# Patient Record
Sex: MALE | Race: WHITE | Employment: OTHER | URBAN - METROPOLITAN AREA
[De-identification: names, ages, dates, MRNs, and addresses within clinical notes are randomized per-mention and may not be internally consistent; named-entity substitution may affect disease eponyms.]

---

## 2017-04-05 PROBLEM — I48.0 PAF (PAROXYSMAL ATRIAL FIBRILLATION) (HCC): Status: ACTIVE | Noted: 2017-04-05

## 2017-04-05 PROBLEM — R94.39 ABNORMAL STRESS TEST: Status: ACTIVE | Noted: 2017-04-05

## 2019-06-02 ENCOUNTER — HOSPITAL ENCOUNTER (EMERGENCY)
Age: 64
Discharge: HOME | End: 2019-06-03
Attending: EMERGENCY MEDICINE
Payer: COMMERCIAL

## 2019-06-02 ENCOUNTER — APPOINTMENT (OUTPATIENT)
Dept: CT IMAGING | Age: 64
End: 2019-06-02
Payer: COMMERCIAL

## 2019-06-02 DIAGNOSIS — N20.0 KIDNEY STONE: ICD-10-CM

## 2019-06-02 DIAGNOSIS — M47.816 OSTEOARTHRITIS OF LUMBAR SPINE, UNSPECIFIED SPINAL OSTEOARTHRITIS COMPLICATION STATUS: ICD-10-CM

## 2019-06-02 DIAGNOSIS — S39.012A STRAIN OF LUMBAR REGION, INITIAL ENCOUNTER: Primary | ICD-10-CM

## 2019-06-02 LAB
ALBUMIN SERPL-MCNC: 4.5 G/DL (ref 3.5–5.2)
ALP BLD-CCNC: 88 U/L (ref 40–129)
ALT SERPL-CCNC: 25 U/L (ref 0–40)
ANION GAP SERPL CALCULATED.3IONS-SCNC: 17 MMOL/L (ref 7–16)
AST SERPL-CCNC: 23 U/L (ref 0–39)
BASOPHILS ABSOLUTE: 0.03 E9/L (ref 0–0.2)
BASOPHILS RELATIVE PERCENT: 0.4 % (ref 0–2)
BILIRUB SERPL-MCNC: 0.5 MG/DL (ref 0–1.2)
BILIRUBIN URINE: NEGATIVE
BLOOD, URINE: NEGATIVE
BUN BLDV-MCNC: 18 MG/DL (ref 8–23)
CALCIUM SERPL-MCNC: 10.1 MG/DL (ref 8.6–10.2)
CHLORIDE BLD-SCNC: 102 MMOL/L (ref 98–107)
CLARITY: CLEAR
CO2: 18 MMOL/L (ref 22–29)
COLOR: YELLOW
CREAT SERPL-MCNC: 0.9 MG/DL (ref 0.7–1.2)
EOSINOPHILS ABSOLUTE: 0 E9/L (ref 0.05–0.5)
EOSINOPHILS RELATIVE PERCENT: 0 % (ref 0–6)
GFR AFRICAN AMERICAN: >60
GFR NON-AFRICAN AMERICAN: >60 ML/MIN/1.73
GLUCOSE BLD-MCNC: 180 MG/DL (ref 74–99)
GLUCOSE URINE: NEGATIVE MG/DL
HCT VFR BLD CALC: 50.9 % (ref 37–54)
HEMOGLOBIN: 17.4 G/DL (ref 12.5–16.5)
IMMATURE GRANULOCYTES #: 0.02 E9/L
IMMATURE GRANULOCYTES %: 0.3 % (ref 0–5)
KETONES, URINE: NEGATIVE MG/DL
LEUKOCYTE ESTERASE, URINE: NEGATIVE
LIPASE: 19 U/L (ref 13–60)
LYMPHOCYTES ABSOLUTE: 0.66 E9/L (ref 1.5–4)
LYMPHOCYTES RELATIVE PERCENT: 8.8 % (ref 20–42)
MCH RBC QN AUTO: 29.4 PG (ref 26–35)
MCHC RBC AUTO-ENTMCNC: 34.2 % (ref 32–34.5)
MCV RBC AUTO: 86.1 FL (ref 80–99.9)
MONOCYTES ABSOLUTE: 0.12 E9/L (ref 0.1–0.95)
MONOCYTES RELATIVE PERCENT: 1.6 % (ref 2–12)
NEUTROPHILS ABSOLUTE: 6.64 E9/L (ref 1.8–7.3)
NEUTROPHILS RELATIVE PERCENT: 88.9 % (ref 43–80)
NITRITE, URINE: NEGATIVE
PDW BLD-RTO: 12.1 FL (ref 11.5–15)
PH UA: 5.5 (ref 5–9)
PLATELET # BLD: 280 E9/L (ref 130–450)
PMV BLD AUTO: 10.8 FL (ref 7–12)
POTASSIUM REFLEX MAGNESIUM: 4.1 MMOL/L (ref 3.5–5)
PROTEIN UA: NEGATIVE MG/DL
RBC # BLD: 5.91 E12/L (ref 3.8–5.8)
SODIUM BLD-SCNC: 137 MMOL/L (ref 132–146)
SPECIFIC GRAVITY UA: <=1.005 (ref 1–1.03)
TOTAL PROTEIN: 7.2 G/DL (ref 6.4–8.3)
UROBILINOGEN, URINE: 0.2 E.U./DL
WBC # BLD: 7.5 E9/L (ref 4.5–11.5)

## 2019-06-02 PROCEDURE — 96375 TX/PRO/DX INJ NEW DRUG ADDON: CPT

## 2019-06-02 PROCEDURE — 85025 COMPLETE CBC W/AUTO DIFF WBC: CPT

## 2019-06-02 PROCEDURE — 74177 CT ABD & PELVIS W/CONTRAST: CPT

## 2019-06-02 PROCEDURE — 80053 COMPREHEN METABOLIC PANEL: CPT

## 2019-06-02 PROCEDURE — 2580000003 HC RX 258: Performed by: RADIOLOGY

## 2019-06-02 PROCEDURE — 2580000003 HC RX 258: Performed by: EMERGENCY MEDICINE

## 2019-06-02 PROCEDURE — 83690 ASSAY OF LIPASE: CPT

## 2019-06-02 PROCEDURE — 99284 EMERGENCY DEPT VISIT MOD MDM: CPT

## 2019-06-02 PROCEDURE — 72131 CT LUMBAR SPINE W/O DYE: CPT

## 2019-06-02 PROCEDURE — 96374 THER/PROPH/DIAG INJ IV PUSH: CPT

## 2019-06-02 PROCEDURE — 6360000002 HC RX W HCPCS: Performed by: EMERGENCY MEDICINE

## 2019-06-02 PROCEDURE — 6360000004 HC RX CONTRAST MEDICATION: Performed by: RADIOLOGY

## 2019-06-02 PROCEDURE — 81003 URINALYSIS AUTO W/O SCOPE: CPT

## 2019-06-02 RX ORDER — SODIUM CHLORIDE 0.9 % (FLUSH) 0.9 %
10 SYRINGE (ML) INJECTION 2 TIMES DAILY
Status: DISCONTINUED | OUTPATIENT
Start: 2019-06-02 | End: 2019-06-03 | Stop reason: HOSPADM

## 2019-06-02 RX ORDER — ONDANSETRON 2 MG/ML
4 INJECTION INTRAMUSCULAR; INTRAVENOUS ONCE
Status: COMPLETED | OUTPATIENT
Start: 2019-06-02 | End: 2019-06-02

## 2019-06-02 RX ORDER — 0.9 % SODIUM CHLORIDE 0.9 %
1000 INTRAVENOUS SOLUTION INTRAVENOUS ONCE
Status: COMPLETED | OUTPATIENT
Start: 2019-06-02 | End: 2019-06-02

## 2019-06-02 RX ORDER — DEXAMETHASONE SODIUM PHOSPHATE 10 MG/ML
10 INJECTION INTRAMUSCULAR; INTRAVENOUS ONCE
Status: COMPLETED | OUTPATIENT
Start: 2019-06-02 | End: 2019-06-02

## 2019-06-02 RX ORDER — KETOROLAC TROMETHAMINE 30 MG/ML
30 INJECTION, SOLUTION INTRAMUSCULAR; INTRAVENOUS ONCE
Status: COMPLETED | OUTPATIENT
Start: 2019-06-02 | End: 2019-06-02

## 2019-06-02 RX ORDER — MAGNESIUM GLUCONATE 27 MG(500)
500 TABLET ORAL DAILY
COMMUNITY

## 2019-06-02 RX ORDER — ASPIRIN 81 MG/1
81 TABLET ORAL DAILY
COMMUNITY

## 2019-06-02 RX ADMIN — KETOROLAC TROMETHAMINE 30 MG: 30 INJECTION, SOLUTION INTRAMUSCULAR; INTRAVENOUS at 22:15

## 2019-06-02 RX ADMIN — HYDROMORPHONE HYDROCHLORIDE 0.5 MG: 1 INJECTION, SOLUTION INTRAMUSCULAR; INTRAVENOUS; SUBCUTANEOUS at 22:15

## 2019-06-02 RX ADMIN — IOPAMIDOL 110 ML: 755 INJECTION, SOLUTION INTRAVENOUS at 23:45

## 2019-06-02 RX ADMIN — SODIUM CHLORIDE 1000 ML: 9 INJECTION, SOLUTION INTRAVENOUS at 22:16

## 2019-06-02 RX ADMIN — DEXAMETHASONE SODIUM PHOSPHATE 10 MG: 10 INJECTION INTRAMUSCULAR; INTRAVENOUS at 22:15

## 2019-06-02 RX ADMIN — Medication 10 ML: at 23:40

## 2019-06-02 RX ADMIN — ONDANSETRON 4 MG: 2 INJECTION INTRAMUSCULAR; INTRAVENOUS at 22:15

## 2019-06-02 ASSESSMENT — ENCOUNTER SYMPTOMS
SORE THROAT: 0
ABDOMINAL PAIN: 1
COUGH: 0
BACK PAIN: 1
SHORTNESS OF BREATH: 0
EYES NEGATIVE: 1

## 2019-06-02 ASSESSMENT — PAIN SCALES - GENERAL
PAINLEVEL_OUTOF10: 8
PAINLEVEL_OUTOF10: 10

## 2019-06-02 ASSESSMENT — PAIN DESCRIPTION - PAIN TYPE: TYPE: ACUTE PAIN

## 2019-06-02 ASSESSMENT — PAIN DESCRIPTION - LOCATION: LOCATION: BACK

## 2019-06-03 VITALS
SYSTOLIC BLOOD PRESSURE: 149 MMHG | HEIGHT: 65 IN | HEART RATE: 80 BPM | RESPIRATION RATE: 18 BRPM | WEIGHT: 185 LBS | OXYGEN SATURATION: 96 % | BODY MASS INDEX: 30.82 KG/M2 | TEMPERATURE: 97 F | DIASTOLIC BLOOD PRESSURE: 82 MMHG

## 2019-06-03 PROCEDURE — 6370000000 HC RX 637 (ALT 250 FOR IP): Performed by: EMERGENCY MEDICINE

## 2019-06-03 RX ORDER — PREDNISONE 50 MG/1
50 TABLET ORAL DAILY
Qty: 5 TABLET | Refills: 0 | Status: SHIPPED | OUTPATIENT
Start: 2019-06-03 | End: 2019-06-08

## 2019-06-03 RX ORDER — OXYCODONE HYDROCHLORIDE AND ACETAMINOPHEN 5; 325 MG/1; MG/1
2 TABLET ORAL ONCE
Status: COMPLETED | OUTPATIENT
Start: 2019-06-03 | End: 2019-06-03

## 2019-06-03 RX ORDER — IBUPROFEN 600 MG/1
600 TABLET ORAL EVERY 8 HOURS PRN
Qty: 30 TABLET | Refills: 1 | Status: SHIPPED | OUTPATIENT
Start: 2019-06-03

## 2019-06-03 RX ORDER — OXYCODONE HYDROCHLORIDE AND ACETAMINOPHEN 5; 325 MG/1; MG/1
1 TABLET ORAL EVERY 6 HOURS PRN
Qty: 12 TABLET | Refills: 0 | Status: SHIPPED | OUTPATIENT
Start: 2019-06-03 | End: 2019-06-06

## 2019-06-03 RX ADMIN — OXYCODONE HYDROCHLORIDE AND ACETAMINOPHEN 2 TABLET: 5; 325 TABLET ORAL at 01:33

## 2019-06-03 ASSESSMENT — PAIN SCALES - GENERAL: PAINLEVEL_OUTOF10: 5

## 2019-06-03 NOTE — ED PROVIDER NOTES
Patient is a 58year old male with hx of chronic back pain presenting to the ED for worsening low back pain over the last 3 days. Pain radiates down his right leg. Pt notes that he was cutting the grass on a tractor before onset of pain. Pt also complains of lower abdominal pain. Pt denies any fall, injury, fever, chills, headache, weakness, numbness, chest pain, sob, nausea, emesis, diarrhea, or bowel/bladder dysfunction. The history is provided by the patient, a parent and a relative. No  was used. Back Pain   Pain location: lower. Radiates to: R leg. Pain severity:  Moderate  Onset quality:  Gradual  Duration:  3 days  Timing:  Constant  Progression:  Worsening  Chronicity:  Recurrent  Associated symptoms: abdominal pain    Associated symptoms: no chest pain, no dysuria, no fever, no headaches, no numbness and no weakness        Review of Systems   Constitutional: Negative for chills and fever. HENT: Negative for congestion and sore throat. Eyes: Negative. Respiratory: Negative for cough and shortness of breath. Cardiovascular: Negative for chest pain and leg swelling. Gastrointestinal: Positive for abdominal pain. Genitourinary: Negative for difficulty urinating, dysuria, flank pain, frequency, hematuria and urgency. Musculoskeletal: Positive for back pain. Right leg pain   Neurological: Negative for dizziness, syncope, weakness, light-headedness, numbness and headaches. All other systems reviewed and are negative. Physical Exam   Constitutional: He appears well-developed and well-nourished. He appears distressed (mild to moderate). HENT:   Head: Normocephalic and atraumatic. Eyes: Pupils are equal, round, and reactive to light. Conjunctivae and EOM are normal.   Neck: Normal range of motion. Cardiovascular: Normal rate, regular rhythm, normal heart sounds and intact distal pulses.    Pulmonary/Chest: Effort normal and breath sounds normal. history of Abnormal stress test, Cancer ([facility] [address] Chronic back pain, Hyperlipidemia, Hypertension, and PAF (paroxysmal atrial fibrillation) ([facility] [address] Past Surgical History:  has a past surgical history that includes Small intestine surgery; lymph node biopsy; Spine surgery; Colonoscopy; and Rotator cuff repair. Social History:  reports that he has been smoking cigars. He started smoking about 39 years ago. He has quit using smokeless tobacco. He reports that he drinks alcohol. He reports that he does not use drugs. Family History: family history includes Heart Disease in his sister; Stroke in his father. The patients home medications have been reviewed. Allergies: Patient has no known allergies.     -------------------------------------------------- RESULTS -------------------------------------------------  Labs:  Results for orders placed or performed during the hospital encounter of 06/02/19   CBC Auto Differential   Result Value Ref Range    WBC 7.5 4.5 - 11.5 E9/L    RBC 5.91 (H) 3.80 - 5.80 E12/L    Hemoglobin 17.4 (H) 12.5 - 16.5 g/dL    Hematocrit 50.9 37.0 - 54.0 %    MCV 86.1 80.0 - 99.9 fL    MCH 29.4 26.0 - 35.0 pg    MCHC 34.2 32.0 - 34.5 %    RDW 12.1 11.5 - 15.0 fL    Platelets 372 824 - 495 E9/L    MPV 10.8 7.0 - 12.0 fL    Neutrophils % 88.9 (H) 43.0 - 80.0 %    Immature Granulocytes % 0.3 0.0 - 5.0 %    Lymphocytes % 8.8 (L) 20.0 - 42.0 %    Monocytes % 1.6 (L) 2.0 - 12.0 %    Eosinophils % 0.0 0.0 - 6.0 %    Basophils % 0.4 0.0 - 2.0 %    Neutrophils # 6.64 1.80 - 7.30 E9/L    Immature Granulocytes # 0.02 E9/L    Lymphocytes # 0.66 (L) 1.50 - 4.00 E9/L    Monocytes # 0.12 0.10 - 0.95 E9/L    Eosinophils # 0.00 (L) 0.05 - 0.50 E9/L    Basophils # 0.03 0.00 - 0.20 E9/L   Comprehensive Metabolic Panel w/ Reflex to MG   Result Value Ref Range    Sodium 137 132 - 146 mmol/L    Potassium reflex Magnesium 4.1 3.5 - 5.0 mmol/L    Chloride 102 98 - 107 mmol/L    CO2 18 (L) 22 - 29 mmol/L    Anion Gap 17 (H) 7 - 16 mmol/L    Glucose 180 (H) 74 - 99 mg/dL    BUN 18 8 - 23 mg/dL    CREATININE 0.9 0.7 - 1.2 mg/dL    GFR Non-African American >60 >=60 mL/min/1.73    GFR African American >60     Calcium 10.1 8.6 - 10.2 mg/dL    Total Protein 7.2 6.4 - 8.3 g/dL    Alb 4.5 3.5 - 5.2 g/dL    Total Bilirubin 0.5 0.0 - 1.2 mg/dL    Alkaline Phosphatase 88 40 - 129 U/L    ALT 25 0 - 40 U/L    AST 23 0 - 39 U/L   Lipase   Result Value Ref Range    Lipase 19 13 - 60 U/L   Urinalysis, reflex to microscopic   Result Value Ref Range    Color, UA Yellow Straw/Yellow    Clarity, UA Clear Clear    Glucose, Ur Negative Negative mg/dL    Bilirubin Urine Negative Negative    Ketones, Urine Negative Negative mg/dL    Specific Gravity, UA <=1.005 1.005 - 1.030    Blood, Urine Negative Negative    pH, UA 5.5 5.0 - 9.0    Protein, UA Negative Negative mg/dL    Urobilinogen, Urine 0.2 <2.0 E.U./dL    Nitrite, Urine Negative Negative    Leukocyte Esterase, Urine Negative Negative       Radiology:  CT ABDOMEN PELVIS W IV CONTRAST Additional Contrast? None   Final Result   1. Nonobstructing calcifications in the renal pelvis bilaterally. Large left    calcified stone present although no obstruction present. Calcification in the    left renal pelvis measures approximately one centimeters. Right renal cyst   2. Thickening of the wall of the urinary bladder, could be partially due to    incomplete distention although underlying inflammatory changes are in the    differential consideration. 3. Diverticulosis of the colon present. This report has been electronically signed by Juan Matos MD.      CT Lumbar Spine WO Contrast   Final Result   Degenerative spondylosis as above if clinically warranted consider    followup MRI to evaluate the soft tissues in the differential.      Hypodensity in the right kidney may represent cysts, incompletely characterized.       This report has been electronically signed by Juan Matos MD. ------------------------- NURSING NOTES AND VITALS REVIEWED ---------------------------  Date / Time Roomed:  6/2/2019  9:27 PM  ED Bed Assignment:  04/04    The nursing notes within the ED encounter and vital signs as below have been reviewed. BP (!) 182/84   Pulse 82   Temp 97 °F (36.1 °C) (Axillary)   Resp 20   Ht 5' 5\" (1.651 m)   Wt 185 lb (83.9 kg)   SpO2 94%   BMI 30.79 kg/m²   Oxygen Saturation Interpretation: Normal      ------------------------------------------ PROGRESS NOTES ------------------------------------------  I have spoken with the patient and discussed todays results, in addition to providing specific details for the plan of care and counseling regarding the diagnosis and prognosis. Their questions are answered at this time and they are agreeable with the plan. I discussed at length with them reasons for immediate return here for re evaluation. They will followup with primary care by calling their office tomorrow. --------------------------------- ADDITIONAL PROVIDER NOTES ---------------------------------  At this time the patient is without objective evidence of an acute process requiring hospitalization or inpatient management. They have remained hemodynamically stable throughout their entire ED visit and are stable for discharge with outpatient follow-up. The plan has been discussed in detail and they are aware of the specific conditions for emergent return, as well as the importance of follow-up. New Prescriptions    IBUPROFEN (IBU) 600 MG TABLET    Take 1 tablet by mouth every 8 hours as needed for Pain Take with food. OXYCODONE-ACETAMINOPHEN (PERCOCET) 5-325 MG PER TABLET    Take 1 tablet by mouth every 6 hours as needed for Pain for up to 3 days. Intended supply: 3 days. Take lowest dose possible to manage pain    PREDNISONE (DELTASONE) 50 MG TABLET    Take 1 tablet by mouth daily for 5 days       Diagnosis:  1.  Strain of lumbar region, initial encounter New Problem   2. Osteoarthritis of lumbar spine, unspecified spinal osteoarthritis complication status New Problem   3. Kidney stone New Problem       Disposition:  Patient's disposition: Discharge to home  Patient's condition is stable. 6/2/19, 9:49 PM.    This note is prepared by Randall Turner, acting as Scribe for The DO Milli. The DO Milli:  The scribe's documentation has been prepared under my direction and personally reviewed by me in its entirety. I confirm that the note above accurately reflects all work, treatment, procedures, and medical decision making performed by me.         The Milli [place]  06/03/19 1415

## 2020-02-21 ENCOUNTER — HOSPITAL ENCOUNTER (OUTPATIENT)
Age: 65
Setting detail: OBSERVATION
Discharge: HOME | End: 2020-02-22
Attending: EMERGENCY MEDICINE | Admitting: INTERNAL MEDICINE
Payer: MEDICARE

## 2020-02-21 PROCEDURE — 99285 EMERGENCY DEPT VISIT HI MDM: CPT

## 2020-02-21 PROCEDURE — 93005 ELECTROCARDIOGRAM TRACING: CPT | Performed by: EMERGENCY MEDICINE

## 2020-02-22 ENCOUNTER — APPOINTMENT (OUTPATIENT)
Dept: GENERAL RADIOLOGY | Age: 65
End: 2020-02-22
Payer: MEDICARE

## 2020-02-22 ENCOUNTER — APPOINTMENT (OUTPATIENT)
Dept: NUCLEAR MEDICINE | Age: 65
End: 2020-02-22
Payer: MEDICARE

## 2020-02-22 VITALS
BODY MASS INDEX: 30.32 KG/M2 | TEMPERATURE: 97.9 F | SYSTOLIC BLOOD PRESSURE: 142 MMHG | OXYGEN SATURATION: 94 % | HEART RATE: 77 BPM | WEIGHT: 182 LBS | DIASTOLIC BLOOD PRESSURE: 89 MMHG | RESPIRATION RATE: 18 BRPM | HEIGHT: 65 IN

## 2020-02-22 PROBLEM — I48.0 PAROXYSMAL ATRIAL FIBRILLATION (HCC): Chronic | Status: ACTIVE | Noted: 2020-02-22

## 2020-02-22 PROBLEM — E78.5 HYPERLIPIDEMIA LDL GOAL <100: Chronic | Status: ACTIVE | Noted: 2020-02-22

## 2020-02-22 PROBLEM — E78.5 DYSLIPIDEMIA: Status: RESOLVED | Noted: 2020-02-22 | Resolved: 2020-02-22

## 2020-02-22 PROBLEM — I48.0 PAF (PAROXYSMAL ATRIAL FIBRILLATION) (HCC): Status: RESOLVED | Noted: 2017-04-05 | Resolved: 2020-02-22

## 2020-02-22 PROBLEM — E78.5 DYSLIPIDEMIA: Status: ACTIVE | Noted: 2020-02-22

## 2020-02-22 PROBLEM — I10 ESSENTIAL HYPERTENSION: Status: ACTIVE | Noted: 2020-02-22

## 2020-02-22 PROBLEM — R07.9 CHEST PAIN: Status: ACTIVE | Noted: 2020-02-22

## 2020-02-22 PROBLEM — R94.39 ABNORMAL STRESS TEST: Status: RESOLVED | Noted: 2017-04-05 | Resolved: 2020-02-22

## 2020-02-22 LAB
ALBUMIN SERPL-MCNC: 4.3 G/DL (ref 3.5–5.2)
ALP BLD-CCNC: 91 U/L (ref 40–129)
ALT SERPL-CCNC: 34 U/L (ref 0–40)
ANION GAP SERPL CALCULATED.3IONS-SCNC: 11 MMOL/L (ref 7–16)
ANION GAP SERPL CALCULATED.3IONS-SCNC: 13 MMOL/L (ref 7–16)
APTT: 30.9 SEC (ref 24.5–35.1)
AST SERPL-CCNC: 26 U/L (ref 0–39)
BASOPHILS ABSOLUTE: 0.08 E9/L (ref 0–0.2)
BASOPHILS ABSOLUTE: 0.09 E9/L (ref 0–0.2)
BASOPHILS RELATIVE PERCENT: 1.2 % (ref 0–2)
BASOPHILS RELATIVE PERCENT: 1.3 % (ref 0–2)
BILIRUB SERPL-MCNC: 0.6 MG/DL (ref 0–1.2)
BUN BLDV-MCNC: 13 MG/DL (ref 8–23)
BUN BLDV-MCNC: 15 MG/DL (ref 8–23)
CALCIUM SERPL-MCNC: 9.2 MG/DL (ref 8.6–10.2)
CALCIUM SERPL-MCNC: 9.4 MG/DL (ref 8.6–10.2)
CHLORIDE BLD-SCNC: 104 MMOL/L (ref 98–107)
CHLORIDE BLD-SCNC: 99 MMOL/L (ref 98–107)
CHOLESTEROL, TOTAL: 163 MG/DL (ref 0–199)
CO2: 22 MMOL/L (ref 22–29)
CO2: 22 MMOL/L (ref 22–29)
CREAT SERPL-MCNC: 0.7 MG/DL (ref 0.7–1.2)
CREAT SERPL-MCNC: 0.9 MG/DL (ref 0.7–1.2)
EKG ATRIAL RATE: 85 BPM
EKG P AXIS: 29 DEGREES
EKG P-R INTERVAL: 172 MS
EKG Q-T INTERVAL: 400 MS
EKG QRS DURATION: 86 MS
EKG QTC CALCULATION (BAZETT): 476 MS
EKG R AXIS: -11 DEGREES
EKG T AXIS: 37 DEGREES
EKG VENTRICULAR RATE: 85 BPM
EOSINOPHILS ABSOLUTE: 0.27 E9/L (ref 0.05–0.5)
EOSINOPHILS ABSOLUTE: 0.29 E9/L (ref 0.05–0.5)
EOSINOPHILS RELATIVE PERCENT: 3.5 % (ref 0–6)
EOSINOPHILS RELATIVE PERCENT: 4.7 % (ref 0–6)
GFR AFRICAN AMERICAN: >60
GFR AFRICAN AMERICAN: >60
GFR NON-AFRICAN AMERICAN: >60 ML/MIN/1.73
GFR NON-AFRICAN AMERICAN: >60 ML/MIN/1.73
GLUCOSE BLD-MCNC: 103 MG/DL (ref 74–99)
GLUCOSE BLD-MCNC: 110 MG/DL (ref 74–99)
HCT VFR BLD CALC: 44.8 % (ref 37–54)
HCT VFR BLD CALC: 46 % (ref 37–54)
HDLC SERPL-MCNC: 41 MG/DL
HEMOGLOBIN: 15 G/DL (ref 12.5–16.5)
HEMOGLOBIN: 15.6 G/DL (ref 12.5–16.5)
IMMATURE GRANULOCYTES #: 0.01 E9/L
IMMATURE GRANULOCYTES #: 0.02 E9/L
IMMATURE GRANULOCYTES %: 0.2 % (ref 0–5)
IMMATURE GRANULOCYTES %: 0.3 % (ref 0–5)
INR BLD: 0.9
LDL CHOLESTEROL CALCULATED: ABNORMAL MG/DL (ref 0–99)
LV EF: 70 %
LVEF MODALITY: NORMAL
LYMPHOCYTES ABSOLUTE: 1.81 E9/L (ref 1.5–4)
LYMPHOCYTES ABSOLUTE: 1.92 E9/L (ref 1.5–4)
LYMPHOCYTES RELATIVE PERCENT: 24.9 % (ref 20–42)
LYMPHOCYTES RELATIVE PERCENT: 29.4 % (ref 20–42)
MCH RBC QN AUTO: 29.6 PG (ref 26–35)
MCH RBC QN AUTO: 29.6 PG (ref 26–35)
MCHC RBC AUTO-ENTMCNC: 33.5 % (ref 32–34.5)
MCHC RBC AUTO-ENTMCNC: 33.9 % (ref 32–34.5)
MCV RBC AUTO: 87.3 FL (ref 80–99.9)
MCV RBC AUTO: 88.4 FL (ref 80–99.9)
MONOCYTES ABSOLUTE: 0.72 E9/L (ref 0.1–0.95)
MONOCYTES ABSOLUTE: 0.99 E9/L (ref 0.1–0.95)
MONOCYTES RELATIVE PERCENT: 11.7 % (ref 2–12)
MONOCYTES RELATIVE PERCENT: 12.8 % (ref 2–12)
NEUTROPHILS ABSOLUTE: 3.24 E9/L (ref 1.8–7.3)
NEUTROPHILS ABSOLUTE: 4.43 E9/L (ref 1.8–7.3)
NEUTROPHILS RELATIVE PERCENT: 52.7 % (ref 43–80)
NEUTROPHILS RELATIVE PERCENT: 57.3 % (ref 43–80)
PDW BLD-RTO: 12.4 FL (ref 11.5–15)
PDW BLD-RTO: 12.5 FL (ref 11.5–15)
PLATELET # BLD: 222 E9/L (ref 130–450)
PLATELET # BLD: 249 E9/L (ref 130–450)
PMV BLD AUTO: 10.2 FL (ref 7–12)
PMV BLD AUTO: 10.4 FL (ref 7–12)
POTASSIUM REFLEX MAGNESIUM: 3.8 MMOL/L (ref 3.5–5)
POTASSIUM REFLEX MAGNESIUM: 4.3 MMOL/L (ref 3.5–5)
PROTHROMBIN TIME: 11.2 SEC (ref 9.3–12.4)
RBC # BLD: 5.07 E12/L (ref 3.8–5.8)
RBC # BLD: 5.27 E12/L (ref 3.8–5.8)
SODIUM BLD-SCNC: 134 MMOL/L (ref 132–146)
SODIUM BLD-SCNC: 137 MMOL/L (ref 132–146)
TOTAL PROTEIN: 6.8 G/DL (ref 6.4–8.3)
TRIGL SERPL-MCNC: 500 MG/DL (ref 0–149)
TROPONIN: <0.01 NG/ML (ref 0–0.03)
VLDLC SERPL CALC-MCNC: ABNORMAL MG/DL
WBC # BLD: 6.2 E9/L (ref 4.5–11.5)
WBC # BLD: 7.7 E9/L (ref 4.5–11.5)

## 2020-02-22 PROCEDURE — 85610 PROTHROMBIN TIME: CPT

## 2020-02-22 PROCEDURE — 93010 ELECTROCARDIOGRAM REPORT: CPT | Performed by: INTERNAL MEDICINE

## 2020-02-22 PROCEDURE — 6360000002 HC RX W HCPCS: Performed by: INTERNAL MEDICINE

## 2020-02-22 PROCEDURE — 6360000002 HC RX W HCPCS: Performed by: NURSE PRACTITIONER

## 2020-02-22 PROCEDURE — 96372 THER/PROPH/DIAG INJ SC/IM: CPT

## 2020-02-22 PROCEDURE — 93018 CV STRESS TEST I&R ONLY: CPT | Performed by: INTERNAL MEDICINE

## 2020-02-22 PROCEDURE — 85730 THROMBOPLASTIN TIME PARTIAL: CPT

## 2020-02-22 PROCEDURE — G0378 HOSPITAL OBSERVATION PER HR: HCPCS

## 2020-02-22 PROCEDURE — 80061 LIPID PANEL: CPT

## 2020-02-22 PROCEDURE — 80048 BASIC METABOLIC PNL TOTAL CA: CPT

## 2020-02-22 PROCEDURE — 93017 CV STRESS TEST TRACING ONLY: CPT

## 2020-02-22 PROCEDURE — 93016 CV STRESS TEST SUPVJ ONLY: CPT | Performed by: INTERNAL MEDICINE

## 2020-02-22 PROCEDURE — 6370000000 HC RX 637 (ALT 250 FOR IP): Performed by: NURSE PRACTITIONER

## 2020-02-22 PROCEDURE — A9500 TC99M SESTAMIBI: HCPCS | Performed by: RADIOLOGY

## 2020-02-22 PROCEDURE — 6370000000 HC RX 637 (ALT 250 FOR IP): Performed by: STUDENT IN AN ORGANIZED HEALTH CARE EDUCATION/TRAINING PROGRAM

## 2020-02-22 PROCEDURE — 71045 X-RAY EXAM CHEST 1 VIEW: CPT

## 2020-02-22 PROCEDURE — APPSS45 APP SPLIT SHARED TIME 31-45 MINUTES: Performed by: PHYSICIAN ASSISTANT

## 2020-02-22 PROCEDURE — 78452 HT MUSCLE IMAGE SPECT MULT: CPT

## 2020-02-22 PROCEDURE — 93005 ELECTROCARDIOGRAM TRACING: CPT | Performed by: STUDENT IN AN ORGANIZED HEALTH CARE EDUCATION/TRAINING PROGRAM

## 2020-02-22 PROCEDURE — 3430000000 HC RX DIAGNOSTIC RADIOPHARMACEUTICAL: Performed by: RADIOLOGY

## 2020-02-22 PROCEDURE — 84484 ASSAY OF TROPONIN QUANT: CPT

## 2020-02-22 PROCEDURE — 80053 COMPREHEN METABOLIC PANEL: CPT

## 2020-02-22 PROCEDURE — 36415 COLL VENOUS BLD VENIPUNCTURE: CPT

## 2020-02-22 PROCEDURE — 2580000003 HC RX 258: Performed by: NURSE PRACTITIONER

## 2020-02-22 PROCEDURE — 99214 OFFICE O/P EST MOD 30 MIN: CPT | Performed by: INTERNAL MEDICINE

## 2020-02-22 PROCEDURE — 85025 COMPLETE CBC W/AUTO DIFF WBC: CPT

## 2020-02-22 RX ORDER — OMEGA-3-ACID ETHYL ESTERS 1 G/1
2 CAPSULE, LIQUID FILLED ORAL 2 TIMES DAILY
Status: DISCONTINUED | OUTPATIENT
Start: 2020-02-22 | End: 2020-02-22 | Stop reason: HOSPADM

## 2020-02-22 RX ORDER — ASPIRIN 81 MG/1
324 TABLET, CHEWABLE ORAL ONCE
Status: COMPLETED | OUTPATIENT
Start: 2020-02-22 | End: 2020-02-22

## 2020-02-22 RX ORDER — NITROGLYCERIN 0.4 MG/1
0.4 TABLET SUBLINGUAL EVERY 5 MIN PRN
Status: DISCONTINUED | OUTPATIENT
Start: 2020-02-22 | End: 2020-02-22 | Stop reason: HOSPADM

## 2020-02-22 RX ORDER — ICOSAPENT ETHYL 1000 MG/1
2 CAPSULE ORAL 2 TIMES DAILY
Status: DISCONTINUED | OUTPATIENT
Start: 2020-02-22 | End: 2020-02-22 | Stop reason: CLARIF

## 2020-02-22 RX ORDER — ACETAMINOPHEN 650 MG/1
650 SUPPOSITORY RECTAL EVERY 6 HOURS PRN
Status: DISCONTINUED | OUTPATIENT
Start: 2020-02-22 | End: 2020-02-22 | Stop reason: HOSPADM

## 2020-02-22 RX ORDER — M-VIT,TX,IRON,MINS/CALC/FOLIC 27MG-0.4MG
1 TABLET ORAL DAILY
Status: DISCONTINUED | OUTPATIENT
Start: 2020-02-22 | End: 2020-02-22 | Stop reason: HOSPADM

## 2020-02-22 RX ORDER — ACETAMINOPHEN 325 MG/1
650 TABLET ORAL EVERY 6 HOURS PRN
Status: DISCONTINUED | OUTPATIENT
Start: 2020-02-22 | End: 2020-02-22 | Stop reason: HOSPADM

## 2020-02-22 RX ORDER — FENOFIBRATE 160 MG/1
160 TABLET ORAL DAILY
Status: DISCONTINUED | OUTPATIENT
Start: 2020-02-22 | End: 2020-02-22 | Stop reason: HOSPADM

## 2020-02-22 RX ORDER — ASPIRIN 81 MG/1
81 TABLET ORAL DAILY
Status: DISCONTINUED | OUTPATIENT
Start: 2020-02-22 | End: 2020-02-22 | Stop reason: HOSPADM

## 2020-02-22 RX ORDER — ATORVASTATIN CALCIUM 40 MG/1
20 TABLET, FILM COATED ORAL NIGHTLY
Status: DISCONTINUED | OUTPATIENT
Start: 2020-02-22 | End: 2020-02-22 | Stop reason: HOSPADM

## 2020-02-22 RX ORDER — HYDROCHLOROTHIAZIDE 12.5 MG/1
12.5 TABLET ORAL DAILY
Status: DISCONTINUED | OUTPATIENT
Start: 2020-02-22 | End: 2020-02-22 | Stop reason: HOSPADM

## 2020-02-22 RX ORDER — GEMFIBROZIL 600 MG/1
600 TABLET, FILM COATED ORAL 2 TIMES DAILY
Status: DISCONTINUED | OUTPATIENT
Start: 2020-02-22 | End: 2020-02-22 | Stop reason: HOSPADM

## 2020-02-22 RX ORDER — LISINOPRIL AND HYDROCHLOROTHIAZIDE 12.5; 1 MG/1; MG/1
1 TABLET ORAL EVERY OTHER DAY
Status: DISCONTINUED | OUTPATIENT
Start: 2020-02-22 | End: 2020-02-22 | Stop reason: CLARIF

## 2020-02-22 RX ORDER — ONDANSETRON 2 MG/ML
4 INJECTION INTRAMUSCULAR; INTRAVENOUS EVERY 6 HOURS PRN
Status: DISCONTINUED | OUTPATIENT
Start: 2020-02-22 | End: 2020-02-22 | Stop reason: HOSPADM

## 2020-02-22 RX ORDER — SODIUM CHLORIDE 0.9 % (FLUSH) 0.9 %
10 SYRINGE (ML) INJECTION EVERY 12 HOURS SCHEDULED
Status: DISCONTINUED | OUTPATIENT
Start: 2020-02-22 | End: 2020-02-22 | Stop reason: HOSPADM

## 2020-02-22 RX ORDER — MAGNESIUM GLUCONATE 27 MG(500)
500 TABLET ORAL 2 TIMES DAILY
Status: DISCONTINUED | OUTPATIENT
Start: 2020-02-22 | End: 2020-02-22 | Stop reason: HOSPADM

## 2020-02-22 RX ORDER — POLYETHYLENE GLYCOL 3350 17 G/17G
17 POWDER, FOR SOLUTION ORAL DAILY PRN
Status: DISCONTINUED | OUTPATIENT
Start: 2020-02-22 | End: 2020-02-22 | Stop reason: HOSPADM

## 2020-02-22 RX ORDER — PROMETHAZINE HYDROCHLORIDE 25 MG/1
12.5 TABLET ORAL EVERY 6 HOURS PRN
Status: DISCONTINUED | OUTPATIENT
Start: 2020-02-22 | End: 2020-02-22 | Stop reason: HOSPADM

## 2020-02-22 RX ORDER — SODIUM CHLORIDE 0.9 % (FLUSH) 0.9 %
10 SYRINGE (ML) INJECTION PRN
Status: DISCONTINUED | OUTPATIENT
Start: 2020-02-22 | End: 2020-02-22 | Stop reason: HOSPADM

## 2020-02-22 RX ORDER — CHOLECALCIFEROL (VITAMIN D3) 50 MCG
4000 TABLET ORAL DAILY
Status: DISCONTINUED | OUTPATIENT
Start: 2020-02-22 | End: 2020-02-22 | Stop reason: HOSPADM

## 2020-02-22 RX ORDER — LISINOPRIL 10 MG/1
10 TABLET ORAL DAILY
Status: DISCONTINUED | OUTPATIENT
Start: 2020-02-22 | End: 2020-02-22 | Stop reason: HOSPADM

## 2020-02-22 RX ADMIN — ASPIRIN 81 MG: 81 TABLET, COATED ORAL at 09:07

## 2020-02-22 RX ADMIN — OMEGA-3-ACID ETHYL ESTERS 2 G: 1 CAPSULE, LIQUID FILLED ORAL at 09:07

## 2020-02-22 RX ADMIN — OMEGA-3-ACID ETHYL ESTERS 2 G: 1 CAPSULE, LIQUID FILLED ORAL at 03:40

## 2020-02-22 RX ADMIN — NITROGLYCERIN 0.4 MG: 0.4 TABLET, ORALLY DISINTEGRATING SUBLINGUAL at 00:27

## 2020-02-22 RX ADMIN — FENOFIBRATE 160 MG: 160 TABLET ORAL at 09:07

## 2020-02-22 RX ADMIN — Medication 10 ML: at 09:07

## 2020-02-22 RX ADMIN — ATORVASTATIN CALCIUM 20 MG: 40 TABLET, FILM COATED ORAL at 03:39

## 2020-02-22 RX ADMIN — REGADENOSON 0.4 MG: 0.08 INJECTION, SOLUTION INTRAVENOUS at 11:22

## 2020-02-22 RX ADMIN — ENOXAPARIN SODIUM 40 MG: 40 INJECTION SUBCUTANEOUS at 09:06

## 2020-02-22 RX ADMIN — ASPIRIN 81 MG 324 MG: 81 TABLET ORAL at 00:26

## 2020-02-22 RX ADMIN — Medication 500 MG: at 09:07

## 2020-02-22 RX ADMIN — Medication 30 MILLICURIE: at 10:37

## 2020-02-22 RX ADMIN — Medication 1 TABLET: at 09:07

## 2020-02-22 RX ADMIN — Medication 10 MILLICURIE: at 10:37

## 2020-02-22 RX ADMIN — GEMFIBROZIL 600 MG: 600 TABLET ORAL at 09:07

## 2020-02-22 RX ADMIN — GEMFIBROZIL 600 MG: 600 TABLET ORAL at 03:40

## 2020-02-22 RX ADMIN — Medication 500 MG: at 03:40

## 2020-02-22 RX ADMIN — CHOLECALCIFEROL TAB 50 MCG (2000 UNIT) 4000 UNITS: 50 TAB at 09:07

## 2020-02-22 RX ADMIN — NITROGLYCERIN 0.4 MG: 0.4 TABLET, ORALLY DISINTEGRATING SUBLINGUAL at 00:33

## 2020-02-22 ASSESSMENT — ENCOUNTER SYMPTOMS
NAUSEA: 0
CONSTIPATION: 0
VOMITING: 0
COLOR CHANGE: 0
SHORTNESS OF BREATH: 1
BACK PAIN: 0
SORE THROAT: 0
DIARRHEA: 0
ABDOMINAL PAIN: 0
COUGH: 0

## 2020-02-22 ASSESSMENT — PAIN SCALES - GENERAL: PAINLEVEL_OUTOF10: 0

## 2020-02-22 NOTE — PLAN OF CARE
Problem: Safety:  Goal: Free from accidental physical injury  Description  Free from accidental physical injury  Outcome: Met This Shift  Goal: Free from intentional harm  Description  Free from intentional harm  Outcome: Met This Shift     Problem: Daily Care:  Goal: Daily care needs are met  Description  Daily care needs are met  Outcome: Met This Shift     Problem: Discharge Planning:  Goal: Patients continuum of care needs are met  Description  Patients continuum of care needs are met  Outcome: Met This Shift

## 2020-02-22 NOTE — H&P
[address] [facility]  Attending History and Physical      CHIEF COMPLAINT:  Chest pain      HISTORY OF PRESENT ILLNESS:      The patient is a 71 y.o. male patient of dr Kyle Kyle who presents with complains of chest pain. The day of presentation the patient developed pain in the middle of his chest.  He had numbness to his jaw and arm. He did not take anything for the symptoms. He was driving at the time. He denied shortness of breath, abdominal pain, nausea, vomiting, fevers, chills and diaphoresis. He did not take anything for the symptoms. He presented to the ED. He is symptom free at this time. Past Medical History:    Past Medical History:   Diagnosis Date    Abnormal stress test 4/5/2017    Cancer [facility])     Testicular    Chronic back pain     Hyperlipidemia     Hypertension     [facility] 4/5/2017       Past Surgical History:    Past Surgical History:   Procedure Laterality Date    COLONOSCOPY      LYMPH NODE BIOPSY      ROTATOR CUFF REPAIR      SMALL INTESTINE SURGERY      SPINE SURGERY         Medications Prior to Admission:    Medications Prior to Admission: ibuprofen (IBU) 600 MG tablet, Take 1 tablet by mouth every 8 hours as needed for Pain Take with food.   aspirin EC 81 MG EC tablet, Take 81 mg by mouth daily  magnesium gluconate (MAGONATE) 500 MG tablet, Take 500 mg by mouth 2 times daily  psyllium (KONSYL) 28.3 % PACK, Take 1 packet by mouth daily  atorvastatin (LIPITOR) 20 MG tablet,   gemfibrozil (LOPID) 600 MG tablet, TAKE 1 TABLET BY MOUTH TWICE A DAY  VASCEPA 1 G CAPS capsule, TAKE TWO CAPSULES TWO TIMES A DAY  vitamin D (CHOLECALCIFEROL) 1000 UNIT TABS tablet, Take 4,000 Units by mouth daily   Multiple Vitamins-Minerals (THERAPEUTIC MULTIVITAMIN-MINERALS) tablet, Take 1 tablet by mouth daily  lisinopril-hydrochlorothiazide (PRINZIDE;ZESTORETIC) 10-12.5 MG per tablet, Take 1 tablet by mouth every other day fenofibrate (TRICOR) 145 MG tablet, Take 145 mg by mouth daily. Allergies:    Patient has no known allergies. Social History:    reports that he has been smoking cigars. He started smoking about 40 years ago. He has quit using smokeless tobacco. He reports current alcohol use. He reports that he does not use drugs. Family History:   family history includes Heart Disease in his sister; Stroke in his father. REVIEW OF SYSTEMS:  As above in the HPI, otherwise negative    PHYSICAL EXAM:    Vitals:  BP (!) 142/89   Pulse 77   Temp 97.9 °F (36.6 °C) (Oral)   Resp 18   Ht 5' 5\" (1.651 m)   Wt 182 lb (82.6 kg)   SpO2 94%   BMI 30.29 kg/m²     General:  Awake, alert, oriented X 3. Well developed, well nourished, well groomed. No apparent distress. HEENT:  Normocephalic, atraumatic. Pupils equal, round, reactive to light. No scleral icterus. No conjunctival injection. Normal lips, teeth, and gums. No nasal discharge. Neck:  Supple  Heart:  RRR, no murmurs, gallops, rubs  Lungs:  CTA bilaterally, bilat symmetrical expansion, no wheeze, rales, or rhonchi  Abdomen:   Bowel sounds present, soft, nontender, no masses, no organomegaly, no peritoneal signs  Extremities:  No clubbing, cyanosis, or edema  Skin:  Warm and dry, no open lesions or rash  Neuro:  Cranial nerves 2-12 intact, no focal deficits  Breast: deferred  Rectal: deferred  Genitalia:  deferred    LABS:    CBC with Differential:    Lab Results   Component Value Date    WBC 6.2 02/22/2020    RBC 5.07 02/22/2020    HGB 15.0 02/22/2020    HCT 44.8 02/22/2020     02/22/2020    MCV 88.4 02/22/2020    MCH 29.6 02/22/2020    MCHC 33.5 02/22/2020    RDW 12.5 02/22/2020    LYMPHOPCT 29.4 02/22/2020    MONOPCT 11.7 02/22/2020    BASOPCT 1.3 02/22/2020    MONOSABS 0.72 02/22/2020    LYMPHSABS 1.81 02/22/2020    EOSABS 0.29 02/22/2020    BASOSABS 0.08 02/22/2020     CMP:    Lab Results   Component Value Date     02/22/2020    K 3.8

## 2020-02-22 NOTE — PROCEDURES
[facility] Nuclear Stress Test:    Cardiologist: Dr. Kyara Escoto EKG: NSR, inferior MI age undetermined, abnormal EKG. Indications for study: Atypical chest pain    1. No chest pain  2. No new arrhythmias  3. No EKG changes suggestive of stress induced ischemia  4. Nuclear images pending    Chayito Skinner MD., [facility] [place].    [facility]) Cardiology

## 2020-02-22 NOTE — ED NOTES
Pt states he is CP free after 2 SL nitro. C/o headache and \"weird\" feeling in nose.      Avila James RN  02/22/20 1029

## 2020-02-22 NOTE — CONSULTS
Inpatient Cardiology Consultation      Reason for Consult:  Chest pain    Consulting Physician: Dr. Martha Orozco    Requesting Physician:  Dr. Keila Murphy    Date of Consultation: 2/22/2020    HISTORY OF PRESENT ILLNESS:    This is a very pleasant 71year old gentleman who is previously known to Dr. Campo Child with a known medical history of HTN, hyperlipidemia, chronic back pain, diverticulosis, remote testicular CA (1985), lone episode of Afib (during colonoscopy in 3/17), and nuclear stress test in 2017 (Dr. Singleton [place]. Moderate to large fixed inferior defect with small to moderate reversible proximal lateral defect. EF 69%. Normal wall motion), and alcohol use (2 beers and 2 shots per day reported). He is normally able to do ADLs and ambulate up stairs without any exertional chest pain or significant CHILEL. Yesterday at 11 pm, while driving in a car, he noted left sided chest dull ache rated 2/10 on 1-10 discomfort scale associated with left arm tingling/numbness and left jaw numbness. He denied any SOB, palpitations, or sweats. His symptoms were persistent for 30 minutes. Upon arrival to ED, /109, EKG noted NSR with prior inf infarct pattern and PVCs, CXR negative for acute disease, K 4.3, BUN 15, creatinine 0.9, troponin <0.01 x 2 sets, WBC 7.7, H/H 15.6/46.0. Patient was administered  mg po x 1, NTG SL x 2. He has been chest pain free since arrival. He still notes mild left jaw numbness. Please note: past medical records were reviewed per electronic medical record (EMR) - see detailed reports under Past Medical/ Surgical History.    Past Medical History:    Past Medical History:   Diagnosis Date    Abnormal stress test 4/5/2017    Cancer [facility])     Testicular    Chronic back pain     Hyperlipidemia     Hypertension     PAF (paroxysmal atrial fibrillation) ([facility] [address] 4/5/2017     · HTN  · Hyperlipidemia  · chronic back pain  · Diverticulosis  · remote testicular CA (1985)  · lone episode of Afib (during colonoscopy in 3/17, placed on ASA 81 mg po daily)  · Lexiscan nuclear stress test in 2017 (Dr. Emeterio Carvajal. Moderate to large fixed inferior defect with small to moderate reversible proximal lateral defect. EF 69%. Normal wall motion)  · Echo 4/17: EF 65%, stage I DD, mild MR, mild TR, RVSP 23  · alcohol use (2 beers and 2 shots per day reported)    Past Surgical History:    Past Surgical History:   Procedure Laterality Date    COLONOSCOPY      LYMPH NODE BIOPSY      ROTATOR CUFF REPAIR      SMALL INTESTINE SURGERY      SPINE SURGERY         Medications Prior to admit:  Prior to Admission medications    Medication Sig Start Date End Date Taking? Authorizing Provider   ibuprofen (IBU) 600 MG tablet Take 1 tablet by mouth every 8 hours as needed for Pain Take with food. 6/3/19  Yes Fabiola Fabian DO   aspirin EC 81 MG EC tablet Take 81 mg by mouth daily   Yes Historical Provider, MD   magnesium gluconate (MAGONATE) 500 MG tablet Take 500 mg by mouth 2 times daily   Yes Historical Provider, MD   psyllium (KONSYL) 28.3 % PACK Take 1 packet by mouth daily   Yes Historical Provider, MD   atorvastatin (LIPITOR) 20 MG tablet  3/27/17  Yes Historical Provider, MD   gemfibrozil (LOPID) 600 MG tablet TAKE 1 TABLET BY MOUTH TWICE A DAY 3/1/17  Yes Historical Provider, MD   VASCEPA 1 G CAPS capsule TAKE TWO CAPSULES TWO TIMES A DAY 2/20/17  Yes Historical Provider, MD   vitamin D (CHOLECALCIFEROL) 1000 UNIT TABS tablet Take 4,000 Units by mouth daily    Yes Historical Provider, MD   Multiple Vitamins-Minerals (THERAPEUTIC MULTIVITAMIN-MINERALS) tablet Take 1 tablet by mouth daily   Yes Historical Provider, MD   lisinopril-hydrochlorothiazide (PRINZIDE;ZESTORETIC) 10-12.5 MG per tablet Take 1 tablet by mouth every other day     Historical Provider, MD   fenofibrate (TRICOR) 145 MG tablet Take 145 mg by mouth daily.     Historical Provider, MD       Current Medications:    Current Facility-Administered Food insecurity:     Worry: Not on file     Inability: Not on file    Transportation needs:     Medical: Not on file     Non-medical: Not on file   Tobacco Use    Smoking status: Current Some Day Smoker     Types: Cigars     Start date: 1/1/1980    Smokeless tobacco: Former User   Substance and Sexual Activity    Alcohol use: Yes     Comment: Occa    Drug use: No    Sexual activity: Not on file   Lifestyle    Physical activity:     Days per week: Not on file     Minutes per session: Not on file    Stress: Not on file   Relationships    Social connections:     Talks on phone: Not on file     Gets together: Not on file     Attends Confucianism service: Not on file     Active member of club or organization: Not on file     Attends meetings of clubs or organizations: Not on file     Relationship status: Not on file    Intimate partner violence:     Fear of current or ex partner: Not on file     Emotionally abused: Not on file     Physically abused: Not on file     Forced sexual activity: Not on file   Other Topics Concern    Not on file   Social History Narrative    Not on file       Family History:   Family History   Problem Relation Age of Onset    Stroke Father     Heart Disease Sister    Sister- MI [de-identified]61)  Mother- MI (48)  Father- CABG (62), CVA (76)    REVIEW OF SYSTEMS:     · Constitutional: + fatigue, denies fevers, chills or night sweats  · Eyes: Denies visual changes or drainage  · ENT: Denies headaches or hearing loss. No mouth sores or sore throat. No epistaxis   · Cardiovascular: + chest pain, pressure denies palpitations. No lower extremity swelling. · Respiratory: Denies CHILEL, cough, orthopnea or PND. No hemoptysis   · Gastrointestinal: Denies hematemesis or anorexia. No hematochezia or melena    · Genitourinary: Denies urgency, dysuria or hematuria.   · Musculoskeletal: Denies gait disturbance, weakness or joint complaints  · Integumentary: Denies rash, hives or pruritis   · Neurological: Denies dizziness, headaches or seizures. No numbness or tingling  · Psychiatric: Denies anxiety or depression. · Endocrine: Denies temperature intolerance. No recent weight change. .  · Hematologic/Lymphatic: Denies abnormal bruising or bleeding. No swollen lymph nodes    PHYSICAL EXAM:   BP (!) 142/89   Pulse 77   Temp 97.9 °F (36.6 °C) (Oral)   Resp 18   Ht 5' 5\" (1.651 m)   Wt 182 lb (82.6 kg)   SpO2 94%   BMI 30.29 kg/m²   CONST:  Well developed, well nourished who appears of stated age. Awake, alert and cooperative. No apparent distress. HEENT:   Head- Normocephalic, atraumatic   Eyes- Conjunctivae pink, anicteric  Throat- Oral mucosa pink and moist  Neck-  No stridor, trachea midline, no jugular venous distention. No carotid bruit. CHEST: Chest symmetrical and non-tender to palpation. No accessory muscle use or intercostal retractions  RESPIRATORY: Lung sounds - clear throughout fields   CARDIOVASCULAR:     Heart Inspection- shows no noted pulsations  Heart Palpation- no heaves or thrills; PMI is non-displaced   Heart Ausculation- Regular rate and rhythm, no murmur. No s3, s4 or rub   PV: No lower extremity edema. No varicosities. Pedal pulses palpable, no clubbing or cyanosis   ABDOMEN: Soft, non-tender to light palpation. Bowel sounds present. No palpable masses no organomegaly; no abdominal bruit  MS: Good muscle strength and tone. No atrophy or abnormal movements. : Deferred  SKIN: Warm and dry no statis dermatitis or ulcers   NEURO / PSYCH: Oriented to person, place and time. Speech clear and appropriate. Follows all commands. Pleasant affect     DATA:    ECG / Tele strips: NSR rate 85, PVCs, prior inf infarct pattern, QTc 476  RS: 4 beats WCT (4:03 am)    Diagnostic:  CXR:  Findings: The heart is unremarkable. The lung fields are unremarkable.   The aorta is tortuous and ectatic.      Impression:       Tortuous ectatic aorta           No intake or output data in the 24 hours ending 02/22/20 1003    Labs:   CBC:   Recent Labs     02/22/20  0007 02/22/20  0548   WBC 7.7 6.2   HGB 15.6 15.0   HCT 46.0 44.8    222     BMP:   Recent Labs     02/22/20  0007 02/22/20  0548    137   K 4.3 3.8   CO2 22 22   BUN 15 13   CREATININE 0.9 0.7   LABGLOM >60 >60   CALCIUM 9.4 9.2     Mag: No results for input(s): MG in the last 72 hours. Phos: No results for input(s): PHOS in the last 72 hours. TSH: No results for input(s): TSH in the last 72 hours. HgA1c: No results found for: LABA1C  No results found for: EAG  proBNP: No results for input(s): PROBNP in the last 72 hours. PT/INR:   Recent Labs     02/22/20 0007   PROTIME 11.2   INR 0.9     APTT:  Recent Labs     02/22/20 0007   APTT 30.9     CARDIAC ENZYMES:  Recent Labs     02/22/20  0007 02/22/20  0548   TROPONINI <0.01 <0.01     FASTING LIPID PANEL:  Lab Results   Component Value Date    CHOL 163 02/22/2020    HDL 41 02/22/2020    LDLCALC - 02/22/2020    TRIG 500 02/22/2020     LIVER PROFILE:  Recent Labs     02/22/20  0548   AST 26   ALT 34   LABALBU 4.3     A & P are to follow as per Tor Kent  Cardiology    Electronically signed by CHILO Avila on 2/22/2020 at 10:03 AM

## 2020-02-22 NOTE — ED PROVIDER NOTES
The patient is a 77-year-old male who presents to the emergency department with complaint of left-sided chest pain described as numbness and tingling that is radiating down the left arm and up to his left neck to his left jaw. Symptoms started about an hour ago while the patient was driving. He has a history of hypertension, hyperlipidemia, paroxysmal A. fib. However, he is not on any chronic anticoagulation stating that he only takes baby aspirin daily. He also is no longer on blood pressure medications stating that he had been pretty well controlled with diet. Patient describes his symptoms as constant, numbness and tingling, shortness of breath, 5/10 in severity, with radiation as previously described. Symptoms are not worsened or improved by anything. He denies any prior history of the symptoms. He admits to drinking 4 alcoholic beverages per day but denies smoking cigarettes or using any drugs. The history is provided by the patient. Review of Systems   Constitutional: Negative for activity change, appetite change, chills, diaphoresis, fatigue and fever. HENT: Negative for congestion and sore throat. Eyes: Negative for visual disturbance. Respiratory: Positive for shortness of breath. Negative for cough. Cardiovascular: Positive for chest pain. Negative for palpitations and leg swelling. Gastrointestinal: Negative for abdominal pain, constipation, diarrhea, nausea and vomiting. Endocrine: Negative for polyuria. Genitourinary: Negative for decreased urine volume, difficulty urinating, dysuria, flank pain and hematuria. Musculoskeletal: Negative for arthralgias, back pain, joint swelling, myalgias, neck pain and neck stiffness. Skin: Negative for color change, pallor, rash and wound. Allergic/Immunologic: Negative for immunocompromised state. Neurological: Positive for numbness (Left perioral, left jaw, left neck, left shoulder, left arm).  Negative for dizziness, seizures, syncope, weakness, light-headedness and headaches. Hematological: Negative for adenopathy. Does not bruise/bleed easily. Psychiatric/Behavioral: Negative. Physical Exam  Vitals signs and nursing note reviewed. Constitutional:       General: He is not in acute distress. Appearance: He is well-developed. He is not ill-appearing, toxic-appearing or diaphoretic. HENT:      Head: Normocephalic and atraumatic. Right Ear: External ear normal.      Left Ear: External ear normal.      Nose: Nose normal. No congestion or rhinorrhea. Mouth/Throat:      Pharynx: No oropharyngeal exudate. Eyes:      General: No scleral icterus. Right eye: No discharge. Left eye: No discharge. Extraocular Movements: Extraocular movements intact. Conjunctiva/sclera: Conjunctivae normal.      Pupils: Pupils are equal, round, and reactive to light. Neck:      Musculoskeletal: Normal range of motion and neck supple. No neck rigidity or muscular tenderness. Thyroid: No thyromegaly. Vascular: No JVD. Trachea: No tracheal deviation. Cardiovascular:      Rate and Rhythm: Normal rate and regular rhythm. Pulses:           Radial pulses are 2+ on the right side and 2+ on the left side. Heart sounds: Normal heart sounds, S1 normal and S2 normal. Heart sounds not distant. No murmur. No friction rub. No gallop. No S3 or S4 sounds. Pulmonary:      Effort: Pulmonary effort is normal. No tachypnea, accessory muscle usage or respiratory distress. Breath sounds: Normal breath sounds. No stridor, decreased air movement or transmitted upper airway sounds. No decreased breath sounds, wheezing, rhonchi or rales. Chest:      Chest wall: No mass, lacerations, deformity, swelling, tenderness, crepitus or edema. Abdominal:      General: Bowel sounds are normal. There is no distension. Palpations: Abdomen is soft.  There is no hepatomegaly, splenomegaly, mass or suspicious  +0    EKG  Significant ST depression +2  Non specific repolarization +1  Normal    +0    AGE  >=65    +2  45-65    +1  <45    +0    RISK FACTORS*  > or = 3 risk factors or  +2    history of atherosclerotic    disease. 1-2 risk factors  +1  No risk factors   +0    TROPONIN  >= 3 x normal limit  +2  1-3 x normal limit  +1  Within lormal range  +0    Total    4    *Risk factors  Risk factors: HTN, hypercholesterolemia, DM, obesity (BMI >30 kg/m²), smoking (current, or smoking cessation =3 mo), positive family history (parent or sibling with CVD before age 72); atherosclerotic disease: prior MI, PCI/CABG, CVA/TIA, or peripheral arterial disease    INTERPRETATION  0-3: 0.9-1.7% risk of adverse cardiac event. In the HEART Score, these patients were discharged. (0.99% retrospective)  (1.7% prospective)  4-6: 12-16.6% risk of adverse cardiac event. In the HEART Score, these patients were admitted to the hospital.    (11.6% retrospective) (16.6% prospective)  > or =7: 50-65% risk of adverse cardiac event. In the HEART Score, these patients were candidates for early invasive  measures.    (65.2% retrospective) (50.1% prospective)    MACE (Major Adverse Cardiac Events) is defined as: all-cause mortality, myocardial infarction, or coronary revascularization. ED Course as of Feb 22 0112   Sat Feb 22, 2020   0045 Dr. Andrés Prasad discussed case with Madi Foreman NP. He agrees accept the patient    [LS]      ED Course User Index  [LS] Nao Kearns DO        ED Course as of Feb 22 0113   Sat Feb 22, 2020   Jeromy Prasad discussed case with Madi Foreman NP.   He agrees accept the patient    [LS]      ED Course User Index  [LS] Noa Kearns DO       --------------------------------------------- PAST HISTORY ---------------------------------------------  Past Medical History:  has a past medical history of Abnormal stress test, [facility] Chronic back pain, Hyperlipidemia, Hypertension, and [facility] Past Surgical History:  has a past surgical history that includes Small intestine surgery; lymph node biopsy; Spine surgery; Colonoscopy; and Rotator cuff repair. Social History:  reports that he has been smoking cigars. He started smoking about 40 years ago. He has quit using smokeless tobacco. He reports current alcohol use. He reports that he does not use drugs. Family History: family history includes Heart Disease in his sister; Stroke in his father. The patients home medications have been reviewed. Allergies: Patient has no known allergies.     -------------------------------------------------- RESULTS -------------------------------------------------    LABS:  Results for orders placed or performed during the hospital encounter of 02/21/20   CBC Auto Differential   Result Value Ref Range    WBC 7.7 4.5 - 11.5 E9/L    RBC 5.27 3.80 - 5.80 E12/L    Hemoglobin 15.6 12.5 - 16.5 g/dL    Hematocrit 46.0 37.0 - 54.0 %    MCV 87.3 80.0 - 99.9 fL    MCH 29.6 26.0 - 35.0 pg    MCHC 33.9 32.0 - 34.5 %    RDW 12.4 11.5 - 15.0 fL    Platelets 736 376 - 147 E9/L    MPV 10.4 7.0 - 12.0 fL    Neutrophils % 57.3 43.0 - 80.0 %    Immature Granulocytes % 0.3 0.0 - 5.0 %    Lymphocytes % 24.9 20.0 - 42.0 %    Monocytes % 12.8 (H) 2.0 - 12.0 %    Eosinophils % 3.5 0.0 - 6.0 %    Basophils % 1.2 0.0 - 2.0 %    Neutrophils Absolute 4.43 1.80 - 7.30 E9/L    Immature Granulocytes # 0.02 E9/L    Lymphocytes Absolute 1.92 1.50 - 4.00 E9/L    Monocytes Absolute 0.99 (H) 0.10 - 0.95 E9/L    Eosinophils Absolute 0.27 0.05 - 0.50 E9/L    Basophils Absolute 0.09 0.00 - 0.20 F9/E   Basic Metabolic Panel w/ Reflex to MG   Result Value Ref Range    Sodium 134 132 - 146 mmol/L    Potassium reflex Magnesium 4.3 3.5 - 5.0 mmol/L    Chloride 99 98 - 107 mmol/L    CO2 22 22 - 29 mmol/L    Anion Gap 13 7 - 16 mmol/L    Glucose 103 (H) 74 - 99 mg/dL    BUN 15 8 - 23 mg/dL    CREATININE 0.9 0.7 - 1.2 mg/dL    GFR included: a personal history and physicial examination, re-evaluation prior to disposition, multiple bedside re-evaluations, cardiac monitoring, continuous pulse oximetry and complex medical decision making and emergency management    This patient has remained hemodynamically stable during their ED course. Diagnosis:  1. Other chest pain        Disposition:  Patient's disposition: Admit to telemetry  Patient's condition is stable.             Bibi Dorsey DO  Resident  02/22/20 5811

## 2020-02-24 ENCOUNTER — TELEPHONE (OUTPATIENT)
Dept: CARDIOLOGY CLINIC | Age: 65
End: 2020-02-24

## 2020-02-24 LAB
EKG ATRIAL RATE: 75 BPM
EKG P AXIS: 21 DEGREES
EKG P-R INTERVAL: 180 MS
EKG Q-T INTERVAL: 402 MS
EKG QRS DURATION: 80 MS
EKG QTC CALCULATION (BAZETT): 408 MS
EKG R AXIS: -14 DEGREES
EKG T AXIS: -2 DEGREES
EKG VENTRICULAR RATE: 62 BPM

## 2020-02-24 PROCEDURE — 93010 ELECTROCARDIOGRAM REPORT: CPT | Performed by: INTERNAL MEDICINE

## 2020-02-25 NOTE — TELEPHONE ENCOUNTER
Patient states he will ask his family doctor if he needs to come in. He does not wish to schedule a hfu appt at this time.

## 2020-04-09 ENCOUNTER — OFFICE VISIT (OUTPATIENT)
Dept: NEUROLOGY | Age: 65
End: 2020-04-09
Payer: MEDICARE

## 2020-04-09 VITALS
TEMPERATURE: 97 F | BODY MASS INDEX: 30.32 KG/M2 | HEIGHT: 65 IN | OXYGEN SATURATION: 95 % | HEART RATE: 72 BPM | WEIGHT: 182 LBS | DIASTOLIC BLOOD PRESSURE: 97 MMHG | SYSTOLIC BLOOD PRESSURE: 146 MMHG

## 2020-04-09 PROCEDURE — G8427 DOCREV CUR MEDS BY ELIG CLIN: HCPCS | Performed by: PSYCHIATRY & NEUROLOGY

## 2020-04-09 PROCEDURE — 99204 OFFICE O/P NEW MOD 45 MIN: CPT | Performed by: PSYCHIATRY & NEUROLOGY

## 2020-04-09 PROCEDURE — G8417 CALC BMI ABV UP PARAM F/U: HCPCS | Performed by: PSYCHIATRY & NEUROLOGY

## 2020-04-09 PROCEDURE — 2022F DILAT RTA XM EVC RTNOPTHY: CPT | Performed by: PSYCHIATRY & NEUROLOGY

## 2020-04-09 RX ORDER — ATORVASTATIN CALCIUM 40 MG/1
40 TABLET, FILM COATED ORAL DAILY
Qty: 30 TABLET | Refills: 3 | Status: SHIPPED
Start: 2020-04-09 | End: 2021-01-25

## 2020-04-09 RX ORDER — CLOPIDOGREL BISULFATE 75 MG/1
75 TABLET ORAL DAILY
Qty: 21 TABLET | Refills: 0 | Status: SHIPPED
Start: 2020-04-09 | End: 2020-07-09

## 2020-04-09 SDOH — HEALTH STABILITY: MENTAL HEALTH: HOW OFTEN DO YOU HAVE A DRINK CONTAINING ALCOHOL?: 2-3 TIMES A WEEK

## 2020-04-09 ASSESSMENT — ENCOUNTER SYMPTOMS
VOMITING: 0
SHORTNESS OF BREATH: 0
NAUSEA: 0
PHOTOPHOBIA: 0
TROUBLE SWALLOWING: 0

## 2020-04-10 LAB
AVERAGE GLUCOSE: NORMAL
HBA1C MFR BLD: 6.2 %

## 2020-04-10 NOTE — RESULT ENCOUNTER NOTE
Please inform the patient. Hemoglobin A1c is high at 6.2. Control with diet and exercise and follow-up with primary care physician for further recommendation.

## 2020-04-15 ENCOUNTER — TELEPHONE (OUTPATIENT)
Dept: NEUROLOGY | Age: 65
End: 2020-04-15

## 2020-05-05 ENCOUNTER — VIRTUAL VISIT (OUTPATIENT)
Dept: NEUROLOGY | Age: 65
End: 2020-05-05
Payer: MEDICARE

## 2020-05-05 PROCEDURE — G8428 CUR MEDS NOT DOCUMENT: HCPCS | Performed by: PSYCHIATRY & NEUROLOGY

## 2020-05-05 PROCEDURE — 99214 OFFICE O/P EST MOD 30 MIN: CPT | Performed by: PSYCHIATRY & NEUROLOGY

## 2020-05-05 PROCEDURE — 4040F PNEUMOC VAC/ADMIN/RCVD: CPT | Performed by: PSYCHIATRY & NEUROLOGY

## 2020-05-05 PROCEDURE — 1123F ACP DISCUSS/DSCN MKR DOCD: CPT | Performed by: PSYCHIATRY & NEUROLOGY

## 2020-05-05 PROCEDURE — 3017F COLORECTAL CA SCREEN DOC REV: CPT | Performed by: PSYCHIATRY & NEUROLOGY

## 2020-05-05 RX ORDER — BLOOD PRESSURE TEST KIT
KIT MISCELLANEOUS
Qty: 1 KIT | Refills: 0 | Status: SHIPPED | OUTPATIENT
Start: 2020-05-05

## 2020-05-05 ASSESSMENT — ENCOUNTER SYMPTOMS
TROUBLE SWALLOWING: 0
NAUSEA: 0
PHOTOPHOBIA: 0
VOMITING: 0
SHORTNESS OF BREATH: 0

## 2020-05-05 NOTE — PROGRESS NOTES
nausea, vomiting, headache, blurred vision, speech or swallowing problems, tingling or numbness. All other review of systems negative apart from what is mentioned in the HPI    I have personally reviewed the CT scan of the head.:  Bilateral basal ganglia infarct.     PAST MEDICAL HISTORY:   Past Medical History:   Diagnosis Date    Abnormal stress test 4/5/2017    Cancer [facility])     Testicular    Chronic back pain     Hyperlipidemia     Hypertension     PAF [facility]paroxysmal atrial fibrillation) ([facility] 4/5/2017         PAST SURGICAL HISTORY:   Past Surgical History:   Procedure Laterality Date    COLONOSCOPY      LYMPH NODE BIOPSY      ROTATOR CUFF REPAIR      SMALL INTESTINE SURGERY      SPINE SURGERY           FAMILY MEDICAL HISTORY:   Family History   Problem Relation Age of Onset    Stroke Father     Heart Disease Sister          SOCIAL HISTORY:   Social History     Socioeconomic History    Marital status:      Spouse name: Not on file    Number of children: Not on file    Years of education: Not on file    Highest education level: Not on file   Occupational History    Not on file   Social Needs    Financial resource strain: Not on file    Food insecurity     Worry: Not on file     Inability: Not on file    Transportation needs     Medical: Not on file     Non-medical: Not on file   Tobacco Use    Smoking status: Former Smoker     Types: Cigars     Start date: 1/1/1980    Smokeless tobacco: Former User   Substance and Sexual Activity    Alcohol use: Yes     Frequency: 2-3 times a week    Drug use: No    Sexual activity: Not on file   Lifestyle    Physical activity     Days per week: Not on file     Minutes per session: Not on file    Stress: Not on file   Relationships    Social connections     Talks on phone: Not on file     Gets together: Not on file     Attends Congregation service: Not on file     Active member of club or organization: Not on file     Attends meetings of clubs or Phosphatase   Date Value Ref Range Status   02/22/2020 91 40 - 129 U/L Final   06/02/2019 88 40 - 129 U/L Final     ALT   Date Value Ref Range Status   02/22/2020 34 0 - 40 U/L Final   06/02/2019 25 0 - 40 U/L Final     AST   Date Value Ref Range Status   02/22/2020 26 0 - 39 U/L Final   06/02/2019 23 0 - 39 U/L Final     Comment:     Specimen is slightly Hemolyzed. Result may be artificially increased. Lab Results   Component Value Date    INR 0.9 02/22/2020     Lab Results   Component Value Date    TRIG 500 (H) 02/22/2020    HDL 41 02/22/2020     No components found for: HGBA1C  No results found for: PROTEINCSF, GLUCCSF, WBCCSF    Controlled Substance Monitoring:    Acute and Chronic Pain Monitoring:   No flowsheet data found. MEDICAL DECISION MAKING    ASSESSMENT    Stroke    Hypertension    Hyperlipidemia    History of single episode of atrial fibrillation in 2017 not on anticoagulation. Coronary artery disease    ETOH use    Former smoker    PLAN    Continue aspirin 81 mg daily    MRI brain, MRA head and neck    Continue on Lipitor 40 mg daily, gemfibrozil and vascepa    Recheck lipid profile    Check blood pressure daily at home. Hemoglobin A1c: 6.2    The patient was counseled on alcohol drinking. He has quit smoking many years ago. The patient was also counseled on importance of diet and exercise. Follow-up in 4 months. Today, I personally spent a great amount of time directly face-to-face time with the patient, of which greater than 50% was spent in patient education, counseling,about etiology management and diagnosis of stroke, risk factors, stroke prevention strategies. Side effects of medications including Lipitor, aspirin, Plavix, gemfibrozil and Vascepa were discussed in detail with the patient, verbalizes understanding and agrees to it. And coordination of care as described above.     Patient's current medication list, allergies, problem list and results of all previously

## 2020-06-01 ENCOUNTER — HOSPITAL ENCOUNTER (OUTPATIENT)
Dept: MRI IMAGING | Age: 65
Discharge: HOME | End: 2020-06-03
Payer: MEDICARE

## 2020-06-01 PROCEDURE — 70551 MRI BRAIN STEM W/O DYE: CPT

## 2020-06-01 PROCEDURE — 70544 MR ANGIOGRAPHY HEAD W/O DYE: CPT

## 2020-06-01 PROCEDURE — 70547 MR ANGIOGRAPHY NECK W/O DYE: CPT

## 2020-06-02 ENCOUNTER — TELEPHONE (OUTPATIENT)
Dept: NEUROLOGY | Age: 65
End: 2020-06-02

## 2020-06-02 NOTE — TELEPHONE ENCOUNTER
----- Message from Michael Garces MD sent at 6/1/2020  9:38 AM EDT -----  Please inform the patient that her MRI brain and MRA head and neck is normal.  It shows some age-related changes, nothing to do

## 2020-07-09 ENCOUNTER — HOSPITAL ENCOUNTER (EMERGENCY)
Age: 65
Discharge: HOME | End: 2020-07-09
Attending: EMERGENCY MEDICINE
Payer: MEDICARE

## 2020-07-09 ENCOUNTER — OFFICE VISIT (OUTPATIENT)
Dept: PODIATRY | Age: 65
End: 2020-07-09
Payer: MEDICARE

## 2020-07-09 ENCOUNTER — HOSPITAL ENCOUNTER (OUTPATIENT)
Age: 65
Discharge: HOME | End: 2020-07-11
Payer: MEDICARE

## 2020-07-09 VITALS
DIASTOLIC BLOOD PRESSURE: 64 MMHG | BODY MASS INDEX: 31.07 KG/M2 | SYSTOLIC BLOOD PRESSURE: 118 MMHG | TEMPERATURE: 98.2 F | WEIGHT: 181 LBS

## 2020-07-09 VITALS
WEIGHT: 180 LBS | TEMPERATURE: 98.8 F | SYSTOLIC BLOOD PRESSURE: 150 MMHG | BODY MASS INDEX: 30.73 KG/M2 | HEIGHT: 64 IN | HEART RATE: 63 BPM | RESPIRATION RATE: 16 BRPM | DIASTOLIC BLOOD PRESSURE: 100 MMHG | OXYGEN SATURATION: 97 %

## 2020-07-09 LAB
BASOPHILS ABSOLUTE: 0.11 E9/L (ref 0–0.2)
BASOPHILS RELATIVE PERCENT: 1.8 % (ref 0–2)
CHOLESTEROL, TOTAL: 151 MG/DL (ref 0–199)
EOSINOPHILS ABSOLUTE: 0.26 E9/L (ref 0.05–0.5)
EOSINOPHILS RELATIVE PERCENT: 4.2 % (ref 0–6)
HCT VFR BLD CALC: 46.3 % (ref 37–54)
HDLC SERPL-MCNC: 56 MG/DL
HEMOGLOBIN: 14.7 G/DL (ref 12.5–16.5)
IMMATURE GRANULOCYTES #: 0.01 E9/L
IMMATURE GRANULOCYTES %: 0.2 % (ref 0–5)
LDL CHOLESTEROL CALCULATED: 47 MG/DL (ref 0–99)
LYMPHOCYTES ABSOLUTE: 1.73 E9/L (ref 1.5–4)
LYMPHOCYTES RELATIVE PERCENT: 28.1 % (ref 20–42)
MCH RBC QN AUTO: 29.6 PG (ref 26–35)
MCHC RBC AUTO-ENTMCNC: 31.7 % (ref 32–34.5)
MCV RBC AUTO: 93.3 FL (ref 80–99.9)
MONOCYTES ABSOLUTE: 0.79 E9/L (ref 0.1–0.95)
MONOCYTES RELATIVE PERCENT: 12.8 % (ref 2–12)
NEUTROPHILS ABSOLUTE: 3.25 E9/L (ref 1.8–7.3)
NEUTROPHILS RELATIVE PERCENT: 52.9 % (ref 43–80)
PDW BLD-RTO: 12.4 FL (ref 11.5–15)
PLATELET # BLD: 280 E9/L (ref 130–450)
PMV BLD AUTO: 10.7 FL (ref 7–12)
RBC # BLD: 4.96 E12/L (ref 3.8–5.8)
TRIGL SERPL-MCNC: 239 MG/DL (ref 0–149)
URIC ACID, SERUM: 6.4 MG/DL (ref 3.4–7)
VLDLC SERPL CALC-MCNC: 48 MG/DL
WBC # BLD: 6.2 E9/L (ref 4.5–11.5)

## 2020-07-09 PROCEDURE — 84550 ASSAY OF BLOOD/URIC ACID: CPT

## 2020-07-09 PROCEDURE — 80061 LIPID PANEL: CPT

## 2020-07-09 PROCEDURE — 6370000000 HC RX 637 (ALT 250 FOR IP): Performed by: FAMILY MEDICINE

## 2020-07-09 PROCEDURE — 4040F PNEUMOC VAC/ADMIN/RCVD: CPT | Performed by: PODIATRIST

## 2020-07-09 PROCEDURE — 99282 EMERGENCY DEPT VISIT SF MDM: CPT

## 2020-07-09 PROCEDURE — 36415 COLL VENOUS BLD VENIPUNCTURE: CPT

## 2020-07-09 PROCEDURE — 1123F ACP DISCUSS/DSCN MKR DOCD: CPT | Performed by: PODIATRIST

## 2020-07-09 PROCEDURE — G8417 CALC BMI ABV UP PARAM F/U: HCPCS | Performed by: PODIATRIST

## 2020-07-09 PROCEDURE — 20600 DRAIN/INJ JOINT/BURSA W/O US: CPT | Performed by: PODIATRIST

## 2020-07-09 PROCEDURE — 85025 COMPLETE CBC W/AUTO DIFF WBC: CPT

## 2020-07-09 PROCEDURE — 1036F TOBACCO NON-USER: CPT | Performed by: PODIATRIST

## 2020-07-09 PROCEDURE — 3017F COLORECTAL CA SCREEN DOC REV: CPT | Performed by: PODIATRIST

## 2020-07-09 PROCEDURE — G8427 DOCREV CUR MEDS BY ELIG CLIN: HCPCS | Performed by: PODIATRIST

## 2020-07-09 PROCEDURE — 99203 OFFICE O/P NEW LOW 30 MIN: CPT | Performed by: PODIATRIST

## 2020-07-09 RX ORDER — ATORVASTATIN CALCIUM 20 MG/1
TABLET, FILM COATED ORAL
COMMUNITY
Start: 2020-06-09 | End: 2020-07-09

## 2020-07-09 RX ORDER — HYDROCODONE BITARTRATE AND ACETAMINOPHEN 5; 325 MG/1; MG/1
1 TABLET ORAL ONCE
Status: COMPLETED | OUTPATIENT
Start: 2020-07-09 | End: 2020-07-09

## 2020-07-09 RX ORDER — COLCHICINE 0.6 MG/1
0.6 TABLET ORAL DAILY
Qty: 7 TABLET | Refills: 0 | Status: SHIPPED
Start: 2020-07-09 | End: 2020-07-30

## 2020-07-09 RX ORDER — ALLOPURINOL 100 MG/1
TABLET ORAL
COMMUNITY
Start: 2020-07-01

## 2020-07-09 RX ORDER — BUPIVACAINE HYDROCHLORIDE 5 MG/ML
1 INJECTION, SOLUTION PERINEURAL ONCE
Status: COMPLETED | OUTPATIENT
Start: 2020-07-09 | End: 2020-07-09

## 2020-07-09 RX ORDER — BETAMETHASONE SODIUM PHOSPHATE AND BETAMETHASONE ACETATE 3; 3 MG/ML; MG/ML
6 INJECTION, SUSPENSION INTRA-ARTICULAR; INTRALESIONAL; INTRAMUSCULAR; SOFT TISSUE ONCE
Status: COMPLETED | OUTPATIENT
Start: 2020-07-09 | End: 2020-07-09

## 2020-07-09 RX ORDER — HYDROCODONE BITARTRATE AND ACETAMINOPHEN 5; 325 MG/1; MG/1
1 TABLET ORAL EVERY 6 HOURS PRN
Qty: 18 TABLET | Refills: 0 | Status: SHIPPED | OUTPATIENT
Start: 2020-07-09 | End: 2020-07-12

## 2020-07-09 RX ORDER — CEPHALEXIN 250 MG/1
500 CAPSULE ORAL 4 TIMES DAILY
Qty: 20 CAPSULE | Refills: 0 | Status: SHIPPED | OUTPATIENT
Start: 2020-07-09 | End: 2020-07-30

## 2020-07-09 RX ORDER — CEPHALEXIN 250 MG/1
250 CAPSULE ORAL 4 TIMES DAILY
Qty: 20 CAPSULE | Refills: 0 | Status: SHIPPED | OUTPATIENT
Start: 2020-07-09 | End: 2020-07-09 | Stop reason: SDUPTHER

## 2020-07-09 RX ADMIN — BETAMETHASONE SODIUM PHOSPHATE AND BETAMETHASONE ACETATE 6 MG: 3; 3 INJECTION, SUSPENSION INTRA-ARTICULAR; INTRALESIONAL; INTRAMUSCULAR; SOFT TISSUE at 14:10

## 2020-07-09 RX ADMIN — BUPIVACAINE HYDROCHLORIDE 5 MG: 5 INJECTION, SOLUTION PERINEURAL at 14:10

## 2020-07-09 RX ADMIN — HYDROCODONE BITARTRATE AND ACETAMINOPHEN 1 TABLET: 5; 325 TABLET ORAL at 07:52

## 2020-07-09 ASSESSMENT — ENCOUNTER SYMPTOMS
NAUSEA: 0
RHINORRHEA: 0
COUGH: 0
SHORTNESS OF BREATH: 0
DIARRHEA: 0
CONSTIPATION: 0
ABDOMINAL PAIN: 0

## 2020-07-09 ASSESSMENT — PAIN SCALES - GENERAL
PAINLEVEL_OUTOF10: 5
PAINLEVEL_OUTOF10: 7
PAINLEVEL_OUTOF10: 5

## 2020-07-09 ASSESSMENT — PAIN - FUNCTIONAL ASSESSMENT: PAIN_FUNCTIONAL_ASSESSMENT: PREVENTS OR INTERFERES SOME ACTIVE ACTIVITIES AND ADLS

## 2020-07-09 ASSESSMENT — PAIN DESCRIPTION - FREQUENCY: FREQUENCY: CONTINUOUS

## 2020-07-09 ASSESSMENT — PAIN DESCRIPTION - LOCATION: LOCATION: FOOT

## 2020-07-09 ASSESSMENT — PAIN DESCRIPTION - ORIENTATION: ORIENTATION: RIGHT

## 2020-07-09 ASSESSMENT — PAIN DESCRIPTION - PAIN TYPE: TYPE: ACUTE PAIN

## 2020-07-09 ASSESSMENT — PAIN DESCRIPTION - PROGRESSION: CLINICAL_PROGRESSION: GRADUALLY WORSENING

## 2020-07-09 NOTE — PROGRESS NOTES
[facility]  [address] [facility] [place] [address]   Dept: [phone]  Dept Fax: [phone]    NEW PATIENT PROGRESS NOTE  Date of patient's visit: 7/9/2020  Patient's Name:  Pennie Evans YOB: 1955            Patient Care Team:  Brenda Broussard DO as PCP - Sharon Orozco MD as Consulting Physician (Cardiology)        Chief Complaint   Patient presents with   Silva Chiang Doctor     referred by former pt Trevor Meyer. for pain in foot went to ER today they stated it was gouty attack gave him ATB and Pain meds no xrays or labs        Toe Pain    The incident occurred 5 to 7 days ago. There was no injury mechanism. The pain is present in the right toes. The quality of the pain is described as stabbing, shooting and burning. The pain is at a severity of 10/10. The pain is severe. The pain has been worsening since onset. He reports no foreign bodies present. The symptoms are aggravated by weight bearing. HPI:   Pennie Evans is a 71 y.o. male who presents to the office today complaining of right great toe pain. Patient states that his right great toe started bothering him about a week ago trauma noted patient has had gout in the past..  Symptoms began 1 week(s) ago. Patient relates pain is Present. Pain is rated 9 out of 10 and is described as constant. Treatments prior to today's visit include: soaking  . Currently denies F/C/N/V. Pt's primary care physician is Brenda Broussard DO last seen     No Known Allergies    Past Medical History:   Diagnosis Date    Abnormal stress test 4/5/2017    [facility])     Testicular    Chronic back pain     Hyperlipidemia     Hypertension     PAF (paroxysmal atrial fibrillation) ([facility] 4/5/2017       Prior to Admission medications    Medication Sig Start Date End Date Taking?  Authorizing Provider   HYDROcodone-acetaminophen (NORCO) 5-325 MG per tablet Take 1 tablet by mouth every 6 hours as needed for Pain for up to 3 days. Intended supply: 3 days. Take lowest dose possible to manage pain 7/9/20 7/12/20 Yes Heron Cervantes MD   cephALEXin [facility]) 250 MG capsule Take 2 capsules by mouth 4 times daily 7/9/20  Yes Heron Cervantes MD   allopurinol (ZYLOPRIM) 100 MG tablet TAKE 1 TABLET BY MOUTH EVERY DAY 7/1/20  Yes Historical Provider, MD   colchicine (COLCRYS) 0.6 MG tablet Take 1 tablet by mouth daily for 7 days 2 tabs  Qd today    Then 1 tab po x 6 days 7/9/20 7/16/20 Yes Abdulaziz Chaney DPM   Blood Pressure KIT Check BP  Once daily 5/5/20  Yes Eryn Stern MD   atorvastatin (LIPITOR) 40 MG tablet Take 1 tablet by mouth daily 4/9/20  Yes Eryn Stern MD   ibuprofen (IBU) 600 MG tablet Take 1 tablet by mouth every 8 hours as needed for Pain Take with food. 6/3/19  Yes Delmy Nicolas DO   aspirin EC 81 MG EC tablet Take 81 mg by mouth daily   Yes Historical Provider, MD   magnesium gluconate (MAGONATE) 500 MG tablet Take 500 mg by mouth 2 times daily   Yes Historical Provider, MD   psyllium (KONSYL) 28.3 % PACK Take 1 packet by mouth daily   Yes Historical Provider, MD   gemfibrozil (LOPID) 600 MG tablet TAKE 1 TABLET BY MOUTH TWICE A DAY 3/1/17  Yes Historical Provider, MD   VASCEPA 1 G CAPS capsule TAKE TWO CAPSULES TWO TIMES A DAY 2/20/17  Yes Historical Provider, MD   vitamin D (CHOLECALCIFEROL) 1000 UNIT TABS tablet Take 4,000 Units by mouth daily    Yes Historical Provider, MD   Multiple Vitamins-Minerals (THERAPEUTIC MULTIVITAMIN-MINERALS) tablet Take 1 tablet by mouth daily   Yes Historical Provider, MD   fenofibrate (TRICOR) 145 MG tablet Take 145 mg by mouth daily.    Yes Historical Provider, MD   lisinopril-hydrochlorothiazide (PRINZIDE;ZESTORETIC) 10-12.5 MG per tablet Take 1 tablet by mouth every other day     Historical Provider, MD       Past Surgical History:   Procedure Laterality Date    COLONOSCOPY      LYMPH NODE BIOPSY      ROTATOR CUFF REPAIR      SMALL INTESTINE SURGERY      SPINE SURGERY         Family History   Problem Relation Age of Onset    Stroke Father     Heart Disease Sister        Social History     Tobacco Use    Smoking status: Former Smoker     Types: Cigars     Start date: 1/1/1980    Smokeless tobacco: Former User   Substance Use Topics    Alcohol use: Yes     Frequency: 2-3 times a week       Review of Systems    Review of Systems:   History obtained from chart review and the patient  General ROS: negative for - chills, fatigue, fever, night sweats or weight gain  Constitutional: Negative for chills, diaphoresis, fatigue, fever and unexpected weight change. Musculoskeletal: Positive for . Neurological ROS: negative for - behavioral changes, confusion, headaches or seizures. Negative for weakness and numbness. Dermatological ROS: negative for - mole changes, rash  Cardiovascular: Negative for leg swelling. Gastrointestinal: Negative for constipation, diarrhea, nausea and vomiting.                Lower Extremity Physical Examination:   Vitals:   Vitals:    07/09/20 1310   BP: 118/64   Temp: 98.2 °F (36.8 °C)        Foot Exam    General  General Appearance: appears stated age and healthy   Orientation: alert and oriented to person, place, and time   Affect: appropriate       Right Foot/Ankle     Inspection and Palpation  Ecchymosis: none  Tenderness: bony tenderness and great toe metatarsophalangeal joint   Swelling: dorsum and great toe metatarsophalangeal joint   Skin Exam: erythema; (First metatarsal phalangeal joint swollen erythematous warm to touch)    Neurovascular  Dorsalis pedis: 3+  Posterior tibial: 3+  Saphenous nerve sensation: normal  Tibial nerve sensation: normal  Superficial peroneal nerve sensation: normal  Deep peroneal nerve sensation: normal  Sural nerve sensation: normal  Achilles reflex: 2+  Babinski reflex: 2+    Muscle Strength  Ankle dorsiflexion: 5  Ankle plantar flexion: 5  Ankle inversion: 5  Ankle eversion: 5  Great toe extension: 5  Great toe flexion: 5    Range of Motion    Normal right ankle ROM          Right Ankle Exam     Range of Motion   The patient has normal right ankle ROM. Muscle Strength   Dorsiflexion:  5/5  Plantar flexion:  5/5            General: AAO x 3 in NAD. Dermatologic Exam:  Skin lesion/ulceration Absent . Skin No rashes or nodules noted. .   Musculoskeletal:     1st MPJ ROM decreased, Right. Muscle strength 5/5, Bilateral.  Pain present upon palpation of 1st mpj, Right. Vascular:     Radiographs:  3 views Xr Foot Right (min 3 Views)    Result Date: 7/9/2020  XRAY INTERPREATION Indication: PAIN Examination: 3 VIEWS Narrative: 3 views of the right foot show no fractures noted there is soft tissue swelling around the first metatarsal phalangeal joint Impression inflammation first metatarsal phalangeal joint indicative of gout    foot/ankle:     Asessment: Patient is a 71 y.o. male with:    Diagnosis Orders   1. Acute gout involving toe of right foot, unspecified cause  CBC WITH AUTO DIFFERENTIAL    URIC ACID   2. Pain in right foot  XR FOOT RIGHT (MIN 3 VIEWS)       Plan: Patient examined and evaluated. Today the patient was given an x-ray we reviewed no fractures noted injection first metatarsal phalangeal joint. Patient is to wear comfortable shoe he is not to soak her foot warm water on right foot. Patient was given Colcrys 2 tabs daily today then 1 a day for 6 days patient is to stop taking Colcrys if any issues with GI upset. New blood work was ordered. Current condition and treatment options discussed in detail. Discussed conservative and surgical options with the patient. Treatment options today consisted of verbal and written instructions given to patient. Contact office with any questions/problems/concerns. RTC in 2week(s).     7/9/2020  Right first metatarsal phalangeal joint injection:  Potential risks, complications, alternative treatment options and procedure prognosis were explained to the patient. Verbal and signed informed consent were obtained from the patient. An antiseptic preparation of the skin was performed with ChloraPrep 1 cc 0.5% Marcaine plain 1cc betamethasone acetate phosphate no adverse reaction was observed. The puncture site was covered with an adhesive bandage and the patient was fitted with a removable metatarsal pad. Post injection instructions were given.         Electronically signed by Ivonne Nichols DPM on 7/9/2020 at 2:10 PM  7/9/2020

## 2020-07-09 NOTE — ED PROVIDER NOTES
notes some tingling and burning in the area. Denies any fevers or chills. Review of Systems:   Review of Systems   Constitutional: Negative for chills, fatigue and fever. HENT: Negative for congestion and rhinorrhea. Respiratory: Negative for cough and shortness of breath. Cardiovascular: Negative for chest pain, palpitations and leg swelling. Gastrointestinal: Negative for abdominal pain, constipation, diarrhea and nausea. Genitourinary: Negative for dysuria and urgency. Musculoskeletal:        Swelling, erythema, pain on right bunion and dorsal aspect of the foot. Skin: Negative for rash and wound. Neurological: Negative for dizziness. All other systems reviewed and are negative.      --------------------------------------------- PAST HISTORY ---------------------------------------------  Past Medical History:  has a past medical history of Abnormal stress test, [facility] Chronic back pain, Hyperlipidemia, Hypertension, and PAF (paroxysmal atrial fibrillation) ([facility] Past Surgical History:  has a past surgical history that includes Small intestine surgery; lymph node biopsy; Spine surgery; Colonoscopy; and Rotator cuff repair. Social History:  reports that he has quit smoking. His smoking use included cigars. He started smoking about 40 years ago. He has quit using smokeless tobacco. He reports current alcohol use. He reports that he does not use drugs. Family History: family history includes Heart Disease in his sister; Stroke in his father. . Unless otherwise noted, family history is non contributory    The patients home medications have been reviewed. Allergies: Patient has no known allergies. ---------------------------------------------------PHYSICAL EXAM--------------------------------------  Physical Exam  Constitutional:       Appearance: Normal appearance. HENT:      Head: Normocephalic and atraumatic. Nose: Nose normal. No congestion or rhinorrhea. Mouth/Throat:      Mouth: Mucous membranes are moist.      Pharynx: Oropharynx is clear. Eyes:      Extraocular Movements: Extraocular movements intact. Pupils: Pupils are equal, round, and reactive to light. Neck:      Musculoskeletal: Normal range of motion and neck supple. Cardiovascular:      Rate and Rhythm: Normal rate and regular rhythm. Pulses: Normal pulses. Heart sounds: Normal heart sounds. No murmur. Pulmonary:      Effort: No respiratory distress. Breath sounds: Normal breath sounds. No wheezing. Musculoskeletal: Normal range of motion. Feet:    Feet:      Comments: Full range of motion of the first right toe. Skin:     General: Skin is warm and dry. Capillary Refill: Capillary refill takes less than 2 seconds. Neurological:      General: No focal deficit present. Mental Status: He is alert and oriented to person, place, and time. Mental status is at baseline.       ------------------------------------------------- RESULTS -------------------------------------------------  I have personally reviewed all laboratory and imaging results for this patient. Results are listed below. LABS: (Lab results interpreted by me)  No results found for this visit on 07/09/20.,       RADIOLOGY:  Interpreted by Radiologist unless otherwise specified  No orders to display     ------------------------- NURSING NOTES AND VITALS REVIEWED ---------------------------   The nursing notes within the ED encounter and vital signs as below have been reviewed by myself  BP (!) 150/100   Pulse 63   Temp 98.8 °F (37.1 °C) (Oral)   Resp 16   Ht 5' 4\" (1.626 m)   Wt 180 lb (81.6 kg)   SpO2 97%   BMI 30.90 kg/m²     The patients available past medical records and past encounters were reviewed.         ------------------------------ ED COURSE/MEDICAL DECISION MAKING----------------------  Medications   HYDROcodone-acetaminophen (NORCO) 5-325 MG per tablet 1 tablet (1 tablet Oral Given 20 [id number])       Medical Decision Makin-year-old male with past medical history of gout on allopurinol who presented to the ED for 1 week of swelling, erythema and pain to the left first metatarsophalangeal joint. On physical exam there was erythema, tenderness to palpation, swelling of the area extending to the dorsal aspect of the foot, but there was full range of motion and there is no streaking or fluctuance noted. Likely a gout flareup with questionable superimposed cellulitis. No evidence of septic joint. Patient will be discharged home with 5 days of Keflex and pain management. Re-Evaluations:     Plan discussed with patient, patient agreeable. This patient's ED course included: a personal history and physicial examination    This patient has remained hemodynamically stable and remained unchanged during their ED course. Consultations:  None    Counseling: The emergency provider has spoken with the patient and discussed todays results, in addition to providing specific details for the plan of care and counseling regarding the diagnosis and prognosis. Questions are answered at this time and they are agreeable with the plan.       --------------------------------- IMPRESSION AND DISPOSITION ---------------------------------    IMPRESSION  1. Acute idiopathic gout involving toe of right foot        DISPOSITION  Disposition: Discharge to home  Patient condition is good        NOTE: This report was transcribed using voice recognition software.  Every effort was made to ensure accuracy; however, inadvertent computerized transcription errors may be present       Mo Mojica MD  Resident  [phone]       oM Mojica MD  Resident  [phone]       Trang Nicholson MD  [phone]

## 2020-07-10 ENCOUNTER — TELEPHONE (OUTPATIENT)
Dept: NEUROLOGY | Age: 65
End: 2020-07-10

## 2020-07-10 NOTE — TELEPHONE ENCOUNTER
----- Message from Rao Delarosa MD sent at 7/10/2020  8:45 AM EDT -----  Please inform the patient that his cholesterol has significantly improved compared to 4 months ago. He should continue with the current medications.

## 2020-07-10 NOTE — RESULT ENCOUNTER NOTE
Please inform the patient that his cholesterol has significantly improved compared to 4 months ago. He should continue with the current medications.

## 2020-07-10 NOTE — TELEPHONE ENCOUNTER
Spoke with patient and informed him that his cholesterol has significantly improved compared to 4 months ago. He should continue with the current medications.  Patient understood

## 2020-07-30 ENCOUNTER — OFFICE VISIT (OUTPATIENT)
Dept: PODIATRY | Age: 65
End: 2020-07-30
Payer: MEDICARE

## 2020-07-30 VITALS
WEIGHT: 178 LBS | DIASTOLIC BLOOD PRESSURE: 78 MMHG | SYSTOLIC BLOOD PRESSURE: 123 MMHG | BODY MASS INDEX: 30.55 KG/M2 | TEMPERATURE: 97.6 F

## 2020-07-30 PROCEDURE — 1036F TOBACCO NON-USER: CPT | Performed by: PODIATRIST

## 2020-07-30 PROCEDURE — G8427 DOCREV CUR MEDS BY ELIG CLIN: HCPCS | Performed by: PODIATRIST

## 2020-07-30 PROCEDURE — 99213 OFFICE O/P EST LOW 20 MIN: CPT | Performed by: PODIATRIST

## 2020-07-30 PROCEDURE — G8417 CALC BMI ABV UP PARAM F/U: HCPCS | Performed by: PODIATRIST

## 2020-07-30 PROCEDURE — 1123F ACP DISCUSS/DSCN MKR DOCD: CPT | Performed by: PODIATRIST

## 2020-07-30 PROCEDURE — 20600 DRAIN/INJ JOINT/BURSA W/O US: CPT | Performed by: PODIATRIST

## 2020-07-30 PROCEDURE — 3017F COLORECTAL CA SCREEN DOC REV: CPT | Performed by: PODIATRIST

## 2020-07-30 PROCEDURE — 4040F PNEUMOC VAC/ADMIN/RCVD: CPT | Performed by: PODIATRIST

## 2020-07-30 RX ORDER — METHYLPREDNISOLONE ACETATE 40 MG/ML
40 INJECTION, SUSPENSION INTRA-ARTICULAR; INTRALESIONAL; INTRAMUSCULAR; SOFT TISSUE ONCE
Status: COMPLETED | OUTPATIENT
Start: 2020-07-30 | End: 2020-07-30

## 2020-07-30 RX ORDER — BUPIVACAINE HYDROCHLORIDE 5 MG/ML
1 INJECTION, SOLUTION PERINEURAL ONCE
Status: COMPLETED | OUTPATIENT
Start: 2020-07-30 | End: 2020-07-30

## 2020-07-30 RX ORDER — COLCHICINE 0.6 MG/1
0.6 TABLET ORAL DAILY
Qty: 14 TABLET | Refills: 0 | Status: SHIPPED
Start: 2020-07-30 | End: 2020-09-08 | Stop reason: SDUPTHER

## 2020-07-30 RX ADMIN — BUPIVACAINE HYDROCHLORIDE 5 MG: 5 INJECTION, SOLUTION PERINEURAL at 15:13

## 2020-07-30 RX ADMIN — METHYLPREDNISOLONE ACETATE 40 MG: 40 INJECTION, SUSPENSION INTRA-ARTICULAR; INTRALESIONAL; INTRAMUSCULAR; SOFT TISSUE at 15:13

## 2020-07-30 NOTE — PROGRESS NOTES
20  Patria Tenorio : 1955 Sex: male  Age: 71 y.o. Patient was referred by Sara Kovacs DO    Chief Complaint   Patient presents with    Foot Pain     gout right foot finished the colcrys and Keflex foot still sore but better        SUBJECTIVE patient is seen for follow-up of gout to the right foot patient had blood work patient is actually feeling better today he is about 50% better still having stiffness and swelling in the right great toe the redness has reduced greatly. Patient had no side effects with Colcrys.   HPI  Review of Systems  Const: Denies constitutional symptoms  Musculo: Denies symptoms other than stated above  Skin: Denies symptoms other than stated above   WBC 6.2 4.5 - 11.5 E9/L Final 2020  1:40 PM [facility] RBC 4.96 3.80 - 5.80 E12/L Final 2020  1:40 PM [facility] Hemoglobin 14.7 12.5 - 16.5 g/dL Final 2020  1:40 PM [facility] Hematocrit 46.3 37.0 - 54.0 % Final 2020  1:40 PM [facility] MCV 93.3 80.0 - 99.9 fL Final 2020  1:40 PM  - [facility] 29.6 26.0 - 35.0 pg Final 2020  1:40 PM [facility] MCHC 31.7Low  32.0 - 34.5 % Final 2020  1:40 PM [facility] RDW 12.4 11.5 - 15.0 fL Final 2020  1:40 PM [facility] Platelets 063 343 - 354 E9/L Final 2020  1:40 PM [facility] MPV 10.7 7.0 - 12.0 fL Final 2020  1:40 PM [facility] Neutrophils % 52.9 43.0 - 80.0 % Final 2020  1:40 PM  - [facility] Immature Granulocytes % 0.2   Uric Acid, Serum 6.4 3.4 - 7.0 mg/dL       Current Outpatient Medications:     colchicine (COLCRYS) 0.6 MG tablet, Take 1 tablet by mouth daily for 14 days 1 tab po qd, Disp: 14 tablet, Rfl: 0    allopurinol (ZYLOPRIM) 100 MG tablet, TAKE 1 TABLET BY MOUTH EVERY DAY, Disp: , Rfl:     Blood Pressure KIT, Check BP  Once daily, Disp: 1 kit, Rfl: 0    atorvastatin (LIPITOR) 40 MG tablet, Take 1 tablet by mouth daily, Disp: 30 tablet, Rfl: 3    ibuprofen (IBU) 600 MG tablet, Take 1 tablet by mouth every 8 hours as needed for Pain Take with food. , Disp: 30 tablet, Rfl: 1    aspirin EC 81 MG EC tablet, Take 81 mg by mouth daily, Disp: , Rfl:     magnesium gluconate (MAGONATE) 500 MG tablet, Take 500 mg by mouth 2 times daily, Disp: , Rfl:     psyllium (KONSYL) 28.3 % PACK, Take 1 packet by mouth daily, Disp: , Rfl:     gemfibrozil (LOPID) 600 MG tablet, TAKE 1 TABLET BY MOUTH TWICE A DAY, Disp: , Rfl: 5    VASCEPA 1 G CAPS capsule, TAKE TWO CAPSULES TWO TIMES A DAY, Disp: , Rfl: 5    vitamin D (CHOLECALCIFEROL) 1000 UNIT TABS tablet, Take 4,000 Units by mouth daily , Disp: , Rfl:     Multiple Vitamins-Minerals (THERAPEUTIC MULTIVITAMIN-MINERALS) tablet, Take 1 tablet by mouth daily, Disp: , Rfl:     lisinopril-hydrochlorothiazide (PRINZIDE;ZESTORETIC) 10-12.5 MG per tablet, Take 1 tablet by mouth every other day , Disp: , Rfl:     fenofibrate (TRICOR) 145 MG tablet, Take 145 mg by mouth daily. , Disp: , Rfl:   No Known Allergies    Past Medical History:   Diagnosis Date    Abnormal stress test 4/5/2017    Cancer [facility])     Testicular    Chronic back pain     Hyperlipidemia     Hypertension     PAF ([facility]oxysmal [facility] fibrillation) ([facility] 4/5/2017     Past Surgical History:   Procedure Laterality Date    COLONOSCOPY      LYMPH NODE BIOPSY      ROTATOR CUFF REPAIR      SMALL INTESTINE SURGERY      SPINE SURGERY       Family History   Problem Relation Age of Onset    Stroke Father     Heart Disease Sister      Social History     Socioeconomic History    Marital status:      Spouse name: Not on file    Number of children: Not on file    Years of education: Not on file    Highest education level: Not on file   Occupational History Plan: Today we reviewed further treatment options the second injection was given today patient was placed back on Colcrys for 2 weeks. Allopurinol to his Colcrys. Right first metatarsal phalangeal joint injection:  Potential risks, complications, alternative treatment options and procedure prognosis were explained to the patient. Verbal and signed informed consent were obtained from the patient. An antiseptic preparation of the skin was performed with ChloraPrep 1 cc of 0.5% Marcaine plain 1 cc of Depo-Medrol into the first digit right foot no adverse reaction was observed. The puncture site was covered with an adhesive bandage and the patient was fitted with a removable metatarsal pad. Post injection instructions were given. Miguelangel Alvarez was seen today for foot pain. Diagnoses and all orders for this visit:    Acute gout involving toe of right foot, unspecified cause    Pain in right foot    Other orders  -     colchicine (COLCRYS) 0.6 MG tablet; Take 1 tablet by mouth daily for 14 days 1 tab po qd  -     methylPREDNISolone acetate (DEPO-MEDROL) injection 40 mg  -     bupivacaine (MARCAINE) 0.5 % injection 5 mg        Return in about 3 weeks (around 8/20/2020). Seen By:  Halima Albrecht DPM      Document was created using voice recognition software. Note was reviewed, however may contain grammatical errors.

## 2020-09-08 ENCOUNTER — OFFICE VISIT (OUTPATIENT)
Dept: PODIATRY | Age: 65
End: 2020-09-08
Payer: MEDICARE

## 2020-09-08 VITALS
HEIGHT: 65 IN | SYSTOLIC BLOOD PRESSURE: 130 MMHG | DIASTOLIC BLOOD PRESSURE: 80 MMHG | WEIGHT: 176 LBS | TEMPERATURE: 97.6 F | BODY MASS INDEX: 29.32 KG/M2

## 2020-09-08 PROCEDURE — 3017F COLORECTAL CA SCREEN DOC REV: CPT | Performed by: PODIATRIST

## 2020-09-08 PROCEDURE — 4040F PNEUMOC VAC/ADMIN/RCVD: CPT | Performed by: PODIATRIST

## 2020-09-08 PROCEDURE — 1123F ACP DISCUSS/DSCN MKR DOCD: CPT | Performed by: PODIATRIST

## 2020-09-08 PROCEDURE — 99213 OFFICE O/P EST LOW 20 MIN: CPT | Performed by: PODIATRIST

## 2020-09-08 PROCEDURE — 20605 DRAIN/INJ JOINT/BURSA W/O US: CPT | Performed by: PODIATRIST

## 2020-09-08 PROCEDURE — G8417 CALC BMI ABV UP PARAM F/U: HCPCS | Performed by: PODIATRIST

## 2020-09-08 PROCEDURE — 1036F TOBACCO NON-USER: CPT | Performed by: PODIATRIST

## 2020-09-08 PROCEDURE — G8427 DOCREV CUR MEDS BY ELIG CLIN: HCPCS | Performed by: PODIATRIST

## 2020-09-08 RX ORDER — BUPIVACAINE HYDROCHLORIDE 5 MG/ML
1 INJECTION, SOLUTION PERINEURAL ONCE
Status: COMPLETED | OUTPATIENT
Start: 2020-09-08 | End: 2020-09-08

## 2020-09-08 RX ORDER — METHYLPREDNISOLONE ACETATE 40 MG/ML
40 INJECTION, SUSPENSION INTRA-ARTICULAR; INTRALESIONAL; INTRAMUSCULAR; SOFT TISSUE ONCE
Status: COMPLETED | OUTPATIENT
Start: 2020-09-08 | End: 2020-09-08

## 2020-09-08 RX ORDER — ETODOLAC 400 MG/1
400 TABLET, FILM COATED ORAL 2 TIMES DAILY
Qty: 120 TABLET | Refills: 0 | Status: SHIPPED | OUTPATIENT
Start: 2020-09-08 | End: 2020-11-07

## 2020-09-08 RX ORDER — COLCHICINE 0.6 MG/1
0.6 TABLET ORAL DAILY
Qty: 14 TABLET | Refills: 2 | Status: SHIPPED | OUTPATIENT
Start: 2020-09-08 | End: 2020-11-03

## 2020-09-08 RX ADMIN — METHYLPREDNISOLONE ACETATE 40 MG: 40 INJECTION, SUSPENSION INTRA-ARTICULAR; INTRALESIONAL; INTRAMUSCULAR; SOFT TISSUE at 09:08

## 2020-09-08 RX ADMIN — BUPIVACAINE HYDROCHLORIDE 5 MG: 5 INJECTION, SOLUTION PERINEURAL at 09:08

## 2020-09-08 NOTE — PROGRESS NOTES
20  Bruce Hernandez : 1955 Sex: male  Age: 76 y.o. Patient was referred by Dain Topete DO    Chief Complaint   Patient presents with    Foot Pain     f/u gouty attack right foot pt cxl'd and rescheduled several f/u appointments he is out of the [facility] [address] would like to get another refill not all the way gone        SUBJECTIVE patient is seen today for follow-up of a right great toe pain and a new issue right ankle pain. Patient was on vacation for 1 week doing a lot of walking. Patient was also doing at dinnertime a lot of seafood patient denies fever chills or night sweats. Patient does state that the last injection did not really help  HPI  Review of Systems  Const: Denies constitutional symptoms  Musculo: Denies symptoms other than stated above  Skin: Denies symptoms other than stated above       Current Outpatient Medications:     colchicine (COLCRYS) 0.6 MG tablet, Take 1 tablet by mouth daily for 14 days 1 tab po qd, Disp: 14 tablet, Rfl: 2    etodolac (LODINE) 400 MG tablet, Take 1 tablet by mouth 2 times daily, Disp: 120 tablet, Rfl: 0    allopurinol (ZYLOPRIM) 100 MG tablet, TAKE 1 TABLET BY MOUTH EVERY DAY, Disp: , Rfl:     Blood Pressure KIT, Check BP  Once daily, Disp: 1 kit, Rfl: 0    atorvastatin (LIPITOR) 40 MG tablet, Take 1 tablet by mouth daily, Disp: 30 tablet, Rfl: 3    ibuprofen (IBU) 600 MG tablet, Take 1 tablet by mouth every 8 hours as needed for Pain Take with food. , Disp: 30 tablet, Rfl: 1    aspirin EC 81 MG EC tablet, Take 81 mg by mouth daily, Disp: , Rfl:     magnesium gluconate (MAGONATE) 500 MG tablet, Take 500 mg by mouth 2 times daily, Disp: , Rfl:     psyllium (KONSYL) 28.3 % PACK, Take 1 packet by mouth daily, Disp: , Rfl:     gemfibrozil (LOPID) 600 MG tablet, TAKE 1 TABLET BY MOUTH TWICE A DAY, Disp: , Rfl: 5    VASCEPA 1 G CAPS capsule, TAKE TWO CAPSULES TWO TIMES A DAY, Disp: , Rfl: 5    vitamin D (CHOLECALCIFEROL) 1000 UNIT TABS tablet, Take 4,000 Units by mouth daily , Disp: , Rfl:     Multiple Vitamins-Minerals (THERAPEUTIC MULTIVITAMIN-MINERALS) tablet, Take 1 tablet by mouth daily, Disp: , Rfl:     lisinopril-hydrochlorothiazide (PRINZIDE;ZESTORETIC) 10-12.5 MG per tablet, Take 1 tablet by mouth every other day , Disp: , Rfl:     fenofibrate (TRICOR) 145 MG tablet, Take 145 mg by mouth daily. , Disp: , Rfl:   No Known Allergies    Past Medical History:   Diagnosis Date    Abnormal stress test 4/5/2017    Cancer [facility])     Testicular    Chronic back pain     Hyperlipidemia     Hypertension     PAF (paroxysmal atrial fibrillation) ([facility] 4/5/2017     Past Surgical History:   Procedure Laterality Date    COLONOSCOPY      LYMPH NODE BIOPSY      ROTATOR CUFF REPAIR      SMALL INTESTINE SURGERY      SPINE SURGERY       Family History   Problem Relation Age of Onset    Stroke Father     Heart Disease Sister      Social History     Socioeconomic History    Marital status:      Spouse name: Not on file    Number of children: Not on file    Years of education: Not on file    Highest education level: Not on file   Occupational History    Not on file   Social Needs    Financial resource strain: Not on file    Food insecurity     Worry: Not on file     Inability: Not on file    Transportation needs     Medical: Not on file     Non-medical: Not on file   Tobacco Use    Smoking status: Former Smoker     Types: Cigars     Start date: 1/1/1980    Smokeless tobacco: Former User   Substance and Sexual Activity    Alcohol use: Yes     Frequency: 2-3 times a week    Drug use: No    Sexual activity: Not on file   Lifestyle    Physical activity     Days per week: Not on file     Minutes per session: Not on file    Stress: Not on file   Relationships    Social connections     Talks on phone: Not on file     Gets together: Not on file     Attends Yazidi service: Not on file     Active member of club or organization: Not on file     Attends meetings of clubs or organizations: Not on file     Relationship status: Not on file    Intimate partner violence     Fear of current or ex partner: Not on file     Emotionally abused: Not on file     Physically abused: Not on file     Forced sexual activity: Not on file   Other Topics Concern    Not on file   Social History Narrative    Not on file       Vitals:    09/08/20 0840   BP: 130/80   Temp: 97.6 °F (36.4 °C)   TempSrc: Temporal   Weight: 176 lb (79.8 kg)   Height: 5' 5\" (1.651 m)       Focused Lower Extremity Physical Exam:  Vitals:    09/08/20 0840   BP: 130/80   Temp: 97.6 °F (36.4 °C)        Foot Exam    Right Foot/Ankle     Range of Motion    Normal right ankle ROM             Right Ankle Exam     Tenderness   The patient is experiencing tenderness in the ATF and lateral malleolus. Swelling: none    Range of Motion   The patient has normal right ankle ROM. Muscle Strength   The patient has normal right ankle strength. Tests   Anterior drawer: negative  Varus tilt: negative            Vascular: pulses  dp  pt  palapble  Capillary Refill Time:   Hair growth  Skin:    Edema:    Neurologic:      Musculoskeletal/ Orthopedic examination: Orthopedic examination revealed a large dorsal and medial exostosis first metatarsal phalangeal joint right foot. There was crepitus with dorsiflexion and plantar flexion. Minimal pain with palpation dorsiflexion plantarflexion first metatarsal phalangeal joint right foot. The right ankle there was pain with palpation along the anterior talar fib ligament. NAIL   Web space   Derm:          Assessment and Plan: Emanation of the both issues #1 the right hallux rigidus and gout I did prescribe Lodine 1 pill twice a day Colcrys as needed. The right ankle was evaluated today today I gave an injection into the right ankle icing the Lodine 1 pill twice a day. Patient was also sent over to Ortho for bilateral knee pain.   Remi Saint was seen today for foot pain. Diagnoses and all orders for this visit:    Pain and swelling of right ankle    Acute gout involving toe of right foot, unspecified cause    Pain in right foot    Chronic pain of both knees  -     External Referral To Orthopedic Surgery    Other orders  -     colchicine (COLCRYS) 0.6 MG tablet; Take 1 tablet by mouth daily for 14 days 1 tab po qd  -     etodolac (LODINE) 400 MG tablet; Take 1 tablet by mouth 2 times daily  -     methylPREDNISolone acetate (DEPO-MEDROL) injection 40 mg  -     bupivacaine (MARCAINE) 0.5 % injection 5 mg        Return in about 1 month (around 10/8/2020). Seen By:  Abdulaziz Chaney DPM      Document was created using voice recognition software. Note was reviewed, however may contain grammatical errors.

## 2020-11-03 ENCOUNTER — OFFICE VISIT (OUTPATIENT)
Dept: PODIATRY | Age: 65
End: 2020-11-03
Payer: MEDICARE

## 2020-11-03 VITALS
WEIGHT: 183 LBS | DIASTOLIC BLOOD PRESSURE: 70 MMHG | BODY MASS INDEX: 30.45 KG/M2 | TEMPERATURE: 97.5 F | SYSTOLIC BLOOD PRESSURE: 112 MMHG

## 2020-11-03 PROCEDURE — 4040F PNEUMOC VAC/ADMIN/RCVD: CPT | Performed by: PODIATRIST

## 2020-11-03 PROCEDURE — 1036F TOBACCO NON-USER: CPT | Performed by: PODIATRIST

## 2020-11-03 PROCEDURE — 1123F ACP DISCUSS/DSCN MKR DOCD: CPT | Performed by: PODIATRIST

## 2020-11-03 PROCEDURE — G8417 CALC BMI ABV UP PARAM F/U: HCPCS | Performed by: PODIATRIST

## 2020-11-03 PROCEDURE — 3017F COLORECTAL CA SCREEN DOC REV: CPT | Performed by: PODIATRIST

## 2020-11-03 PROCEDURE — G8427 DOCREV CUR MEDS BY ELIG CLIN: HCPCS | Performed by: PODIATRIST

## 2020-11-03 PROCEDURE — G8484 FLU IMMUNIZE NO ADMIN: HCPCS | Performed by: PODIATRIST

## 2020-11-03 PROCEDURE — 99213 OFFICE O/P EST LOW 20 MIN: CPT | Performed by: PODIATRIST

## 2020-11-03 NOTE — PROGRESS NOTES
11/3/20  Kori Preciado : 1955 Sex: male  Age: 76 y.o. Patient was referred by Lana Cabrera DO    Chief Complaint   Patient presents with    Foot Pain     right gouty attack foot pain went back on Allopurinol stopped the colcrys for now        SUBJECTIVE patient is seen for follow-up of gout attack to the right foot today patient is asymptomatic he is taking allopurinol. HPI  Review of Systems  Const: Denies constitutional symptoms  Musculo: Denies symptoms other than stated above  Skin: Denies symptoms other than stated above       Current Outpatient Medications:     etodolac (LODINE) 400 MG tablet, Take 1 tablet by mouth 2 times daily, Disp: 120 tablet, Rfl: 0    allopurinol (ZYLOPRIM) 100 MG tablet, TAKE 1 TABLET BY MOUTH EVERY DAY, Disp: , Rfl:     Blood Pressure KIT, Check BP  Once daily, Disp: 1 kit, Rfl: 0    atorvastatin (LIPITOR) 40 MG tablet, Take 1 tablet by mouth daily, Disp: 30 tablet, Rfl: 3    ibuprofen (IBU) 600 MG tablet, Take 1 tablet by mouth every 8 hours as needed for Pain Take with food. , Disp: 30 tablet, Rfl: 1    aspirin EC 81 MG EC tablet, Take 81 mg by mouth daily, Disp: , Rfl:     magnesium gluconate (MAGONATE) 500 MG tablet, Take 500 mg by mouth 2 times daily, Disp: , Rfl:     psyllium (KONSYL) 28.3 % PACK, Take 1 packet by mouth daily, Disp: , Rfl:     gemfibrozil (LOPID) 600 MG tablet, TAKE 1 TABLET BY MOUTH TWICE A DAY, Disp: , Rfl: 5    VASCEPA 1 G CAPS capsule, TAKE TWO CAPSULES TWO TIMES A DAY, Disp: , Rfl: 5    vitamin D (CHOLECALCIFEROL) 1000 UNIT TABS tablet, Take 4,000 Units by mouth daily , Disp: , Rfl:     Multiple Vitamins-Minerals (THERAPEUTIC MULTIVITAMIN-MINERALS) tablet, Take 1 tablet by mouth daily, Disp: , Rfl:     lisinopril-hydrochlorothiazide (PRINZIDE;ZESTORETIC) 10-12.5 MG per tablet, Take 1 tablet by mouth every other day , Disp: , Rfl:     fenofibrate (TRICOR) 145 MG tablet, Take 145 mg by mouth daily. , Disp: , Rfl:    colchicine (COLCRYS) 0.6 MG tablet, Take 1 tablet by mouth daily for 14 days 1 tab po qd (Patient not taking: Reported on 11/3/2020), Disp: 14 tablet, Rfl: 2  No Known Allergies    Past Medical History:   Diagnosis Date    Abnormal stress test 4/5/2017    Cancer (HCC)     Testicular    Chronic back pain     Hyperlipidemia     Hypertension     PAF (paroxysmal atrial fibrillation) ([facility] 4/5/2017     Past Surgical History:   Procedure Laterality Date    COLONOSCOPY      LYMPH NODE BIOPSY      ROTATOR CUFF REPAIR      SMALL INTESTINE SURGERY      SPINE SURGERY       Family History   Problem Relation Age of Onset    Stroke Father     Heart Disease Sister      Social History     Socioeconomic History    Marital status:      Spouse name: Not on file    Number of children: Not on file    Years of education: Not on file    Highest education level: Not on file   Occupational History    Not on file   Social Needs    Financial resource strain: Not on file    Food insecurity     Worry: Not on file     Inability: Not on file    Transportation needs     Medical: Not on file     Non-medical: Not on file   Tobacco Use    Smoking status: Former Smoker     Types: Cigars     Start date: 1/1/1980    Smokeless tobacco: Former User   Substance and Sexual Activity    Alcohol use: Yes     Frequency: 2-3 times a week    Drug use: No    Sexual activity: Not on file   Lifestyle    Physical activity     Days per week: Not on file     Minutes per session: Not on file    Stress: Not on file   Relationships    Social connections     Talks on phone: Not on file     Gets together: Not on file     Attends [employer] service: Not on file     Active member of club or organization: Not on file     Attends meetings of clubs or organizations: Not on file     Relationship status: Not on file    Intimate partner violence     Fear of current or ex partner: Not on file     Emotionally abused: Not on file     Physically abused: Not on file     Forced sexual activity: Not on file   Other Topics Concern    Not on file   Social History Narrative    Not on file       Vitals:    11/03/20 0829   BP: 112/70   Temp: 97.5 °F (36.4 °C)   TempSrc: Temporal   Weight: 183 lb (83 kg)       Focused Lower Extremity Physical Exam:  Vitals:    11/03/20 0829   BP: 112/70   Temp: 97.5 °F (36.4 °C)        Foot Exam    General  General Appearance: appears stated age and healthy   Orientation: alert and oriented to person, place, and time       Right Foot/Ankle     Inspection and Palpation  Tenderness: none   Swelling: none   Hallux valgus: yes  Hallux limitus: yes  Skin Exam: skin intact; Neurovascular  Dorsalis pedis: 3+  Posterior tibial: 3+  Saphenous nerve sensation: normal  Tibial nerve sensation: normal  Superficial peroneal nerve sensation: normal  Deep peroneal nerve sensation: normal  Sural nerve sensation: normal             Ortho Exam    Vascular: pulses  dp  pt    Capillary Refill Time:   Hair growth  Skin:    Edema:    Neurologic:      Musculoskeletal/ Orthopedic examination: Examination of the right first metatarsophalangeal joint reduced pain there is no erythematous noted. Large dorsal medial exostosis is noted. NAIL   Web space   Derm:          Assessment and Plan: Today will get a continue monitoring with allopurinol patient will reappoint as needed for bunion. I did put a referral in for Ortho for left knee pain. Monika Falcon was seen today for foot pain. Diagnoses and all orders for this visit:    Acute gout involving toe of right foot, unspecified cause    Pain in right foot    Pain and swelling of right ankle    Chronic pain of both knees        No follow-ups on file. Seen By:  Anna Malone DPM      Document was created using voice recognition software. Note was reviewed, however may contain grammatical errors.

## 2021-01-08 DIAGNOSIS — G89.29 CHRONIC PAIN OF BOTH KNEES: Primary | ICD-10-CM

## 2021-01-08 DIAGNOSIS — M25.561 CHRONIC PAIN OF BOTH KNEES: Primary | ICD-10-CM

## 2021-01-08 DIAGNOSIS — M25.562 CHRONIC PAIN OF BOTH KNEES: Primary | ICD-10-CM

## 2021-01-12 ENCOUNTER — OFFICE VISIT (OUTPATIENT)
Dept: ORTHOPEDIC SURGERY | Age: 66
End: 2021-01-12
Payer: MEDICARE

## 2021-01-12 VITALS
WEIGHT: 183 LBS | RESPIRATION RATE: 16 BRPM | SYSTOLIC BLOOD PRESSURE: 126 MMHG | HEIGHT: 65 IN | BODY MASS INDEX: 30.49 KG/M2 | TEMPERATURE: 97.9 F | DIASTOLIC BLOOD PRESSURE: 91 MMHG | HEART RATE: 73 BPM

## 2021-01-12 DIAGNOSIS — M17.12 PRIMARY OSTEOARTHRITIS OF LEFT KNEE: ICD-10-CM

## 2021-01-12 PROCEDURE — 4040F PNEUMOC VAC/ADMIN/RCVD: CPT | Performed by: PHYSICIAN ASSISTANT

## 2021-01-12 PROCEDURE — 3017F COLORECTAL CA SCREEN DOC REV: CPT | Performed by: PHYSICIAN ASSISTANT

## 2021-01-12 PROCEDURE — G8427 DOCREV CUR MEDS BY ELIG CLIN: HCPCS | Performed by: PHYSICIAN ASSISTANT

## 2021-01-12 PROCEDURE — 1036F TOBACCO NON-USER: CPT | Performed by: PHYSICIAN ASSISTANT

## 2021-01-12 PROCEDURE — G8417 CALC BMI ABV UP PARAM F/U: HCPCS | Performed by: PHYSICIAN ASSISTANT

## 2021-01-12 PROCEDURE — G8484 FLU IMMUNIZE NO ADMIN: HCPCS | Performed by: PHYSICIAN ASSISTANT

## 2021-01-12 PROCEDURE — 1123F ACP DISCUSS/DSCN MKR DOCD: CPT | Performed by: PHYSICIAN ASSISTANT

## 2021-01-12 PROCEDURE — 99203 OFFICE O/P NEW LOW 30 MIN: CPT | Performed by: PHYSICIAN ASSISTANT

## 2021-01-12 NOTE — PROGRESS NOTES
New Patient Orthopaedic Progress Note    Chepe Medina is a 76 y.o. male, his YOB: 1955 with the following history as recorded in [facility]:      Patient Active Problem List    Diagnosis Date Noted    Primary osteoarthritis of left knee 01/12/2021    Chest pain 02/22/2020    Essential hypertension 02/22/2020    Hyperlipidemia LDL goal <100 02/22/2020    Paroxysmal atrial fibrillation ([address] 02/22/2020     Current Outpatient Medications   Medication Sig Dispense Refill    allopurinol (ZYLOPRIM) 100 MG tablet TAKE 1 TABLET BY MOUTH EVERY DAY      Blood Pressure KIT Check BP  Once daily 1 kit 0    atorvastatin (LIPITOR) 40 MG tablet Take 1 tablet by mouth daily 30 tablet 3    ibuprofen (IBU) 600 MG tablet Take 1 tablet by mouth every 8 hours as needed for Pain Take with food. 30 tablet 1    aspirin EC 81 MG EC tablet Take 81 mg by mouth daily      magnesium gluconate (MAGONATE) 500 MG tablet Take 500 mg by mouth 2 times daily      psyllium (KONSYL) 28.3 % PACK Take 1 packet by mouth daily      gemfibrozil (LOPID) 600 MG tablet TAKE 1 TABLET BY MOUTH TWICE A DAY  5    VASCEPA 1 G CAPS capsule TAKE TWO CAPSULES TWO TIMES A DAY  5    vitamin D (CHOLECALCIFEROL) 1000 UNIT TABS tablet Take 4,000 Units by mouth daily       Multiple Vitamins-Minerals (THERAPEUTIC MULTIVITAMIN-MINERALS) tablet Take 1 tablet by mouth daily      lisinopril-hydrochlorothiazide (PRINZIDE;ZESTORETIC) 10-12.5 MG per tablet Take 1 tablet by mouth every other day       fenofibrate (TRICOR) 145 MG tablet Take 145 mg by mouth daily.  colchicine (COLCRYS) 0.6 MG tablet Take 1 tablet by mouth daily for 14 days 1 tab po qd (Patient not taking: Reported on 11/3/2020) 14 tablet 2    etodolac (LODINE) 400 MG tablet Take 1 tablet by mouth 2 times daily 120 tablet 0     No current facility-administered medications for this visit. Allergies: Patient has no known allergies.   Past Medical History:   Diagnosis Date fatigue. HENT: Negative for dental issues, hearing loss and tinnitus. Negative for congestion, sinus pressure, sneezing, sore throat. Negative for headache. Eyes: Negative for visual disturbance, blurred and double vision. Negative for pain, discharge, redness and itching  Respiratory: Negative for cough, shortness of breath and wheezing. Cardiovascular: Negative for chest pain, palpitations and leg swelling. No dyspnea on exertion   Gastrointestinal:   Negative for nausea, vomiting, abdominal pain, diarrhea, constipation  or black or bloody. Hematologic\Lymphatic:  negative for bleeding, petechiae,   Genitourinary: Negative for hematuria and difficulty urinating. Musculoskeletal: Negative for neck pain and stiffness. Negative for back pain, see HPI  Skin: Negative for pallor, rash and wound. Neurological: Negative for dizziness, tremors, seizures, weakness, light-headedness, no TIA or stroke symptoms. No numbness and headaches. Psychiatric/Behavioral: Negative. OBJECTIVE:      Physical Examination:   General appearance: alert, well appearing, and in no distress,  normal appearing weight. No visible signs of trauma   Mental status: alert, oriented to person, place, and time, normal mood, behavior, speech, dress, motor activity, and thought processes  Abdomen: soft, nondistended  Resp:   resp easy and unlabored, no audible wheezes note, normal symmetrical expansion of both hemithoraces  Cardiac: distal pulses palpable, skin and extremities well perfused  Neurological: alert, oriented X3, normal speech, no focal findings or movement disorder noted, motor and sensory grossly normal bilaterally, normal muscle tone, no tremors, strength 5/5, normal gait and station  HEENT: normochephalic atraumatic, external ears and eyes normal, sclera normal, neck supple, no nasal discharge.    Extremities:   peripheral pulses normal, no edema, redness or tenderness in the calves   Skin: normal coloration, no rashes PRN    Call the office if pain worsens and would like to pursue injections. Follow up PRN    Electronically signed by Paul Rodriguez PA-C on 1/12/2021 at 10:23 AM  Note: This report was completed using Remote voiced recognition software. Every effort has been made to ensure accuracy; however, inadvertent computerized transcription errors may be present.

## 2021-01-25 DIAGNOSIS — I63.81 LACUNAR STROKE (HCC): ICD-10-CM

## 2021-01-25 RX ORDER — ATORVASTATIN CALCIUM 40 MG/1
TABLET, FILM COATED ORAL
Qty: 90 TABLET | Refills: 1 | Status: SHIPPED | OUTPATIENT
Start: 2021-01-25

## 2021-02-27 ENCOUNTER — IMMUNIZATION (OUTPATIENT)
Dept: PRIMARY CARE CLINIC | Age: 66
End: 2021-02-27
Payer: MEDICARE

## 2021-02-27 PROCEDURE — 91300 COVID-19, PFIZER VACCINE 30MCG/0.3ML DOSE: CPT | Performed by: EMERGENCY MEDICINE

## 2021-02-27 PROCEDURE — 0001A COVID-19, PFIZER VACCINE 30MCG/0.3ML DOSE: CPT | Performed by: EMERGENCY MEDICINE

## 2021-03-23 ENCOUNTER — IMMUNIZATION (OUTPATIENT)
Dept: PRIMARY CARE CLINIC | Age: 66
End: 2021-03-23
Payer: MEDICARE

## 2021-03-23 PROCEDURE — 0002A COVID-19, PFIZER VACCINE 30MCG/0.3ML DOSE: CPT | Performed by: PHYSICIAN ASSISTANT

## 2021-03-23 PROCEDURE — 91300 COVID-19, PFIZER VACCINE 30MCG/0.3ML DOSE: CPT | Performed by: PHYSICIAN ASSISTANT

## 2021-08-30 ENCOUNTER — HOSPITAL ENCOUNTER (INPATIENT)
Age: 66
LOS: 5 days | Discharge: HOME | DRG: 177 | End: 2021-09-04
Attending: EMERGENCY MEDICINE | Admitting: INTERNAL MEDICINE
Payer: MEDICARE

## 2021-08-30 ENCOUNTER — APPOINTMENT (OUTPATIENT)
Dept: GENERAL RADIOLOGY | Age: 66
DRG: 177 | End: 2021-08-30
Payer: MEDICARE

## 2021-08-30 ENCOUNTER — APPOINTMENT (OUTPATIENT)
Dept: CT IMAGING | Age: 66
DRG: 177 | End: 2021-08-30
Payer: MEDICARE

## 2021-08-30 DIAGNOSIS — J96.01 ACUTE RESPIRATORY FAILURE WITH HYPOXIA (HCC): ICD-10-CM

## 2021-08-30 DIAGNOSIS — U07.1 COVID-19: Primary | ICD-10-CM

## 2021-08-30 LAB
ALBUMIN SERPL-MCNC: 4.1 G/DL (ref 3.5–5.2)
ALP BLD-CCNC: 162 U/L (ref 40–129)
ALT SERPL-CCNC: 70 U/L (ref 0–40)
ANION GAP SERPL CALCULATED.3IONS-SCNC: 12 MMOL/L (ref 7–16)
APTT: 28.9 SEC (ref 24.5–35.1)
AST SERPL-CCNC: 73 U/L (ref 0–39)
BASOPHILS ABSOLUTE: 0.01 E9/L (ref 0–0.2)
BASOPHILS RELATIVE PERCENT: 0.1 % (ref 0–2)
BILIRUB SERPL-MCNC: 0.8 MG/DL (ref 0–1.2)
BUN BLDV-MCNC: 17 MG/DL (ref 6–23)
CALCIUM SERPL-MCNC: 9.3 MG/DL (ref 8.6–10.2)
CHLORIDE BLD-SCNC: 98 MMOL/L (ref 98–107)
CO2: 26 MMOL/L (ref 22–29)
CREAT SERPL-MCNC: 0.9 MG/DL (ref 0.7–1.2)
D DIMER: 1003 NG/ML DDU
EKG ATRIAL RATE: 114 BPM
EKG P AXIS: 17 DEGREES
EKG P-R INTERVAL: 150 MS
EKG Q-T INTERVAL: 302 MS
EKG QRS DURATION: 76 MS
EKG QTC CALCULATION (BAZETT): 416 MS
EKG R AXIS: -23 DEGREES
EKG T AXIS: 34 DEGREES
EKG VENTRICULAR RATE: 114 BPM
EOSINOPHILS ABSOLUTE: 0 E9/L (ref 0.05–0.5)
EOSINOPHILS RELATIVE PERCENT: 0 % (ref 0–6)
FIBRINOGEN: >700 MG/DL (ref 225–540)
GFR AFRICAN AMERICAN: >60
GFR NON-AFRICAN AMERICAN: >60 ML/MIN/1.73
GLUCOSE BLD-MCNC: 103 MG/DL (ref 74–99)
HCT VFR BLD CALC: 49.7 % (ref 37–54)
HEMOGLOBIN: 16.6 G/DL (ref 12.5–16.5)
IMMATURE GRANULOCYTES #: 0.11 E9/L
IMMATURE GRANULOCYTES %: 1 % (ref 0–5)
INR BLD: 1.2
LACTATE DEHYDROGENASE: 217 U/L (ref 135–225)
LACTIC ACID: 1.1 MMOL/L (ref 0.5–2.2)
LYMPHOCYTES ABSOLUTE: 1.34 E9/L (ref 1.5–4)
LYMPHOCYTES RELATIVE PERCENT: 12.6 % (ref 20–42)
MCH RBC QN AUTO: 29.6 PG (ref 26–35)
MCHC RBC AUTO-ENTMCNC: 33.4 % (ref 32–34.5)
MCV RBC AUTO: 88.8 FL (ref 80–99.9)
MONOCYTES ABSOLUTE: 0.86 E9/L (ref 0.1–0.95)
MONOCYTES RELATIVE PERCENT: 8.1 % (ref 2–12)
NEUTROPHILS ABSOLUTE: 8.35 E9/L (ref 1.8–7.3)
NEUTROPHILS RELATIVE PERCENT: 78.2 % (ref 43–80)
PDW BLD-RTO: 12.6 FL (ref 11.5–15)
PLATELET # BLD: 263 E9/L (ref 130–450)
PMV BLD AUTO: 10.2 FL (ref 7–12)
POTASSIUM REFLEX MAGNESIUM: 3.6 MMOL/L (ref 3.5–5)
PROTHROMBIN TIME: 13.1 SEC (ref 9.3–12.4)
RBC # BLD: 5.6 E12/L (ref 3.8–5.8)
SODIUM BLD-SCNC: 136 MMOL/L (ref 132–146)
TOTAL PROTEIN: 7.7 G/DL (ref 6.4–8.3)
TROPONIN, HIGH SENSITIVITY: 10 NG/L (ref 0–11)
WBC # BLD: 10.7 E9/L (ref 4.5–11.5)

## 2021-08-30 PROCEDURE — 86140 C-REACTIVE PROTEIN: CPT

## 2021-08-30 PROCEDURE — 2060000000 HC ICU INTERMEDIATE R&B

## 2021-08-30 PROCEDURE — 83605 ASSAY OF LACTIC ACID: CPT

## 2021-08-30 PROCEDURE — 96374 THER/PROPH/DIAG INJ IV PUSH: CPT

## 2021-08-30 PROCEDURE — 85610 PROTHROMBIN TIME: CPT

## 2021-08-30 PROCEDURE — 93005 ELECTROCARDIOGRAM TRACING: CPT | Performed by: PHYSICIAN ASSISTANT

## 2021-08-30 PROCEDURE — 85025 COMPLETE CBC W/AUTO DIFF WBC: CPT

## 2021-08-30 PROCEDURE — 80053 COMPREHEN METABOLIC PANEL: CPT

## 2021-08-30 PROCEDURE — 85378 FIBRIN DEGRADE SEMIQUANT: CPT

## 2021-08-30 PROCEDURE — 99283 EMERGENCY DEPT VISIT LOW MDM: CPT

## 2021-08-30 PROCEDURE — 85730 THROMBOPLASTIN TIME PARTIAL: CPT

## 2021-08-30 PROCEDURE — 6360000004 HC RX CONTRAST MEDICATION: Performed by: RADIOLOGY

## 2021-08-30 PROCEDURE — 71045 X-RAY EXAM CHEST 1 VIEW: CPT

## 2021-08-30 PROCEDURE — 6370000000 HC RX 637 (ALT 250 FOR IP): Performed by: STUDENT IN AN ORGANIZED HEALTH CARE EDUCATION/TRAINING PROGRAM

## 2021-08-30 PROCEDURE — 2580000003 HC RX 258: Performed by: STUDENT IN AN ORGANIZED HEALTH CARE EDUCATION/TRAINING PROGRAM

## 2021-08-30 PROCEDURE — 84484 ASSAY OF TROPONIN QUANT: CPT

## 2021-08-30 PROCEDURE — 71275 CT ANGIOGRAPHY CHEST: CPT

## 2021-08-30 PROCEDURE — 6360000002 HC RX W HCPCS: Performed by: STUDENT IN AN ORGANIZED HEALTH CARE EDUCATION/TRAINING PROGRAM

## 2021-08-30 PROCEDURE — 85384 FIBRINOGEN ACTIVITY: CPT

## 2021-08-30 PROCEDURE — 96361 HYDRATE IV INFUSION ADD-ON: CPT

## 2021-08-30 PROCEDURE — 83615 LACTATE (LD) (LDH) ENZYME: CPT

## 2021-08-30 PROCEDURE — 84145 PROCALCITONIN (PCT): CPT

## 2021-08-30 PROCEDURE — 6370000000 HC RX 637 (ALT 250 FOR IP): Performed by: PHYSICIAN ASSISTANT

## 2021-08-30 PROCEDURE — 82728 ASSAY OF FERRITIN: CPT

## 2021-08-30 PROCEDURE — 93010 ELECTROCARDIOGRAM REPORT: CPT | Performed by: INTERNAL MEDICINE

## 2021-08-30 PROCEDURE — 99223 1ST HOSP IP/OBS HIGH 75: CPT | Performed by: INTERNAL MEDICINE

## 2021-08-30 RX ORDER — 0.9 % SODIUM CHLORIDE 0.9 %
1000 INTRAVENOUS SOLUTION INTRAVENOUS ONCE
Status: COMPLETED | OUTPATIENT
Start: 2021-08-30 | End: 2021-08-30

## 2021-08-30 RX ORDER — ACETAMINOPHEN 650 MG/1
650 SUPPOSITORY RECTAL EVERY 6 HOURS PRN
Status: DISCONTINUED | OUTPATIENT
Start: 2021-08-30 | End: 2021-09-04 | Stop reason: HOSPADM

## 2021-08-30 RX ORDER — ACETAMINOPHEN 325 MG/1
650 TABLET ORAL EVERY 6 HOURS PRN
Status: DISCONTINUED | OUTPATIENT
Start: 2021-08-30 | End: 2021-09-04 | Stop reason: HOSPADM

## 2021-08-30 RX ORDER — DEXAMETHASONE SODIUM PHOSPHATE 10 MG/ML
10 INJECTION, SOLUTION INTRAMUSCULAR; INTRAVENOUS ONCE
Status: COMPLETED | OUTPATIENT
Start: 2021-08-30 | End: 2021-08-30

## 2021-08-30 RX ORDER — DEXAMETHASONE SODIUM PHOSPHATE 10 MG/ML
6 INJECTION, SOLUTION INTRAMUSCULAR; INTRAVENOUS ONCE
Status: DISCONTINUED | OUTPATIENT
Start: 2021-08-30 | End: 2021-08-30

## 2021-08-30 RX ORDER — BENZONATATE 100 MG/1
100 CAPSULE ORAL ONCE
Status: COMPLETED | OUTPATIENT
Start: 2021-08-30 | End: 2021-08-30

## 2021-08-30 RX ADMIN — SODIUM CHLORIDE 1000 ML: 9 INJECTION, SOLUTION INTRAVENOUS at 20:45

## 2021-08-30 RX ADMIN — DEXAMETHASONE SODIUM PHOSPHATE 10 MG: 10 INJECTION, SOLUTION INTRAMUSCULAR; INTRAVENOUS at 20:47

## 2021-08-30 RX ADMIN — IOPAMIDOL 75 ML: 755 INJECTION, SOLUTION INTRAVENOUS at 22:03

## 2021-08-30 RX ADMIN — BENZONATATE 100 MG: 100 CAPSULE ORAL at 20:47

## 2021-08-30 RX ADMIN — ACETAMINOPHEN 650 MG: 325 TABLET ORAL at 20:47

## 2021-08-30 ASSESSMENT — ENCOUNTER SYMPTOMS
EYE DISCHARGE: 0
NAUSEA: 1
SINUS PRESSURE: 0
CHEST TIGHTNESS: 1
SORE THROAT: 1
DIARRHEA: 1
EYE REDNESS: 0
COUGH: 1
ABDOMINAL PAIN: 0
SHORTNESS OF BREATH: 1
WHEEZING: 0
VOMITING: 0
BACK PAIN: 0
EYE PAIN: 0

## 2021-08-30 ASSESSMENT — PAIN SCALES - GENERAL: PAINLEVEL_OUTOF10: 0

## 2021-08-30 NOTE — ED PROVIDER NOTES
Patient is a 65-year-old male presenting to emergency department for COVID-19. Is diagnosed on 823, was ambulated in our waiting room and became hypoxic to 88%. He was having issues with shortness of breath, fever, cough, diarrhea, chest tightness for the last few days, symptoms are gradual onset, been persistent, ambulation makes her shortness of breath worse, rest makes it better, symptoms are moderate severity. Patient has been vaccinated, he received this vaccine in February and March of last year. It was Vaximm vaccine. Review of Systems   Constitutional: Positive for chills, fatigue and fever. HENT: Positive for congestion and sore throat. Negative for ear pain and sinus pressure. Eyes: Negative for pain, discharge and redness. Respiratory: Positive for cough, chest tightness and shortness of breath. Negative for wheezing. Cardiovascular: Negative for chest pain. Gastrointestinal: Positive for diarrhea and nausea. Negative for abdominal pain and vomiting. Genitourinary: Negative for dysuria and frequency. Musculoskeletal: Positive for arthralgias and myalgias. Negative for back pain. Skin: Negative for rash and wound. Neurological: Positive for headaches. Negative for weakness. Hematological: Negative for adenopathy. All other systems reviewed and are negative. Physical Exam  Vitals and nursing note reviewed. Constitutional:       Appearance: Normal appearance. He is well-developed. He is not ill-appearing or toxic-appearing. HENT:      Head: Normocephalic and atraumatic. Right Ear: External ear normal.      Left Ear: External ear normal.      Nose: Nose normal.      Mouth/Throat:      Mouth: Mucous membranes are moist.   Eyes:      Extraocular Movements: Extraocular movements intact. Conjunctiva/sclera: Conjunctivae normal.      Pupils: Pupils are equal, round, and reactive to light. Cardiovascular:      Rate and Rhythm: Regular rhythm. Tachycardia present. Heart sounds: Normal heart sounds. No murmur heard. Pulmonary:      Effort: Pulmonary effort is normal. No respiratory distress. Breath sounds: Normal breath sounds. No stridor. No wheezing, rhonchi or rales. Abdominal:      General: Bowel sounds are normal.      Palpations: Abdomen is soft. Tenderness: There is no abdominal tenderness. There is no guarding or rebound. Musculoskeletal:         General: No tenderness or deformity. Cervical back: Normal range of motion and neck supple. Skin:     General: Skin is warm and dry. Capillary Refill: Capillary refill takes 2 to 3 seconds. Neurological:      General: No focal deficit present. Mental Status: He is alert and oriented to person, place, and time. Cranial Nerves: No cranial nerve deficit. Sensory: No sensory deficit. Motor: No weakness. Coordination: Coordination normal.          Procedures     MDM     ED Course as of Aug 30 2348   Mon Aug 30, 2021   2027 EKG: This EKG is signed by emergency department physician. Rate: 114  Rhythm: Sinus  Interpretation: Sinus tachycardia, , rate 114, inferior infarct age undetermined  Comparison: changes compared to previous EKG and rate increased compared to previous EKG        [JG]   2028 Patient got hypoxic with ambulation on the waiting room, saturating 92 to 93% on room air currently at rest, complaining of cough, give Tessalon Perles, obtain CTA    [JG]   2240 Patient would normally go to [facility] but family requested Dr. Terry Jonas, he remains stable    [JG]   2240 Patient presenting emergency department for shortness of breath, diarrhea, fevers, cough. He is Covid positive. Is found be hypoxic with ambulation. Was given steroids, Tessalon Perles for his cough, as well as Tylenol. He was admitted to hospital for further work-up and management of COVID-19 pneumonia with acute hypoxic respiratory failure.     [JG]   2256 ED Attending Note and Attestation     Clarissaia Ahr is a  78 y.o. male patient presenting with shortness of breath fevers cough. Patient known Covid positive. Was vaccinated. Past medical history, allergies and medication list reviewed. Nursing notes, triage note, and vital signs reviewed. Exam: Mild distress, AOx3, heart regular rate rhythm, lung sounds clear bilaterally, mild respiratory distress, abdomen soft nontender    MDM: Patient presents Covid positive, with shortness of breath, noted to be hypoxic with ambulation. Otherwise hemodynamically stable. Patient admitted for ongoing valuation and management. Patient agreeable to admission    This patient's history, physical exam and any procedures were performed by the resident. I have personally seen and examined this patient. I have fully participated in the care of this patient.  I have reviewed all pertinent clinical information, including history, physical exam and plan with the resident. Rossy Lemos DO  10:53 PM EDT   8/30/21         [DB]      ED Course User Index  [DB] Xiomara Calix DO  [JG] Blanca Couch MD      ED Course as of Aug 30 2348   [facility] Aug 30, 2021   2027 EKG: This EKG is signed by emergency department physician. Rate: 114  Rhythm: Sinus  Interpretation: Sinus tachycardia, , rate 114, inferior infarct age undetermined  Comparison: changes compared to previous EKG and rate increased compared to previous EKG        [JG]   2028 Patient got hypoxic with ambulation on the waiting room, saturating 92 to 93% on room air currently at rest, complaining of cough, give Tessalon Perles, obtain CTA    [JG]   2240 Patient would normally go to [facility] but family requested Dr. Vincenzo Carrera, he remains stable    [JG]   2240 Patient presenting emergency department for shortness of breath, diarrhea, fevers, cough. He is Covid positive. Is found be hypoxic with ambulation. Was given steroids, Tessalon Perles for his cough, as well as Tylenol.   He was admitted to hospital for further work-up and management of COVID-19 pneumonia with acute hypoxic respiratory failure. [JG]   3834 ED Attending Note and Michael Sapp is a  78 y.o. male patient presenting with shortness of breath fevers cough. Patient known Covid positive. Was vaccinated. Past medical history, allergies and medication list reviewed. Nursing notes, triage note, and vital signs reviewed. Exam: Mild distress, AOx3, heart regular rate rhythm, lung sounds clear bilaterally, mild respiratory distress, abdomen soft nontender    MDM: Patient presents Covid positive, with shortness of breath, noted to be hypoxic with ambulation. Otherwise hemodynamically stable. Patient admitted for ongoing valuation and management. Patient agreeable to admission    This patient's history, physical exam and any procedures were performed by the resident. I have personally seen and examined this patient. I have fully participated in the care of this patient.  I have reviewed all pertinent clinical information, including history, physical exam and plan with the resident. Lois Pablo DO  10:53 PM EDT   8/30/21         [DB]      ED Course User Index  [DB] Daphney Merida DO  [JG] Gaby Tapia MD     Patient presenting emergency department for shortness of breath, diarrhea, fevers, cough. He is Covid positive. Is found be hypoxic with ambulation. Was given steroids, Tessalon Perles for his cough, as well as Tylenol. He was admitted to hospital for further work-up and management of COVID-19 pneumonia with acute hypoxic respiratory failure.   --------------------------------------------- PAST HISTORY ---------------------------------------------  Past Medical History:  has a past medical history of Abnormal stress test, Cancer ([facility] Chronic back pain, Hyperlipidemia, Hypertension, and PAF (paroxysmal atrial fibrillation) ([facility] [address]     Past Surgical History:  has a past surgical history that includes Small intestine surgery; lymph node biopsy; Spine surgery; Colonoscopy; and Rotator cuff repair. Social History:  reports that he has quit smoking. His smoking use included cigars. He started smoking about 41 years ago. He has quit using smokeless tobacco. He reports current alcohol use. He reports that he does not use drugs. Family History: family history includes Heart Disease in his sister; Stroke in his father. The patients home medications have been reviewed. Allergies: Patient has no known allergies.     -------------------------------------------------- RESULTS -------------------------------------------------    Lab  Results for orders placed or performed during the hospital encounter of 08/30/21   CBC Auto Differential   Result Value Ref Range    WBC 10.7 4.5 - 11.5 E9/L    RBC 5.60 3.80 - 5.80 E12/L    Hemoglobin 16.6 (H) 12.5 - 16.5 g/dL    Hematocrit 49.7 37.0 - 54.0 %    MCV 88.8 80.0 - 99.9 fL    MCH 29.6 26.0 - 35.0 pg    MCHC 33.4 32.0 - 34.5 %    RDW 12.6 11.5 - 15.0 fL    Platelets 849 565 - 217 E9/L    MPV 10.2 7.0 - 12.0 fL    Neutrophils % 78.2 43.0 - 80.0 %    Immature Granulocytes % 1.0 0.0 - 5.0 %    Lymphocytes % 12.6 (L) 20.0 - 42.0 %    Monocytes % 8.1 2.0 - 12.0 %    Eosinophils % 0.0 0.0 - 6.0 %    Basophils % 0.1 0.0 - 2.0 %    Neutrophils Absolute 8.35 (H) 1.80 - 7.30 E9/L    Immature Granulocytes # 0.11 E9/L    Lymphocytes Absolute 1.34 (L) 1.50 - 4.00 E9/L    Monocytes Absolute 0.86 0.10 - 0.95 E9/L    Eosinophils Absolute 0.00 (L) 0.05 - 0.50 E9/L    Basophils Absolute 0.01 0.00 - 0.20 E9/L   Comprehensive Metabolic Panel w/ Reflex to MG   Result Value Ref Range    Sodium 136 132 - 146 mmol/L    Potassium reflex Magnesium 3.6 3.5 - 5.0 mmol/L    Chloride 98 98 - 107 mmol/L    CO2 26 22 - 29 mmol/L    Anion Gap 12 7 - 16 mmol/L    Glucose 103 (H) 74 - 99 mg/dL    BUN 17 6 - 23 mg/dL    CREATININE 0.9 0.7 - 1.2 mg/dL    GFR Non-African American >60 >=60 mL/min/1.73    GFR African American >60     Calcium 9.3 8.6 - 10.2 mg/dL    Total Protein 7.7 6.4 - 8.3 g/dL    Albumin 4.1 3.5 - 5.2 g/dL    Total Bilirubin 0.8 0.0 - 1.2 mg/dL    Alkaline Phosphatase 162 (H) 40 - 129 U/L    ALT 70 (H) 0 - 40 U/L    AST 73 (H) 0 - 39 U/L   Protime-INR   Result Value Ref Range    Protime 13.1 (H) 9.3 - 12.4 sec    INR 1.2    APTT   Result Value Ref Range    aPTT 28.9 24.5 - 35.1 sec   Fibrinogen   Result Value Ref Range    Fibrinogen >700 (H) 225 - 540 mg/dL   Lactate Dehydrogenase   Result Value Ref Range     135 - 225 U/L   D-Dimer, Quantitative   Result Value Ref Range    D-Dimer, Quant 1003 ng/mL DDU   Troponin   Result Value Ref Range    Troponin, High Sensitivity 10 0 - 11 ng/L   Lactic Acid, Plasma   Result Value Ref Range    Lactic Acid 1.1 0.5 - 2.2 mmol/L   EKG 12 lead   Result Value Ref Range    Ventricular Rate 114 BPM    Atrial Rate 114 BPM    P-R Interval 150 ms    QRS Duration 76 ms    Q-T Interval 302 ms    QTc Calculation (Bazett) 416 ms    P Axis 17 degrees    R Axis -23 degrees    T Axis 34 degrees       Radiology  CTA PULMONARY W CONTRAST   Final Result   No evidence of pulmonary embolism. Bilateral ground-glass to slightly greater than ground-glass density   infiltrates, without pleural effusion, bronchial wall thickening or   lymphadenopathy. Appearance is consistent with bilateral multifocal pneumonia, with global   appearance and constellation of findings favoring COVID pneumonia. Prominent 14 mm nonobstructing left nephrolith. XR CHEST PORTABLE   Final Result   Pneumonia which is likely bilateral.  The findings are compatible with   COVID-19.                 ------------------------- NURSING NOTES AND VITALS REVIEWED ---------------------------  Date / Time Roomed:  8/30/2021  7:54 PM  ED Bed Assignment:  08/08    The nursing notes within the ED encounter and vital signs as below have been reviewed.    Patient Vitals for the past 24 hrs:   BP Temp Temp src Pulse Resp SpO2 Height Weight   08/30/21 2242 132/71 98 °F (36.7 °C) Oral 88 16 97 % -- --   08/30/21 1654 139/81 -- -- 100 -- 97 % -- --   08/30/21 1500 115/80 97.1 °F (36.2 °C) -- 113 24 95 % 5' 5\" (1.651 m) 180 lb (81.6 kg)       Oxygen Saturation Interpretation: Abnormal and Improved after treatment          --------------------------------- ADDITIONAL PROVIDER NOTES ---------------------------------  Consultations:  Spoke with Dr. Janis Zazueta,  They will admit this patient. This patient's ED course included: a personal history and physicial examination, re-evaluation prior to disposition, multiple bedside re-evaluations, IV medications, cardiac monitoring, continuous pulse oximetry and complex medical decision making and emergency management    This patient has remained hemodynamically stable and been closely monitored during their ED course. Please note that the withdrawal or failure to initiate urgent interventions for this patient would likely result in a life threatening deterioration or permanent disability. Accordingly this patient received 32 minutes of critical care time, excluding separately billable procedures. Clinical Impression  1. COVID-19    2. Acute respiratory failure with hypoxia (HCC)          Disposition  Patient's disposition: Admit to telemetry  Patient's condition is stable.          Deniz Silverio MD  Resident  08/30/21 2918

## 2021-08-30 NOTE — ED NOTES
FIRST PROVIDER CONTACT ASSESSMENT NOTE                                                                                                Department of Emergency Medicine                                                      First Provider Note  21  3:05 PM EDT  NAME: Gucci Rodriguez  : 1955  MRN: [id number]    Chief Complaint: Positive For Covid-19 (, reports feeling worse, SpO2 97% RA at pivot), Shortness of Breath, Diarrhea, Fever, and Cough      History of Present Illness:   Gucci Rodriguez is a 78 y.o. male who presents to the ED for testing positive for COVID last Thursday. Had Pfhiser vaccine in march. Increased SOB x 3 days. Pulse ox 88% on RA with light ambulation. Improved to 97% at rest.     Focused Physical Exam:  VS:    ED Triage Vitals [21 1500]   BP Temp Temp src Pulse Resp SpO2 Height Weight   115/80 97.1 °F (36.2 °C) -- 113 24 95 % 5' 5\" (1.651 m) 180 lb (81.6 kg)        General: Alert and in no apparent distress. Medical History:  has a past medical history of Abnormal stress test, Cancer ([address] Chronic back pain, Hyperlipidemia, Hypertension, and PAF (paroxysmal atrial fibrillation) ([facility] [address] Surgical History:  has a past surgical history that includes Small intestine surgery; lymph node biopsy; Spine surgery; Colonoscopy; and Rotator cuff repair. Social History:  reports that he has quit smoking. His smoking use included cigars. He started smoking about 41 years ago. He has quit using smokeless tobacco. He reports current alcohol use. He reports that he does not use drugs. Family History: family history includes Heart Disease in his sister; Stroke in his father. Allergies: Patient has no known allergies.      Initial Plan of Care:  Initiate Treatment-Testing, Proceed toTreatment Area When Bed Available for ED Attending/MLP to Continue Care    -------------------------------------------------END OF FIRST PROVIDER CONTACT ASSESSMENT NOTE--------------------------------------------------------  Electronically signed by Rosamond Sandhoff, PA   DD: 8/30/21       CHILO Humphreys  08/30/21 8401

## 2021-08-30 NOTE — ED NOTES
Ambulating room air pulse ox 88% pt recovers to 95% on room air while at rest     Rudolph Rodriguez RN  08/30/21 1029

## 2021-08-30 NOTE — LETTER
Beneficiary Notification Letter  BPCI Advanced     Your Doctor or [address],    We wanted to let you know that your health care provider, [address], has volunteered to take part in our [facility] for [facility] & Medicaid Services (CMS) Bundled Payments for [facility] (BPCI Advanced). This doesnt change your Medicare rights or benefits and you dont need to do anything. What are bundled payments? A bundled payment combines, or bundles together, payments that Medicare makes to your health care providers for the many different kinds of medical services you might get in a specific time period. In BPCI Advanced, this time period could include a hospital inpatient stay or outpatient procedure, plus 90 days. Why would Medicare bundle payments? Bundled payments are thought of as a value-based way to pay because health care providers are responsible for both the quality and cost of medical care they give. This is a relatively new way of paying health care providers compared to thefee-for-service way Medicare has traditionally paid, where providers are paid separately for each service they provide. Bundled payments encourage these providers to work together to provide better, more coordinated care during your hospital stay, or outpatient procedure, and through your recovery. What does BPCI Advance mean for me? Youre more likely to get even better care when hospitals, doctors, and other health care providers work together. In BPCI Advanced, hospitals, doctors, and other health care providers may be rewarded for providing better, more coordinated health care. Medicare will watch BPCI Advanced participants closely to make sure that you and other patients keep getting efficient, high quality care. What do I need to know about BPCI Advanced?   Whats most important for you to know is that your Medicare rights and benefits wont change because your health care provider is participating in [facility]. Medicare will keep covering all of your medically necessary services. Even though Medicare will pay your doctor in a different way under BPCI Advanced, how much you have to pay wont change. Health care providers and suppliers who are enrolled in Medicare will submit their Medicare claims like they always have. Youll have all the same Medicare rights and protections, including the right to choose which hospital, doctor, or other health care provider you see. If you dont want to get care from a health care provider whos participating in [facility], then youll have to choose a different health care provider whos not participating in the Model. How can I give feedback about my health care? Medicare might ask you to take a voluntary survey about the services and care you received from [address] [place] [address] during your hospital stay or outpatient procedure and for a specific period of time afterwards. You can decide whether you want to take the voluntary survey, but if you do, itll help Medicare make BPCI Advanced and the care of other Medicare patients better. If you have concerns or complaints about your care, you can:   · Talk to your doctor or health care provider. · Contact your Beneficiary and Family Centered Care Quality Improvement   Organization [employer] [facility]). You can get your [employer]s phone number  at Medicare.gov/contacts or by calling 1-800-MEDICARE. TTY users can  call 5-833.711.4581. Where can I learn more about BPCI Advanced? Learn more about BPCI Advanced at https://innovation.cms.gov/initiatives/bpci-advanced/:  · A list of all the hospitals and physician group practices in the country participating in [address] [place] [address]. · All of the inpatient and outpatient Clinical Episodes that are currently included under BPCI Advanced.  A Clinical Episode is a grouping of medical conditions or diagnoses that are included in the [address].

## 2021-08-31 LAB
ADENOVIRUS BY PCR: NOT DETECTED
BASOPHILS ABSOLUTE: 0.01 E9/L (ref 0–0.2)
BASOPHILS RELATIVE PERCENT: 0.1 % (ref 0–2)
BORDETELLA PARAPERTUSSIS BY PCR: NOT DETECTED
BORDETELLA PERTUSSIS BY PCR: NOT DETECTED
C-REACTIVE PROTEIN: 10.3 MG/DL (ref 0–0.4)
C-REACTIVE PROTEIN: 10.3 MG/DL (ref 0–0.4)
CHLAMYDOPHILIA PNEUMONIAE BY PCR: NOT DETECTED
CORONAVIRUS 229E BY PCR: NOT DETECTED
CORONAVIRUS HKU1 BY PCR: NOT DETECTED
CORONAVIRUS NL63 BY PCR: NOT DETECTED
CORONAVIRUS OC43 BY PCR: NOT DETECTED
EOSINOPHILS ABSOLUTE: 0 E9/L (ref 0.05–0.5)
EOSINOPHILS RELATIVE PERCENT: 0 % (ref 0–6)
FERRITIN: 628 NG/ML
HCT VFR BLD CALC: 45.1 % (ref 37–54)
HEMOGLOBIN: 15 G/DL (ref 12.5–16.5)
HUMAN METAPNEUMOVIRUS BY PCR: NOT DETECTED
HUMAN RHINOVIRUS/ENTEROVIRUS BY PCR: NOT DETECTED
IMMATURE GRANULOCYTES #: 0.05 E9/L
IMMATURE GRANULOCYTES %: 0.7 % (ref 0–5)
INFLUENZA A BY PCR: NOT DETECTED
INFLUENZA B BY PCR: NOT DETECTED
LYMPHOCYTES ABSOLUTE: 0.71 E9/L (ref 1.5–4)
LYMPHOCYTES RELATIVE PERCENT: 9.7 % (ref 20–42)
MCH RBC QN AUTO: 29.2 PG (ref 26–35)
MCHC RBC AUTO-ENTMCNC: 33.3 % (ref 32–34.5)
MCV RBC AUTO: 87.9 FL (ref 80–99.9)
MONOCYTES ABSOLUTE: 0.42 E9/L (ref 0.1–0.95)
MONOCYTES RELATIVE PERCENT: 5.7 % (ref 2–12)
MYCOPLASMA PNEUMONIAE BY PCR: NOT DETECTED
NEUTROPHILS ABSOLUTE: 6.13 E9/L (ref 1.8–7.3)
NEUTROPHILS RELATIVE PERCENT: 83.8 % (ref 43–80)
PARAINFLUENZA VIRUS 1 BY PCR: NOT DETECTED
PARAINFLUENZA VIRUS 2 BY PCR: NOT DETECTED
PARAINFLUENZA VIRUS 3 BY PCR: NOT DETECTED
PARAINFLUENZA VIRUS 4 BY PCR: NOT DETECTED
PDW BLD-RTO: 12.5 FL (ref 11.5–15)
PLATELET # BLD: 251 E9/L (ref 130–450)
PMV BLD AUTO: 10.6 FL (ref 7–12)
PROCALCITONIN: 0.06 NG/ML (ref 0–0.08)
PROCALCITONIN: 0.08 NG/ML (ref 0–0.08)
RBC # BLD: 5.13 E12/L (ref 3.8–5.8)
REASON FOR REJECTION: NORMAL
REJECTED TEST: NORMAL
RESPIRATORY SYNCYTIAL VIRUS BY PCR: NOT DETECTED
SARS-COV-2, PCR: DETECTED
WBC # BLD: 7.3 E9/L (ref 4.5–11.5)

## 2021-08-31 PROCEDURE — 87070 CULTURE OTHR SPECIMN AEROBIC: CPT

## 2021-08-31 PROCEDURE — 36415 COLL VENOUS BLD VENIPUNCTURE: CPT

## 2021-08-31 PROCEDURE — 0202U NFCT DS 22 TRGT SARS-COV-2: CPT

## 2021-08-31 PROCEDURE — 2060000000 HC ICU INTERMEDIATE R&B

## 2021-08-31 PROCEDURE — 6370000000 HC RX 637 (ALT 250 FOR IP): Performed by: PHYSICIAN ASSISTANT

## 2021-08-31 PROCEDURE — 6370000000 HC RX 637 (ALT 250 FOR IP): Performed by: NURSE PRACTITIONER

## 2021-08-31 PROCEDURE — 6360000002 HC RX W HCPCS: Performed by: INTERNAL MEDICINE

## 2021-08-31 PROCEDURE — 2580000003 HC RX 258: Performed by: INTERNAL MEDICINE

## 2021-08-31 PROCEDURE — 85025 COMPLETE CBC W/AUTO DIFF WBC: CPT

## 2021-08-31 PROCEDURE — 87206 SMEAR FLUORESCENT/ACID STAI: CPT

## 2021-08-31 PROCEDURE — 86140 C-REACTIVE PROTEIN: CPT

## 2021-08-31 PROCEDURE — 6370000000 HC RX 637 (ALT 250 FOR IP): Performed by: INTERNAL MEDICINE

## 2021-08-31 PROCEDURE — 87449 NOS EACH ORGANISM AG IA: CPT

## 2021-08-31 PROCEDURE — 84145 PROCALCITONIN (PCT): CPT

## 2021-08-31 PROCEDURE — 99233 SBSQ HOSP IP/OBS HIGH 50: CPT | Performed by: STUDENT IN AN ORGANIZED HEALTH CARE EDUCATION/TRAINING PROGRAM

## 2021-08-31 RX ORDER — ZINC SULFATE 50(220)MG
50 CAPSULE ORAL DAILY
Status: DISCONTINUED | OUTPATIENT
Start: 2021-08-31 | End: 2021-09-04 | Stop reason: HOSPADM

## 2021-08-31 RX ORDER — ATORVASTATIN CALCIUM 40 MG/1
40 TABLET, FILM COATED ORAL DAILY
Status: DISCONTINUED | OUTPATIENT
Start: 2021-08-31 | End: 2021-09-04 | Stop reason: HOSPADM

## 2021-08-31 RX ORDER — SODIUM CHLORIDE 9 MG/ML
25 INJECTION, SOLUTION INTRAVENOUS PRN
Status: DISCONTINUED | OUTPATIENT
Start: 2021-08-31 | End: 2021-09-04 | Stop reason: HOSPADM

## 2021-08-31 RX ORDER — SODIUM CHLORIDE 0.9 % (FLUSH) 0.9 %
5-40 SYRINGE (ML) INJECTION EVERY 12 HOURS SCHEDULED
Status: DISCONTINUED | OUTPATIENT
Start: 2021-08-31 | End: 2021-09-04 | Stop reason: HOSPADM

## 2021-08-31 RX ORDER — M-VIT,TX,IRON,MINS/CALC/FOLIC 27MG-0.4MG
1 TABLET ORAL DAILY
Status: DISCONTINUED | OUTPATIENT
Start: 2021-08-31 | End: 2021-09-04 | Stop reason: HOSPADM

## 2021-08-31 RX ORDER — ACETAMINOPHEN 325 MG/1
650 TABLET ORAL EVERY 6 HOURS PRN
Status: DISCONTINUED | OUTPATIENT
Start: 2021-08-31 | End: 2021-08-31 | Stop reason: SDUPTHER

## 2021-08-31 RX ORDER — GEMFIBROZIL 600 MG/1
600 TABLET, FILM COATED ORAL 2 TIMES DAILY
Status: DISCONTINUED | OUTPATIENT
Start: 2021-08-31 | End: 2021-09-04 | Stop reason: HOSPADM

## 2021-08-31 RX ORDER — ONDANSETRON 4 MG/1
4 TABLET, ORALLY DISINTEGRATING ORAL EVERY 8 HOURS PRN
Status: DISCONTINUED | OUTPATIENT
Start: 2021-08-31 | End: 2021-09-04 | Stop reason: HOSPADM

## 2021-08-31 RX ORDER — DEXAMETHASONE SODIUM PHOSPHATE 4 MG/ML
6 INJECTION, SOLUTION INTRA-ARTICULAR; INTRALESIONAL; INTRAMUSCULAR; INTRAVENOUS; SOFT TISSUE EVERY 24 HOURS
Status: DISCONTINUED | OUTPATIENT
Start: 2021-08-31 | End: 2021-09-04 | Stop reason: HOSPADM

## 2021-08-31 RX ORDER — POLYETHYLENE GLYCOL 3350 17 G/17G
17 POWDER, FOR SOLUTION ORAL DAILY PRN
Status: DISCONTINUED | OUTPATIENT
Start: 2021-08-31 | End: 2021-09-04 | Stop reason: HOSPADM

## 2021-08-31 RX ORDER — ASPIRIN 81 MG/1
81 TABLET ORAL DAILY
Status: DISCONTINUED | OUTPATIENT
Start: 2021-08-31 | End: 2021-09-04 | Stop reason: HOSPADM

## 2021-08-31 RX ORDER — GEMFIBROZIL 600 MG/1
600 TABLET, FILM COATED ORAL
COMMUNITY

## 2021-08-31 RX ORDER — SODIUM CHLORIDE 0.9 % (FLUSH) 0.9 %
5-40 SYRINGE (ML) INJECTION PRN
Status: DISCONTINUED | OUTPATIENT
Start: 2021-08-31 | End: 2021-09-04 | Stop reason: HOSPADM

## 2021-08-31 RX ORDER — ACETAMINOPHEN 650 MG/1
650 SUPPOSITORY RECTAL EVERY 6 HOURS PRN
Status: DISCONTINUED | OUTPATIENT
Start: 2021-08-31 | End: 2021-08-31 | Stop reason: SDUPTHER

## 2021-08-31 RX ORDER — ALLOPURINOL 100 MG/1
100 TABLET ORAL DAILY
Status: DISCONTINUED | OUTPATIENT
Start: 2021-08-31 | End: 2021-09-04 | Stop reason: HOSPADM

## 2021-08-31 RX ORDER — BENZONATATE 100 MG/1
200 CAPSULE ORAL 3 TIMES DAILY
Status: DISCONTINUED | OUTPATIENT
Start: 2021-08-31 | End: 2021-09-04 | Stop reason: HOSPADM

## 2021-08-31 RX ORDER — VITAMIN B COMPLEX
4000 TABLET ORAL DAILY
Status: DISCONTINUED | OUTPATIENT
Start: 2021-08-31 | End: 2021-09-04 | Stop reason: HOSPADM

## 2021-08-31 RX ORDER — GUAIFENESIN/DEXTROMETHORPHAN 100-10MG/5
5 SYRUP ORAL EVERY 4 HOURS PRN
Status: DISCONTINUED | OUTPATIENT
Start: 2021-08-31 | End: 2021-09-04 | Stop reason: HOSPADM

## 2021-08-31 RX ORDER — PANTOPRAZOLE SODIUM 40 MG/1
40 TABLET, DELAYED RELEASE ORAL
Status: DISCONTINUED | OUTPATIENT
Start: 2021-09-01 | End: 2021-09-04 | Stop reason: HOSPADM

## 2021-08-31 RX ORDER — ASCORBIC ACID 500 MG
500 TABLET ORAL DAILY
Status: DISCONTINUED | OUTPATIENT
Start: 2021-08-31 | End: 2021-09-04 | Stop reason: HOSPADM

## 2021-08-31 RX ORDER — ONDANSETRON 2 MG/ML
4 INJECTION INTRAMUSCULAR; INTRAVENOUS EVERY 6 HOURS PRN
Status: DISCONTINUED | OUTPATIENT
Start: 2021-08-31 | End: 2021-09-04 | Stop reason: HOSPADM

## 2021-08-31 RX ORDER — ALBUTEROL SULFATE 90 UG/1
2 AEROSOL, METERED RESPIRATORY (INHALATION) 4 TIMES DAILY
Status: DISCONTINUED | OUTPATIENT
Start: 2021-08-31 | End: 2021-09-04 | Stop reason: HOSPADM

## 2021-08-31 RX ADMIN — ALLOPURINOL 100 MG: 100 TABLET ORAL at 08:12

## 2021-08-31 RX ADMIN — DEXAMETHASONE SODIUM PHOSPHATE 6 MG: 4 INJECTION, SOLUTION INTRA-ARTICULAR; INTRALESIONAL; INTRAMUSCULAR; INTRAVENOUS; SOFT TISSUE at 20:19

## 2021-08-31 RX ADMIN — GEMFIBROZIL 600 MG: 600 TABLET ORAL at 08:12

## 2021-08-31 RX ADMIN — GEMFIBROZIL 600 MG: 600 TABLET ORAL at 15:33

## 2021-08-31 RX ADMIN — BENZONATATE 200 MG: 100 CAPSULE ORAL at 20:20

## 2021-08-31 RX ADMIN — BENZONATATE 200 MG: 100 CAPSULE ORAL at 15:33

## 2021-08-31 RX ADMIN — ACETAMINOPHEN 650 MG: 325 TABLET ORAL at 22:34

## 2021-08-31 RX ADMIN — ENOXAPARIN SODIUM 30 MG: 30 INJECTION SUBCUTANEOUS at 08:13

## 2021-08-31 RX ADMIN — ENOXAPARIN SODIUM 30 MG: 30 INJECTION SUBCUTANEOUS at 20:21

## 2021-08-31 RX ADMIN — ATORVASTATIN CALCIUM 40 MG: 40 TABLET, FILM COATED ORAL at 20:20

## 2021-08-31 RX ADMIN — OXYCODONE HYDROCHLORIDE AND ACETAMINOPHEN 500 MG: 500 TABLET ORAL at 15:33

## 2021-08-31 RX ADMIN — ZINC SULFATE 220 MG (50 MG) CAPSULE 50 MG: CAPSULE at 15:32

## 2021-08-31 RX ADMIN — ONDANSETRON 4 MG: 2 INJECTION INTRAMUSCULAR; INTRAVENOUS at 20:37

## 2021-08-31 RX ADMIN — Medication 4000 UNITS: at 08:13

## 2021-08-31 RX ADMIN — ALBUTEROL SULFATE 2 PUFF: 108 AEROSOL, METERED RESPIRATORY (INHALATION) at 15:32

## 2021-08-31 RX ADMIN — ASPIRIN 81 MG: 81 TABLET, COATED ORAL at 08:13

## 2021-08-31 RX ADMIN — MULTIPLE VITAMINS W/ MINERALS TAB 1 TABLET: TAB at 08:13

## 2021-08-31 RX ADMIN — SODIUM CHLORIDE, PRESERVATIVE FREE 10 ML: 5 INJECTION INTRAVENOUS at 20:20

## 2021-08-31 RX ADMIN — SODIUM CHLORIDE, PRESERVATIVE FREE 5 ML: 5 INJECTION INTRAVENOUS at 08:13

## 2021-08-31 RX ADMIN — ALBUTEROL SULFATE 2 PUFF: 108 AEROSOL, METERED RESPIRATORY (INHALATION) at 20:21

## 2021-08-31 ASSESSMENT — PAIN SCALES - GENERAL
PAINLEVEL_OUTOF10: 5
PAINLEVEL_OUTOF10: 5
PAINLEVEL_OUTOF10: 0

## 2021-08-31 ASSESSMENT — PAIN DESCRIPTION - ONSET: ONSET: GRADUAL

## 2021-08-31 ASSESSMENT — PAIN DESCRIPTION - PROGRESSION: CLINICAL_PROGRESSION: GRADUALLY WORSENING

## 2021-08-31 ASSESSMENT — PAIN DESCRIPTION - FREQUENCY: FREQUENCY: INTERMITTENT

## 2021-08-31 ASSESSMENT — PAIN DESCRIPTION - LOCATION: LOCATION: HEAD

## 2021-08-31 ASSESSMENT — PAIN DESCRIPTION - PAIN TYPE: TYPE: ACUTE PAIN

## 2021-08-31 NOTE — CARE COORDINATION
Social Work/Discharge Planning:  Called patient and completed initial assessment. Explained Social Work role and discussed transition of care/discharge planning. COVID rule out and respiratory panel pending. Patient lives with his wife in a two story house. PTA he is independent with no adaptive device. He has no durable medical equipment and denies any history with hhc or snf. He is on room air. His PCP is Dr. Merle Rodriges and pharmacy is [facility] in [facility]. He plans to return home at discharge and denies any home care needs at this time. Will continue to follow and assist with discharge planning.   Electronically signed by MEIR Linares on 8/31/2021 at 11:19 AM

## 2021-08-31 NOTE — PROGRESS NOTES
[facility] Progress Note    Admitting Date and Time: 8/30/2021  7:54 PM  Admit Dx: Acute respiratory failure with hypoxia ([facility] [address] [J96.01]  COVID-19 [U07.1]    Subjective:  Patient is being followed for Acute respiratory failure with hypoxia ([facility] [address] [J96.01]  COVID-19 [U07.1]   Pt feels short of breath on ambulation, reports cough. Per RN: No major concerns. ROS: denies fever, chills, cp, sob, n/v, HA unless stated above.  allopurinol  100 mg Oral Daily    aspirin EC  81 mg Oral Daily    atorvastatin  40 mg Oral Daily    gemfibrozil  600 mg Oral BID    therapeutic multivitamin-minerals  1 tablet Oral Daily    Vitamin D  4,000 Units Oral Daily    sodium chloride flush  5-40 mL IntraVENous 2 times per day    enoxaparin  30 mg SubCUTAneous BID    dexamethasone  6 mg IntraVENous Q24H    [START ON 9/1/2021] pantoprazole  40 mg Oral QAM AC    ascorbic acid  500 mg Oral Daily    zinc sulfate  50 mg Oral Daily    benzonatate  200 mg Oral TID    albuterol sulfate HFA  2 puff Inhalation 4x daily     sodium chloride flush, 5-40 mL, PRN  sodium chloride, 25 mL, PRN  ondansetron, 4 mg, Q8H PRN   Or  ondansetron, 4 mg, Q6H PRN  polyethylene glycol, 17 g, Daily PRN  guaiFENesin-dextromethorphan, 5 mL, Q4H PRN  acetaminophen, 650 mg, Q6H PRN   Or  acetaminophen, 650 mg, Q6H PRN       Objective:    /78   Pulse 80   Temp 98.4 °F (36.9 °C) (Oral)   Resp 18   Ht 5' 5\" (1.651 m)   Wt 180 lb (81.6 kg)   SpO2 91% Comment: up to bathroom  BMI 29.95 kg/m²   Due to the patient's COVID-19-positive status, to reduce exposure, contamination, and in an effort to conserve PPE, this patient was evaluated through a combination of review of the medical record, nursing assessments, other physicians' exams and assessments, as well as telemedicine interaction.     Recent Labs     08/30/21  2042      K 3.6   CL 98   CO2 26   BUN 17   CREATININE 0.9   GLUCOSE 103*   CALCIUM 9.3       Recent Labs 08/30/21 2042 08/31/21  0955   WBC 10.7 7.3   RBC 5.60 5.13   HGB 16.6* 15.0   HCT 49.7 45.1   MCV 88.8 87.9   MCH 29.6 29.2   MCHC 33.4 33.3   RDW 12.6 12.5    251   MPV 10.2 10.6       Micro:  No components found for: [facility])    Radiology:   XR CHEST PORTABLE    Result Date: 8/30/2021  EXAMINATION: ONE XRAY VIEW OF THE CHEST 8/30/2021 2:27 pm COMPARISON: 22 February 2020 HISTORY: ORDERING SYSTEM PROVIDED HISTORY: covid+ TECHNOLOGIST PROVIDED HISTORY: Reason for exam:->covid+ FINDINGS: There is pneumonic infiltrate in the left mid to lower lung and I suspect subtly in the right mid to upper lung. The findings are compatible with COVID-19. Heart and pulmonary vascularity are normal.  Neither costophrenic angle is blunted. Pneumonia which is likely bilateral.  The findings are compatible with COVID-19. CTA PULMONARY W CONTRAST    Result Date: 8/30/2021  EXAMINATION: CTA OF THE CHEST 8/30/2021 9:58 pm TECHNIQUE: CTA of the chest was performed after the administration of intravenous contrast.  Multiplanar reformatted images are provided for review. MIP images are provided for review. Dose modulation, iterative reconstruction, and/or weight based adjustment of the mA/kV was utilized to reduce the radiation dose to as low as reasonably achievable. COMPARISON: No prior chest CT. Correlation is made with a two-view study from earlier today. HISTORY: ORDERING SYSTEM PROVIDED HISTORY: r/o pe TECHNOLOGIST PROVIDED HISTORY: Reason for exam:->r/o pe Decision Support Exception - unselect if not a suspected or confirmed emergency medical condition->Emergency Medical Condition (MA) FINDINGS: Pulmonary Arteries: Pulmonary arteries are adequately opacified for evaluation. No evidence of intraluminal filling defect to suggest pulmonary embolism. Main pulmonary artery is normal in caliber. Mediastinum: No evidence of mediastinal lymphadenopathy. The heart and pericardium demonstrate no acute abnormality.   There is no acute abnormality of the thoracic aorta. Lungs/pleura: No pneumothorax or pleural effusion. In addition to patchy mild bibasilar subsegmental atelectasis, there are patchy ground-glass density to slightly greater than ground-glass density infiltrates seen throughout both lungs, with involvement of all lobes, with relative sparing of the superior left upper lobe. Involvement is specifically seen in the bilateral lower lobes, lateral left upper lobe, lateral right middle lobe and posterolateral right upper lobe. No bronchial wall thickening, pooling endobronchial secretions or significant mucous plugging. Upper Abdomen: Visualized upper abdominal viscera demonstrate no acute abnormality. 4 cm simple right renal cyst is noted, as is a prominent 14 mm nonobstructing nephrolith on the left. 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 Soft Tissues/Bones: No acute bone or soft tissue abnormality. No evidence of pulmonary embolism. Bilateral ground-glass to slightly greater than ground-glass density infiltrates, without pleural effusion, bronchial wall thickening or lymphadenopathy. Appearance is consistent with bilateral multifocal pneumonia, with global appearance and constellation of findings favoring COVID pneumonia. Prominent 14 mm nonobstructing left nephrolith. Assessment:    Active Problems:    Acute respiratory failure with hypoxia (HCC)  Resolved Problems:    * No resolved hospital problems. *      Plan:  1. Hypoxia -resolved, secondary to COVID-19 pneumonia -CRP elevated, currently on room air  Chest x-ray on admission B/L pneumonic infiltrate. CTA chest with no evidence of PE. There are bilateral groundglass infiltrates without pleural effusions. Consistent with multifocal pneumonia. Lab [id number]-H-[id number] 1003, , fibrinogen > 700, CRP 10.3, pro-Keanu low.   Consulted pulmonology for recommendations on remdesivir, Tocilizumab. Continuing dexamethasone, multivitamins. Incentive spirometry, weaning as tolerated, nebulizers. 2.  Hyperlipidemia    3. Chronic back pain    4. Hx of CVA -continue aspirin and statin    5. History of abnormal stress test    6. Paroxysmal A. Fib    7. Hypertension -was on lisinopril, hydrochlorothiazide, taking due to improved blood pressure, continue monitoring. 8.  S/p Pfizer COVID-19 vaccination - completed March 2021.     9.  Former smoker      DVT prophylaxis - Lovenox 30 twice daily    NOTE: This report was transcribed using voice recognition software. Every effort was made to ensure accuracy; however, inadvertent computerized transcription errors may be present.   Electronically signed by Flaco Newman MD on 8/31/2021 at 3:42 PM

## 2021-08-31 NOTE — ACP (ADVANCE CARE PLANNING)
Advance Care Planning     Advance Care Planning Activator (Inpatient)  Conversation Note      Date of ACP Conversation: 8/31/2021     Conversation Conducted with: Patient with Decision Making Capacity    ACP Activator: Kenney Garcia, [address] [place] [address] Decision Maker:     Current Designated Health Care Decision Maker:     Primary Decision Maker: Jesus Gutierrez - Child - [phone]    Secondary Decision Maker: Melonie Viramontes - Spouse - [phone]  Click here to complete Healthcare Decision Makers including section of the Healthcare Decision Maker Relationship (ie \"Primary\")  Today we documented Decision Maker(s) consistent with ACP documents on file. Care Preferences    Ventilation: \"If you were in your present state of health and suddenly became very ill and were unable to breathe on your own, what would your preference be about the use of a ventilator (breathing machine) if it were available to you? \"      Would the patient desire the use of ventilator (breathing machine)?: yes    \"If your health worsens and it becomes clear that your chance of recovery is unlikely, what would your preference be about the use of a ventilator (breathing machine) if it were available to you? \"     Would the patient desire the use of ventilator (breathing machine)?: Yes      Resuscitation  \"CPR works best to restart the heart when there is a sudden event, like a heart attack, in someone who is otherwise healthy. Unfortunately, CPR does not typically restart the heart for people who have serious health conditions or who are very sick. \"    \"In the event your heart stopped as a result of an underlying serious health condition, would you want attempts to be made to restart your heart (answer \"yes\" for attempt to resuscitate) or would you prefer a natural death (answer \"no\" for do not attempt to resuscitate)? \" yes       [x] Yes   [] No   Educated Patient / Decision Maker regarding differences between Advance Directives and portable DNR orders.     Length of ACP Conversation in minutes:  10 minutes    Conversation Outcomes:  [x] ACP discussion completed  [] Existing advance directive reviewed with patient; no changes to patient's previously recorded wishes  [] New Advance Directive completed  [] Portable Do Not Rescitate prepared for Provider review and signature  [] POLST/POST/MOLST/MOST prepared for Provider review and signature      Follow-up plan:    [] Schedule follow-up conversation to continue planning  [x] Referred individual to Provider for additional questions/concerns   [] Advised patient/agent/surrogate to review completed ACP document and update if needed with changes in condition, patient preferences or care setting    [] This note routed to one or more involved healthcare providers

## 2021-08-31 NOTE — PROGRESS NOTES
Pharmacy Documentation     Medication: Remdesivir     Date: 8/30/21  Physician: Aileen Galaviz [address] consulted to initiate remdesivir. Patient does not currently meet [facility] Criteria for Use to initiate remdesivir based on not requiring supplemental oxygen. Pharmacy will sign off. Please re-consult if the clinical condition changes and patient meets criteria for initiation based on [facility] Remdesivir Criteria for Use.      Thank you for this consult,  Henrik Proctor, [facility]

## 2021-08-31 NOTE — H&P
[facility] History and Physical      CHIEF COMPLAINT: Shortness of breath    History of Present Illness:    65-year-old male with history of stroke and hypertension who received Covid vaccine several months ago presented with shortness of breath cough low-grade fever. Patient reported that over the last 2 weeks he was out and about went fishing and couple of hours after fishing that he went to another festival, Sunday night and Monday morning started having some cough with mild shortness of breath he went to get tested at [facility] and on Tuesday he tested positive for COVID-19, his wife tested negative at that time. His symptoms started worsening over the last week with low-grade fever 99.5, with some shortness of breath mainly on ambulation, reports some nausea no vomiting and no abdominal pain, reported some body aches and runs of diarrhea. In the ED patient was ambulated he dropped his oxygen saturation to 88%    Informant(s) for H&P: Patient and chart as well as calling his daughter    REVIEW OF SYSTEMS:  A comprehensive review of systems was negative except for: what is in the HPI      PMH:  Past Medical History:   Diagnosis Date    Abnormal stress test 4/5/2017    Cancer [facility])     Testicular    Chronic back pain     Hyperlipidemia     Hypertension     PAF (paroxysmal atrial fibrillation) ([facility] [address] 4/5/2017       Surgical History:  Past Surgical History:   Procedure Laterality Date    COLONOSCOPY      LYMPH NODE BIOPSY      ROTATOR CUFF REPAIR      SMALL INTESTINE SURGERY      SPINE SURGERY         Medications Prior to Admission:    Prior to Admission medications    Medication Sig Start Date End Date Taking?  Authorizing Provider   atorvastatin (LIPITOR) 40 MG tablet TAKE 1 TABLET BY MOUTH EVERY DAY 1/25/21   Alex Brewer MD   colchicine (COLCRYS) 0.6 MG tablet Take 1 tablet by mouth daily for 14 days 1 tab po qd  Patient not taking: Reported on 11/3/2020 9/8/20 11/3/20 Bridget Richter DPM   etodolac (LODINE) 400 MG tablet Take 1 tablet by mouth 2 times daily 9/8/20 11/7/20  Bridget Richter GONSALOSATHYA   allopurinol (ZYLOPRIM) 100 MG tablet TAKE 1 TABLET BY MOUTH EVERY DAY 7/1/20   Historical Provider, MD   Blood Pressure KIT Check BP  Once daily 5/5/20   Makenzie Anthony MD   ibuprofen (IBU) 600 MG tablet Take 1 tablet by mouth every 8 hours as needed for Pain Take with food. 6/3/19   Jae Bolanos DO   aspirin EC 81 MG EC tablet Take 81 mg by mouth daily    Historical Provider, MD   magnesium gluconate (MAGONATE) 500 MG tablet Take 500 mg by mouth 2 times daily    Historical Provider, MD   psyllium (KONSYL) 28.3 % PACK Take 1 packet by mouth daily    Historical Provider, MD   gemfibrozil (LOPID) 600 MG tablet TAKE 1 TABLET BY MOUTH TWICE A DAY 3/1/17   Historical Provider, MD   VASCEPA 1 G CAPS capsule TAKE TWO CAPSULES TWO TIMES A DAY 2/20/17   Historical Provider, MD   vitamin D (CHOLECALCIFEROL) 1000 UNIT TABS tablet Take 4,000 Units by mouth daily     Historical Provider, MD   Multiple Vitamins-Minerals (THERAPEUTIC MULTIVITAMIN-MINERALS) tablet Take 1 tablet by mouth daily    Historical Provider, MD   lisinopril-hydrochlorothiazide (PRINZIDE;ZESTORETIC) 10-12.5 MG per tablet Take 1 tablet by mouth every other day     Historical Provider, MD   fenofibrate (TRICOR) 145 MG tablet Take 145 mg by mouth daily. Historical Provider, MD       Allergies:    Patient has no known allergies. Social History:    reports that he has quit smoking. His smoking use included cigars. He started smoking about 41 years ago. He has quit using smokeless tobacco. He reports current alcohol use. He reports that he does not use drugs. Family History:   family history includes Heart Disease in his sister; Stroke in his father.        PHYSICAL EXAM:  Vitals:  /81   Pulse 100   Temp 97.1 °F (36.2 °C)   Resp 24   Ht 5' 5\" (1.651 m)   Wt 180 lb (81.6 kg)   SpO2 97%   BMI 29.95 kg/m² General Appearance: alert and oriented to person, place and time and in mild acute distress  Skin: warm and dry  Head: normocephalic and atraumatic  Eyes: pupils equal, round, and reactive to light, extraocular eye movements intact, conjunctivae normal  Neck: neck supple and non tender without mass   Pulmonary/Chest: Crackles bilaterally- no wheezes, normal air movement, mild respiratory distress  Cardiovascular: normal rate, normal S1 and S2 and no carotid bruits  Abdomen: soft, non-tender, non-distended, normal bowel sounds, no masses or organomegaly  Extremities: no cyanosis, no clubbing and no edema  Neurologic: no cranial nerve deficit and speech normal        LABS:  Recent Labs     08/30/21 2042      K 3.6   CL 98   CO2 26   BUN 17   CREATININE 0.9   GLUCOSE 103*   CALCIUM 9.3       Recent Labs     08/30/21 2042   WBC 10.7   RBC 5.60   HGB 16.6*   HCT 49.7   MCV 88.8   MCH 29.6   MCHC 33.4   RDW 12.6      MPV 10.2       No results for input(s): POCGLU in the last 72 hours. Radiology:   XR CHEST PORTABLE   Final Result   Pneumonia which is likely bilateral.  The findings are compatible with   COVID-19. CTA PULMONARY W CONTRAST    (Results Pending)       EKG: Sinus tachycardia with no acute ischemic changes    ASSESSMENT:      Active Problems:    Acute respiratory failure with hypoxia (HCC)  Resolved Problems:    * No resolved hospital problems. *      PLAN:    1. Acute hypoxic respiratory failure, oxygen saturation dropped to 88% on ambulation, in the setting of COVID-19 pneumonia, elevated D-dimer, pending CTA of chest to rule out PE.   2.  COVID-19 pneumonia, patient is vaccinated, start patient on Decadron and remdesivir, monitor inflammatory markers, D-dimer is elevated, pending CTA chest to rule out PE.  3.  Questioning superimposed bacterial infection, white count of 10.7 which is unusual for COVID-19 infection usually comes in with leukopenia and lymphopenia, obtain procalcitonin strep and Legionella antigen, consider starting patient on antibiotics. 4.  History of stroke, currently stable no new symptoms, continue on aspirin and statin. 5.  History of hypertension, patient is to be on lisinopril/hydrochlorothiazide combination medication, more recently he stopped taking it with pressure being controlled, continue monitoring for now. Code Status: Full code  DVT prophylaxis: Lovenox      NOTE: This report was transcribed using voice recognition software. Every effort was made to ensure accuracy; however, inadvertent computerized transcription errors may be present.   Electronically signed by Juan Luis Rendon MD on 8/30/2021 at 10:35 PM

## 2021-08-31 NOTE — CONSULTS
Colten Helms M.D.,[facility]  Iris Martínez D.O., F.A.C.O.I., Jojo Malin M.D. Viridiana Verde M.D. Krista Rutledge D.O. Patient:  Eneida Walters 78 y.o. male MRN: [id number]     Date of Service: 8/31/2021      PULMONARY CONSULTATION    Reason for Consultation: COVID-19 pneumonia, CRP elevated, not on oxygen, remdesivir recommendations  Referring Physician: Dr. Angelica Duval MD    Communication with the referring physician will be sent via the electronic medical record. Chief Complaint: Cough, shortness of breath    CODE STATUS: Full    SUBJECTIVE:  HPI:  Eneida Waletrs is a 78 y.o.  male who we are asked to evaluate for COVID-19 pneumonia with elevated CRP. Not requiring supplemental oxygen, asking for recommendations regarding remdesivir. Patient is previously vaccinated against COVID-19 received 2 doses of Pfizer completed March 2021. His past medical history includes testicular cancer, hyperlipidemia, chronic back pain, stroke, hypertension, paroxysmal A. fib, abnormal stress test.  He is a former smoker of cigars and smokeless tobacco quit many years ago. He denies formal diagnosis of COPD or asthma. Takes no medication at home for his breathing. He presented to the ED at [facility] on 8/30/2021 with complaint of chills, fatigue, and fever. He admitted to congestion and sore throat. Cough and chest tightness. Diarrhea and nausea. Arthralgias and headaches. His symptoms started on 8/23/2021 this is the day he tested positive. Chest x-ray on admission pneumonic infiltrate in the left mid to lower lung and subtly in the right mid to upper lung. CTA chest with no evidence of PE. There are bilateral groundglass infiltrates without pleural effusions. Consistent with multifocal pneumonia.   Lab kjzqszj-R-dpwzp 1003, , fibrinogen greater than 700, INR 1.2, ALT 70, AST 73, lactic acid 1.1, BUN 17, creatinine 0.9, potassium 3.6, sodium 136, WBCs 10.7, hemoglobin 16.6, platelets 571, lymphocytes 12. 6. CRP 10.3. He is lying in bed in no acute distress not requiring supplemental oxygen at rest.  We will determine ambulatory O2 requirements to see if he needs supplemental oxygen with exertion. He continues to cough not expectorating. No fevers documented since admission. Past Medical History:   Diagnosis Date    Abnormal stress test 4/5/2017    Cancer [facility])     Testicular    Chronic back pain     Hyperlipidemia     Hypertension     PAF (paroxysmal atrial fibrillation) ([facility] [address] 4/5/2017       Past Surgical History:   Procedure Laterality Date    COLONOSCOPY      LYMPH NODE BIOPSY      ROTATOR CUFF REPAIR      SMALL INTESTINE SURGERY      SPINE SURGERY         Family History   Problem Relation Age of Onset    Stroke Father     Heart Disease Sister        Social History:   Social History     Socioeconomic History    Marital status:      Spouse name: Not on file    Number of children: Not on file    Years of education: Not on file    Highest education level: Not on file   Occupational History    Not on file   Tobacco Use    Smoking status: Former Smoker     Types: Cigars     Start date: 1/1/1980    Smokeless tobacco: Former User   Substance and Sexual Activity    Alcohol use: Yes    Drug use: No    Sexual activity: Not on file   Other Topics Concern    Not on file   Social History Narrative    Not on file     Social Determinants of Health     Financial Resource Strain:     Difficulty of Paying Living Expenses:    Food Insecurity:     Worried About Running Out of Food in the Last Year:     [address] in the Last Year:    Transportation Needs:     Lack of Transportation (Medical):      Lack of Transportation (Non-Medical):    Physical Activity:     Days of Exercise per Week:     Minutes of Exercise per Session:    Stress:     Feeling of Stress :    Social Connections:     Frequency of Communication with Friends and Family:  Frequency of Social Gatherings with Friends and Family:     Attends [employer] Services:     Active Member of Clubs or Organizations:     Attends Club or Organization Meetings:     Marital Status:    Intimate Partner Violence:     Fear of Current or Ex-Partner:     Emotionally Abused:     Physically Abused:     Sexually Abused:      Smoking history: The patient is a former smoker of cigars used smokeless tobacco started smoking 41 years ago , in remission quit many years ago  ETOH:   reports current alcohol use. Exposures: There  is not history of TB or TB exposure. There is not asbestos or silica dust exposure. The patient reports does not have coal, foundry, quarry or [employer] exposure. Recent travel history none. There is not  history of recreational or IV drug use. There is not hot tub exposure. The patient does not have any exotic pets, turtles or exotic birds.      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     vaccines:       Immunization History   Administered Date(s) Administered    COVID-19, [employer], PF, 30mcg/0.3mL 02/27/2021, 03/23/2021        Home Meds: Medications Prior to Admission: gemfibrozil (LOPID) 600 MG tablet, Take 600 mg by mouth 2 times daily (before meals)  atorvastatin (LIPITOR) 40 MG tablet, TAKE 1 TABLET BY MOUTH EVERY DAY  allopurinol (ZYLOPRIM) 100 MG tablet, TAKE 1 TABLET BY MOUTH EVERY DAY  aspirin EC 81 MG EC tablet, Take 81 mg by mouth daily  magnesium gluconate (MAGONATE) 500 MG tablet, Take 500 mg by mouth daily   VASCEPA 1 G CAPS capsule, TAKE TWO CAPSULES TWO TIMES A DAY  vitamin D (CHOLECALCIFEROL) 1000 UNIT TABS tablet, Take 4,000 Units by mouth daily   Multiple Vitamins-Minerals (THERAPEUTIC MULTIVITAMIN-MINERALS) tablet, Take 1 tablet by mouth daily  colchicine (COLCRYS) 0.6 MG tablet, Take 1 tablet by mouth daily for 14 days 1 tab po qd (Patient not taking: Reported on 11/3/2020)  etodolac (LODINE) 400 MG tablet, Take 1 tablet by mouth 2 times daily  Blood Pressure KIT, Check BP  Once daily  ibuprofen (IBU) 600 MG tablet, Take 1 tablet by mouth every 8 hours as needed for Pain Take with food. psyllium (KONSYL) 28.3 % PACK, Take 1 packet by mouth daily  [DISCONTINUED] gemfibrozil (LOPID) 600 MG tablet, TAKE 1 TABLET BY MOUTH TWICE A DAY  [DISCONTINUED] lisinopril-hydrochlorothiazide (PRINZIDE;ZESTORETIC) 10-12.5 MG per tablet, Take 1 tablet by mouth every other day   [DISCONTINUED] fenofibrate (TRICOR) 145 MG tablet, Take 145 mg by mouth daily. CURRENT MEDS :  Scheduled Meds:   allopurinol  100 mg Oral Daily    aspirin EC  81 mg Oral Daily    atorvastatin  40 mg Oral Daily    gemfibrozil  600 mg Oral BID    therapeutic multivitamin-minerals  1 tablet Oral Daily    Vitamin D  4,000 Units Oral Daily    sodium chloride flush  5-40 mL IntraVENous 2 times per day    enoxaparin  30 mg SubCUTAneous BID    dexamethasone  6 mg IntraVENous Q24H       Continuous Infusions:   sodium chloride         No Known Allergies    REVIEW OF SYSTEMS:  Constitutional: Denies weight loss, night sweats, positive for general weakness and fatigue, fevers  Skin: Denies pigmentation, dark lesions, and rashes   HEENT: Denies hearing loss, tinnitus, ear drainage, epistaxis, sore throat, and hoarseness. Cardiovascular: Denies palpitations, chest pain, and chest pressure.   Respiratory: Cough shortness of breath  Gastrointestinal: Diarrhea, decreased appetite  Genitourinary: Denies dysuria, frequency, urgency or hematuria  Musculoskeletal: Arthralgias and myalgias  Neurological: Denies dizziness, vertigo, headache, and focal weakness  Psychological: Denies anxiety and depression  Endocrine: Denies heat intolerance and cold intolerance  Hematopoietic/Lymphatic: Denies bleeding problems and blood transfusions    OBJECTIVE:   /78   Pulse 80   Temp 98.4 °F (36.9 °C) (Oral)   Resp 18   Ht 5' 5\" (1.651 m)   Wt 180 lb (81.6 kg)   SpO2 91% Comment: up to bathroom  BMI 29.95 kg/m²   SpO2 Readings from Last 1 Encounters:   08/31/21 91%        I/O:    Intake/Output Summary (Last 24 hours) at 8/31/2021 1359  Last data filed at 8/31/2021 1222  Gross per 24 hour   Intake 480 ml   Output --   Net 480 ml     Vent Information  SpO2: 91 % (up to bathroom)                Physical Exam:  General: The patient is lying in bed comfortably without any distress. Breathing is not labored  HEENT: Pupils are equal round and reactive to light, there are no oral lesions and no post-nasal drip   Neck: supple without adenopathy  Cardiovascular: regular rate and rhythm without murmur or gallop  Respiratory: Clear to auscultation bilaterally without wheezing or crackles. Air entry is symmetric  Abdomen: soft, non-tender, non-distended, normal bowel sounds  Extremities: warm, no edema, no clubbing  Skin: no rash or lesion  Neurologic: CN II-XII grossly intact, no focal deficits    Pulmonary Function Testing none on file      Imaging personally reviewed:  Impression   No evidence of pulmonary embolism.       Bilateral ground-glass to slightly greater than ground-glass density   infiltrates, without pleural effusion, bronchial wall thickening or   lymphadenopathy.       Appearance is consistent with bilateral multifocal pneumonia, with global   appearance and constellation of findings favoring COVID pneumonia.       Prominent 14 mm nonobstructing left nephrolith.             Echo:  2017  Summary   Left ventricular size is grossly normal.   Normal systolic function with LVEF estimated at 65%. There is doppler evidence of stage I diastolic dysfunction. Normal left atrium by volume index(29ml/m2). Normal estimated PA pressures.              Labs:  Lab Results   Component Value Date    WBC 7.3 08/31/2021    HGB 15.0 08/31/2021    HCT 45.1 08/31/2021    MCV 87.9 08/31/2021    MCH 29.2 08/31/2021    MCHC 33.3 08/31/2021    RDW 12.5 08/31/2021     08/31/2021    MPV 10.6 08/31/2021     Lab Results   Component Value Date     08/30/2021    K 3.6 08/30/2021    CL 98 08/30/2021    CO2 26 08/30/2021    BUN 17 08/30/2021    CREATININE 0.9 08/30/2021    LABALBU 4.1 08/30/2021    CALCIUM 9.3 08/30/2021    GFRAA >60 08/30/2021    LABGLOM >60 08/30/2021     Lab Results   Component Value Date    PROTIME 13.1 08/30/2021    INR 1.2 08/30/2021     No results for input(s): PROBNP in the last 72 hours. No results for input(s): TROPONINI in the last 72 hours. Recent Labs     08/31/21  0955   PROCAL 0.06     This SmartLink has not been configured with any valid records. Micro:  No results for input(s): CULTRESP in the last 72 hours. No results for input(s): LABGRAM in the last 72 hours. No results for input(s): LEGUR in the last 72 hours. No results for input(s): STREPNEUMAGU in the last 72 hours. No results for input(s): LP1UAG in the last 72 hours. Assessment:  1. COVID-19 pneumonia, previously vaccinated with Carrillo Peter x2 doses  2. Acute respiratory insufficiency with hypoxia on exertion  3. Mild lymphopenia related to viral infection  4. Hypertension  5. Paroxysmal A. Fib  6. Elevated V-ftitn-khqtyk related to viral infection  7. History of testicular cancer    Plan:  1. Check ambulatory O2 levels with exertion, currently off oxygen. Monitor to keep saturation levels greater than 90%  2. Continue dexamethasone 6 mg IV daily, add vitamins. Will trend CRP if continues to rise will consider tocilizumab. Monitor for any new fevers. 3. CT chest reviewed with evidence of multifocal pneumonia. 4. Low procalcitonin reassuring. Imaging not impressive for superimposed bacterial infection. Strep pneumo and Legionella urine antigen pending. 5. Trend D-dimer continue Lovenox 30 mg subcu twice daily  6. Tessalon 3 times daily for cough  7. Albuterol as needed for rescue  8. Follow chest x-ray serially  9. Incentive spirometer, recruitment techniques  10.  GI prophylaxis-on Protonix    Thank you for allowing me to participate in the care of Jon Navarro. Please feel free to call with questions. This plan of care was reviewed in collaboration with Dr. Simon Shetty    Electronically signed by SUSY Nunes CNP on 8/31/2021 at 1:59 PM      Note: This report was completed utilizing computer voice recognition software. Every effort has been made to ensure accuracy, however; inadvertent computerized transcription errors may be present      I personally saw, examined, and cared for the patient. Labs, medications, radiographs reviewed. I agree with history exam and plans detailed in NP note. Continue steroids. Possible need for tocilizumab pending inflammatory markers and clinical course.     Shaye Jefferson, DO

## 2021-09-01 LAB
ALBUMIN SERPL-MCNC: 3.7 G/DL (ref 3.5–5.2)
ALP BLD-CCNC: 135 U/L (ref 40–129)
ALT SERPL-CCNC: 66 U/L (ref 0–40)
ANION GAP SERPL CALCULATED.3IONS-SCNC: 11 MMOL/L (ref 7–16)
AST SERPL-CCNC: 48 U/L (ref 0–39)
BILIRUB SERPL-MCNC: 0.6 MG/DL (ref 0–1.2)
BUN BLDV-MCNC: 18 MG/DL (ref 6–23)
C-REACTIVE PROTEIN: 8.2 MG/DL (ref 0–0.4)
CALCIUM SERPL-MCNC: 9.3 MG/DL (ref 8.6–10.2)
CHLORIDE BLD-SCNC: 103 MMOL/L (ref 98–107)
CO2: 25 MMOL/L (ref 22–29)
CREAT SERPL-MCNC: 0.7 MG/DL (ref 0.7–1.2)
D DIMER: 1054 NG/ML DDU
GFR AFRICAN AMERICAN: >60
GFR NON-AFRICAN AMERICAN: >60 ML/MIN/1.73
GLUCOSE BLD-MCNC: 140 MG/DL (ref 74–99)
HCT VFR BLD CALC: 46.6 % (ref 37–54)
HEMOGLOBIN: 15.4 G/DL (ref 12.5–16.5)
L. PNEUMOPHILA SEROGP 1 UR AG: NORMAL
MCH RBC QN AUTO: 29.2 PG (ref 26–35)
MCHC RBC AUTO-ENTMCNC: 33 % (ref 32–34.5)
MCV RBC AUTO: 88.4 FL (ref 80–99.9)
PDW BLD-RTO: 12.8 FL (ref 11.5–15)
PLATELET # BLD: 313 E9/L (ref 130–450)
PMV BLD AUTO: 10.5 FL (ref 7–12)
POTASSIUM REFLEX MAGNESIUM: 4.3 MMOL/L (ref 3.5–5)
PROCALCITONIN: 0.06 NG/ML (ref 0–0.08)
RBC # BLD: 5.27 E12/L (ref 3.8–5.8)
SODIUM BLD-SCNC: 139 MMOL/L (ref 132–146)
STREP PNEUMONIAE ANTIGEN, URINE: NORMAL
TOTAL PROTEIN: 7.1 G/DL (ref 6.4–8.3)
WBC # BLD: 12.9 E9/L (ref 4.5–11.5)

## 2021-09-01 PROCEDURE — 80053 COMPREHEN METABOLIC PANEL: CPT

## 2021-09-01 PROCEDURE — XW033H5 INTRODUCTION OF TOCILIZUMAB INTO PERIPHERAL VEIN, PERCUTANEOUS APPROACH, NEW TECHNOLOGY GROUP 5: ICD-10-PCS | Performed by: INTERNAL MEDICINE

## 2021-09-01 PROCEDURE — 6360000002 HC RX W HCPCS: Performed by: INTERNAL MEDICINE

## 2021-09-01 PROCEDURE — 6370000000 HC RX 637 (ALT 250 FOR IP): Performed by: INTERNAL MEDICINE

## 2021-09-01 PROCEDURE — 85378 FIBRIN DEGRADE SEMIQUANT: CPT

## 2021-09-01 PROCEDURE — 84145 PROCALCITONIN (PCT): CPT

## 2021-09-01 PROCEDURE — 6370000000 HC RX 637 (ALT 250 FOR IP): Performed by: NURSE PRACTITIONER

## 2021-09-01 PROCEDURE — 6370000000 HC RX 637 (ALT 250 FOR IP): Performed by: STUDENT IN AN ORGANIZED HEALTH CARE EDUCATION/TRAINING PROGRAM

## 2021-09-01 PROCEDURE — 99232 SBSQ HOSP IP/OBS MODERATE 35: CPT | Performed by: STUDENT IN AN ORGANIZED HEALTH CARE EDUCATION/TRAINING PROGRAM

## 2021-09-01 PROCEDURE — 86140 C-REACTIVE PROTEIN: CPT

## 2021-09-01 PROCEDURE — 2500000003 HC RX 250 WO HCPCS: Performed by: INTERNAL MEDICINE

## 2021-09-01 PROCEDURE — 36415 COLL VENOUS BLD VENIPUNCTURE: CPT

## 2021-09-01 PROCEDURE — 2580000003 HC RX 258: Performed by: INTERNAL MEDICINE

## 2021-09-01 PROCEDURE — XW033E5 INTRODUCTION OF REMDESIVIR ANTI-INFECTIVE INTO PERIPHERAL VEIN, PERCUTANEOUS APPROACH, NEW TECHNOLOGY GROUP 5: ICD-10-PCS | Performed by: INTERNAL MEDICINE

## 2021-09-01 PROCEDURE — 2060000000 HC ICU INTERMEDIATE R&B

## 2021-09-01 PROCEDURE — 85027 COMPLETE CBC AUTOMATED: CPT

## 2021-09-01 RX ORDER — DOCUSATE SODIUM 100 MG/1
100 CAPSULE, LIQUID FILLED ORAL DAILY
Status: DISCONTINUED | OUTPATIENT
Start: 2021-09-01 | End: 2021-09-04 | Stop reason: HOSPADM

## 2021-09-01 RX ORDER — 0.9 % SODIUM CHLORIDE 0.9 %
30 INTRAVENOUS SOLUTION INTRAVENOUS PRN
Status: DISCONTINUED | OUTPATIENT
Start: 2021-09-01 | End: 2021-09-04 | Stop reason: HOSPADM

## 2021-09-01 RX ORDER — LANOLIN ALCOHOL/MO/W.PET/CERES
3 CREAM (GRAM) TOPICAL NIGHTLY
Status: DISCONTINUED | OUTPATIENT
Start: 2021-09-01 | End: 2021-09-04 | Stop reason: HOSPADM

## 2021-09-01 RX ADMIN — ATORVASTATIN CALCIUM 40 MG: 40 TABLET, FILM COATED ORAL at 21:03

## 2021-09-01 RX ADMIN — SODIUM CHLORIDE, PRESERVATIVE FREE 10 ML: 5 INJECTION INTRAVENOUS at 10:12

## 2021-09-01 RX ADMIN — MULTIPLE VITAMINS W/ MINERALS TAB 1 TABLET: TAB at 10:10

## 2021-09-01 RX ADMIN — ALBUTEROL SULFATE 2 PUFF: 108 AEROSOL, METERED RESPIRATORY (INHALATION) at 21:14

## 2021-09-01 RX ADMIN — GEMFIBROZIL 600 MG: 600 TABLET ORAL at 16:18

## 2021-09-01 RX ADMIN — ASPIRIN 81 MG: 81 TABLET, COATED ORAL at 10:10

## 2021-09-01 RX ADMIN — GEMFIBROZIL 600 MG: 600 TABLET ORAL at 10:10

## 2021-09-01 RX ADMIN — ENOXAPARIN SODIUM 30 MG: 30 INJECTION SUBCUTANEOUS at 10:13

## 2021-09-01 RX ADMIN — ALLOPURINOL 100 MG: 100 TABLET ORAL at 10:10

## 2021-09-01 RX ADMIN — ENOXAPARIN SODIUM 30 MG: 30 INJECTION SUBCUTANEOUS at 21:02

## 2021-09-01 RX ADMIN — TOCILIZUMAB 648 MG: 180 INJECTION, SOLUTION SUBCUTANEOUS at 17:36

## 2021-09-01 RX ADMIN — ALBUTEROL SULFATE 2 PUFF: 108 AEROSOL, METERED RESPIRATORY (INHALATION) at 10:14

## 2021-09-01 RX ADMIN — REMDESIVIR 200 MG: 100 INJECTION, POWDER, LYOPHILIZED, FOR SOLUTION INTRAVENOUS at 21:11

## 2021-09-01 RX ADMIN — OXYCODONE HYDROCHLORIDE AND ACETAMINOPHEN 500 MG: 500 TABLET ORAL at 10:10

## 2021-09-01 RX ADMIN — BENZONATATE 200 MG: 100 CAPSULE ORAL at 12:34

## 2021-09-01 RX ADMIN — ALBUTEROL SULFATE 2 PUFF: 108 AEROSOL, METERED RESPIRATORY (INHALATION) at 17:39

## 2021-09-01 RX ADMIN — GUAIFENESIN AND DEXTROMETHORPHAN 5 ML: 100; 10 SYRUP ORAL at 10:11

## 2021-09-01 RX ADMIN — Medication 3 MG: at 21:02

## 2021-09-01 RX ADMIN — Medication 4000 UNITS: at 12:34

## 2021-09-01 RX ADMIN — DEXAMETHASONE SODIUM PHOSPHATE 6 MG: 4 INJECTION, SOLUTION INTRA-ARTICULAR; INTRALESIONAL; INTRAMUSCULAR; INTRAVENOUS; SOFT TISSUE at 21:28

## 2021-09-01 RX ADMIN — PANTOPRAZOLE SODIUM 40 MG: 40 TABLET, DELAYED RELEASE ORAL at 05:17

## 2021-09-01 RX ADMIN — DOCUSATE SODIUM 100 MG: 100 CAPSULE, LIQUID FILLED ORAL at 16:17

## 2021-09-01 RX ADMIN — ZINC SULFATE 220 MG (50 MG) CAPSULE 50 MG: CAPSULE at 12:34

## 2021-09-01 RX ADMIN — BENZONATATE 200 MG: 100 CAPSULE ORAL at 21:02

## 2021-09-01 ASSESSMENT — PAIN SCALES - GENERAL: PAINLEVEL_OUTOF10: 0

## 2021-09-01 NOTE — PROGRESS NOTES
Patient ambulated on room air to bathroom and SpO2 dropped to 84%. Patient recovered to 95% on 2 LNC. Updated Dr. Avel Sy. Patient given incentive spirometer and educated on how to use. Educated on prone positioning and states understanding.

## 2021-09-01 NOTE — PROGRESS NOTES
-suggesting no recent or current infections  with Legionella pneumophila serogroup 1. Infection to Legionella cannot be ruled out since other serogroups  and species may cause infection, antigen may not be present in  early infection, or level of antigen may be below the  detection limit. Normal Range: Presumptive Negative            Assessment:    1. COVID-19 pneumonia, previously vaccinated with Carrillo Peter x2 doses  2. Acute respiratory insufficiency with hypoxia on exertion  3. Mild lymphopenia related to viral infection  4. Hypertension  5. Paroxysmal A. Fib  6. Elevated P-vgjvb-tnlole related to viral infection  7. History of testicular cancer      Plan:   8. Monitor off oxygen, patient currently on room air, keep SPO2 greater than 90%. Patient will need ambulatory pulse ox checked prior to discharge  9. Continue dexamethasone 6 mg IV daily and vitamin therapy. Will trend CRP if continues to rise will consider tocilizumab. Monitor for any new fevers. CRP remains the same at 10.3, pending for today. Patient remains afebrile to need to hold off Toci at this time  10. Bacterial antigens negative  11. Respiratory culture pending  12. Trend D-dimer, continue Lovenox 30 mg subcu twice daily. 1003->1054  13. Tessalon 3 times daily for cough  14. Albuterol as needed for rescue  15. Increase activity as tolerated  16. Robitussin as needed  17. Follow chest x-ray serially  18. Encourage incentive spirometer, recruitment techniques, self proning, side-lying  19. GI prophylaxis      This plan of care was reviewed in collaboration with Dr. Dallas Garcia  Electronically signed by SUSY Cueto CNP on 9/1/2021 at 1:02 PM      I personally saw, examined, and cared for the patient. Labs, medications, radiographs reviewed. I agree with history exam and plans detailed in NP note. Patient having desaturation with exertion. Start remdesivir.     Yuridia Nett, DO

## 2021-09-01 NOTE — PLAN OF CARE
Problem: Airway Clearance - Ineffective  Goal: Achieve or maintain patent airway  Outcome: Met This Shift     Problem: Gas Exchange - Impaired  Goal: Absence of hypoxia  Outcome: Met This Shift  Goal: Promote optimal lung function  Outcome: Met This Shift     Problem: Risk for Fluid Volume Deficit  Goal: Maintain normal heart rhythm  Outcome: Met This Shift

## 2021-09-01 NOTE — PROGRESS NOTES
[facility] Progress Note    Admitting Date and Time: 8/30/2021  7:54 PM  Admit Dx: Acute respiratory failure with hypoxia ([facility] [address] [J96.01]  COVID-19 [U07.1]    Subjective:  Patient is being followed for Acute respiratory failure with hypoxia ([facility] [address] [J96.01]  COVID-19 [U07.1]   Pt feels short of breath on ambulation improved from yesterday, reports cough. Per RN: No major concerns, remains afebrile. ROS: denies fever, chills, cp, sob, n/v, HA unless stated above.       melatonin  3 mg Oral Nightly    docusate sodium  100 mg Oral Daily    tocilizumab (ACTEMRA) IVPB  648 mg IntraVENous Once    remdesivir IVPB  200 mg IntraVENous Once    Followed by   Davon Jarrell ON 9/2/2021] remdesivir IVPB  100 mg IntraVENous Q24H    allopurinol  100 mg Oral Daily    aspirin EC  81 mg Oral Daily    atorvastatin  40 mg Oral Daily    gemfibrozil  600 mg Oral BID    therapeutic multivitamin-minerals  1 tablet Oral Daily    Vitamin D  4,000 Units Oral Daily    sodium chloride flush  5-40 mL IntraVENous 2 times per day    enoxaparin  30 mg SubCUTAneous BID    dexamethasone  6 mg IntraVENous Q24H    pantoprazole  40 mg Oral QAM AC    ascorbic acid  500 mg Oral Daily    zinc sulfate  50 mg Oral Daily    benzonatate  200 mg Oral TID    albuterol sulfate HFA  2 puff Inhalation 4x daily     sodium chloride, 30 mL, PRN  sodium chloride flush, 5-40 mL, PRN  sodium chloride, 25 mL, PRN  ondansetron, 4 mg, Q8H PRN   Or  ondansetron, 4 mg, Q6H PRN  polyethylene glycol, 17 g, Daily PRN  guaiFENesin-dextromethorphan, 5 mL, Q4H PRN  acetaminophen, 650 mg, Q6H PRN   Or  acetaminophen, 650 mg, Q6H PRN       Objective:    /78   Pulse 70   Temp 97.1 °F (36.2 °C) (Oral)   Resp 16   Ht 5' 5\" (1.651 m)   Wt 178 lb 3.2 oz (80.8 kg)   SpO2 92%   BMI 29.65 kg/m²   Due to the patient's COVID-19-positive status, to reduce exposure, contamination, and in an effort to conserve PPE, this patient was evaluated through a combination of review of the medical record, nursing assessments, other physicians' exams and assessments, as well as telemedicine interaction. Recent Labs     08/30/21 2042 09/01/21  0946    139   K 3.6 4.3   CL 98 103   CO2 26 25   BUN 17 18   CREATININE 0.9 0.7   GLUCOSE 103* 140*   CALCIUM 9.3 9.3       Recent Labs     08/30/21 2042 08/31/21  0955 09/01/21  0946   WBC 10.7 7.3 12.9*   RBC 5.60 5.13 5.27   HGB 16.6* 15.0 15.4   HCT 49.7 45.1 46.6   MCV 88.8 87.9 88.4   MCH 29.6 29.2 29.2   MCHC 33.4 33.3 33.0   RDW 12.6 12.5 12.8    251 313   MPV 10.2 10.6 10.5       Micro:  No components found for: [facility])    Radiology:   XR CHEST PORTABLE    Result Date: 8/30/2021  EXAMINATION: ONE XRAY VIEW OF THE CHEST 8/30/2021 2:27 pm COMPARISON: 22 February 2020 HISTORY: ORDERING SYSTEM PROVIDED HISTORY: covid+ TECHNOLOGIST PROVIDED HISTORY: Reason for exam:->covid+ FINDINGS: There is pneumonic infiltrate in the left mid to lower lung and I suspect subtly in the right mid to upper lung. The findings are compatible with COVID-19. Heart and pulmonary vascularity are normal.  Neither costophrenic angle is blunted. Pneumonia which is likely bilateral.  The findings are compatible with COVID-19. CTA PULMONARY W CONTRAST    Result Date: 8/30/2021  EXAMINATION: CTA OF THE CHEST 8/30/2021 9:58 pm TECHNIQUE: CTA of the chest was performed after the administration of intravenous contrast.  Multiplanar reformatted images are provided for review. MIP images are provided for review. Dose modulation, iterative reconstruction, and/or weight based adjustment of the mA/kV was utilized to reduce the radiation dose to as low as reasonably achievable. COMPARISON: No prior chest CT. Correlation is made with a two-view study from earlier today.  HISTORY: ORDERING SYSTEM PROVIDED HISTORY: r/o pe TECHNOLOGIST PROVIDED HISTORY: Reason for exam:->r/o pe Decision Support Exception - unselect if not a suspected or confirmed -resolved, secondary to COVID-19 pneumonia -CRP elevated, currently on room air  Chest x-ray on admission B/L pneumonic infiltrate. CTA chest with no evidence of PE. There are bilateral groundglass infiltrates without pleural effusions. Consistent with multifocal pneumonia. Lab puirrof-C-lgzye 1003, , fibrinogen > 700, CRP 10.3-now downtrending, pro-Keanu low. Consulted pulmonology for recommendations on remdesivir, Tocilizumab, see no current need, clinical improvement. Continuing dexamethasone, multivitamins. Incentive spirometry, weaning as tolerated, nebulizers. 2.  Hyperlipidemia    3. Chronic back pain    4. Hx of CVA -continue aspirin and statin    5. History of abnormal stress test    6. Paroxysmal A. Fib    7. Hypertension -was on lisinopril, hydrochlorothiazide, taking due to improved blood pressure, continue monitoring. 8.  S/p Pfizer COVID-19 vaccination - completed March 2021.     9.  Former smoker      DVT prophylaxis - Lovenox 30 twice daily  We will keep monitoring, if remains stable on room air, afebrile we will discharge soon. NOTE: This report was transcribed using voice recognition software. Every effort was made to ensure accuracy; however, inadvertent computerized transcription errors may be present.   Electronically signed by Jose Colon MD on 9/1/2021 at 5:36 PM

## 2021-09-02 LAB
ALBUMIN SERPL-MCNC: 3.6 G/DL (ref 3.5–5.2)
ALP BLD-CCNC: 129 U/L (ref 40–129)
ALT SERPL-CCNC: 52 U/L (ref 0–40)
ANION GAP SERPL CALCULATED.3IONS-SCNC: 15 MMOL/L (ref 7–16)
AST SERPL-CCNC: 30 U/L (ref 0–39)
BILIRUB SERPL-MCNC: 0.6 MG/DL (ref 0–1.2)
BUN BLDV-MCNC: 20 MG/DL (ref 6–23)
C-REACTIVE PROTEIN: 4.8 MG/DL (ref 0–0.4)
CALCIUM SERPL-MCNC: 9.3 MG/DL (ref 8.6–10.2)
CHLORIDE BLD-SCNC: 101 MMOL/L (ref 98–107)
CO2: 21 MMOL/L (ref 22–29)
CREAT SERPL-MCNC: 0.6 MG/DL (ref 0.7–1.2)
D DIMER: 904 NG/ML DDU
EKG ATRIAL RATE: 66 BPM
EKG P AXIS: 36 DEGREES
EKG P-R INTERVAL: 176 MS
EKG Q-T INTERVAL: 404 MS
EKG QRS DURATION: 80 MS
EKG QTC CALCULATION (BAZETT): 423 MS
EKG R AXIS: -12 DEGREES
EKG T AXIS: 25 DEGREES
EKG VENTRICULAR RATE: 66 BPM
GFR AFRICAN AMERICAN: >60
GFR NON-AFRICAN AMERICAN: >60 ML/MIN/1.73
GLUCOSE BLD-MCNC: 148 MG/DL (ref 74–99)
HCT VFR BLD CALC: 45.7 % (ref 37–54)
HEMOGLOBIN: 15.5 G/DL (ref 12.5–16.5)
MCH RBC QN AUTO: 29.8 PG (ref 26–35)
MCHC RBC AUTO-ENTMCNC: 33.9 % (ref 32–34.5)
MCV RBC AUTO: 87.9 FL (ref 80–99.9)
PDW BLD-RTO: 12.7 FL (ref 11.5–15)
PLATELET # BLD: 341 E9/L (ref 130–450)
PMV BLD AUTO: 10.4 FL (ref 7–12)
POTASSIUM REFLEX MAGNESIUM: 3.7 MMOL/L (ref 3.5–5)
RBC # BLD: 5.2 E12/L (ref 3.8–5.8)
SODIUM BLD-SCNC: 137 MMOL/L (ref 132–146)
TOTAL PROTEIN: 7.1 G/DL (ref 6.4–8.3)
WBC # BLD: 5.4 E9/L (ref 4.5–11.5)

## 2021-09-02 PROCEDURE — 85378 FIBRIN DEGRADE SEMIQUANT: CPT

## 2021-09-02 PROCEDURE — 85027 COMPLETE CBC AUTOMATED: CPT

## 2021-09-02 PROCEDURE — 99233 SBSQ HOSP IP/OBS HIGH 50: CPT | Performed by: STUDENT IN AN ORGANIZED HEALTH CARE EDUCATION/TRAINING PROGRAM

## 2021-09-02 PROCEDURE — 6360000002 HC RX W HCPCS: Performed by: INTERNAL MEDICINE

## 2021-09-02 PROCEDURE — 36415 COLL VENOUS BLD VENIPUNCTURE: CPT

## 2021-09-02 PROCEDURE — 2060000000 HC ICU INTERMEDIATE R&B

## 2021-09-02 PROCEDURE — 86140 C-REACTIVE PROTEIN: CPT

## 2021-09-02 PROCEDURE — 2580000003 HC RX 258: Performed by: INTERNAL MEDICINE

## 2021-09-02 PROCEDURE — 6370000000 HC RX 637 (ALT 250 FOR IP): Performed by: STUDENT IN AN ORGANIZED HEALTH CARE EDUCATION/TRAINING PROGRAM

## 2021-09-02 PROCEDURE — 2500000003 HC RX 250 WO HCPCS: Performed by: INTERNAL MEDICINE

## 2021-09-02 PROCEDURE — 2700000000 HC OXYGEN THERAPY PER DAY

## 2021-09-02 PROCEDURE — 6370000000 HC RX 637 (ALT 250 FOR IP): Performed by: NURSE PRACTITIONER

## 2021-09-02 PROCEDURE — 80053 COMPREHEN METABOLIC PANEL: CPT

## 2021-09-02 PROCEDURE — 93010 ELECTROCARDIOGRAM REPORT: CPT | Performed by: INTERNAL MEDICINE

## 2021-09-02 PROCEDURE — 6370000000 HC RX 637 (ALT 250 FOR IP): Performed by: INTERNAL MEDICINE

## 2021-09-02 PROCEDURE — 93005 ELECTROCARDIOGRAM TRACING: CPT | Performed by: STUDENT IN AN ORGANIZED HEALTH CARE EDUCATION/TRAINING PROGRAM

## 2021-09-02 RX ADMIN — ZINC SULFATE 220 MG (50 MG) CAPSULE 50 MG: CAPSULE at 08:21

## 2021-09-02 RX ADMIN — ENOXAPARIN SODIUM 30 MG: 30 INJECTION SUBCUTANEOUS at 08:19

## 2021-09-02 RX ADMIN — ATORVASTATIN CALCIUM 40 MG: 40 TABLET, FILM COATED ORAL at 21:29

## 2021-09-02 RX ADMIN — SODIUM CHLORIDE, PRESERVATIVE FREE 10 ML: 5 INJECTION INTRAVENOUS at 08:19

## 2021-09-02 RX ADMIN — DOCUSATE SODIUM 100 MG: 100 CAPSULE, LIQUID FILLED ORAL at 08:22

## 2021-09-02 RX ADMIN — GEMFIBROZIL 600 MG: 600 TABLET ORAL at 05:31

## 2021-09-02 RX ADMIN — OXYCODONE HYDROCHLORIDE AND ACETAMINOPHEN 500 MG: 500 TABLET ORAL at 08:22

## 2021-09-02 RX ADMIN — MULTIPLE VITAMINS W/ MINERALS TAB 1 TABLET: TAB at 08:22

## 2021-09-02 RX ADMIN — DEXAMETHASONE SODIUM PHOSPHATE 6 MG: 4 INJECTION, SOLUTION INTRA-ARTICULAR; INTRALESIONAL; INTRAMUSCULAR; INTRAVENOUS; SOFT TISSUE at 21:30

## 2021-09-02 RX ADMIN — SODIUM CHLORIDE, PRESERVATIVE FREE 10 ML: 5 INJECTION INTRAVENOUS at 21:30

## 2021-09-02 RX ADMIN — Medication 3 MG: at 21:29

## 2021-09-02 RX ADMIN — ALLOPURINOL 100 MG: 100 TABLET ORAL at 08:22

## 2021-09-02 RX ADMIN — REMDESIVIR 100 MG: 100 INJECTION, POWDER, LYOPHILIZED, FOR SOLUTION INTRAVENOUS at 21:30

## 2021-09-02 RX ADMIN — BENZONATATE 200 MG: 100 CAPSULE ORAL at 08:21

## 2021-09-02 RX ADMIN — ALBUTEROL SULFATE 2 PUFF: 108 AEROSOL, METERED RESPIRATORY (INHALATION) at 21:30

## 2021-09-02 RX ADMIN — ALBUTEROL SULFATE 2 PUFF: 108 AEROSOL, METERED RESPIRATORY (INHALATION) at 12:59

## 2021-09-02 RX ADMIN — ALBUTEROL SULFATE 2 PUFF: 108 AEROSOL, METERED RESPIRATORY (INHALATION) at 17:19

## 2021-09-02 RX ADMIN — PANTOPRAZOLE SODIUM 40 MG: 40 TABLET, DELAYED RELEASE ORAL at 05:31

## 2021-09-02 RX ADMIN — ASPIRIN 81 MG: 81 TABLET, COATED ORAL at 08:22

## 2021-09-02 RX ADMIN — BENZONATATE 200 MG: 100 CAPSULE ORAL at 13:00

## 2021-09-02 RX ADMIN — ENOXAPARIN SODIUM 30 MG: 30 INJECTION SUBCUTANEOUS at 21:29

## 2021-09-02 RX ADMIN — BENZONATATE 200 MG: 100 CAPSULE ORAL at 21:29

## 2021-09-02 RX ADMIN — GEMFIBROZIL 600 MG: 600 TABLET ORAL at 17:20

## 2021-09-02 RX ADMIN — ALBUTEROL SULFATE 2 PUFF: 108 AEROSOL, METERED RESPIRATORY (INHALATION) at 08:24

## 2021-09-02 RX ADMIN — Medication 4000 UNITS: at 08:21

## 2021-09-02 ASSESSMENT — PAIN SCALES - GENERAL
PAINLEVEL_OUTOF10: 0
PAINLEVEL_OUTOF10: 0

## 2021-09-02 NOTE — PROGRESS NOTES
Matt Will M.D.,[facility]  Jesus Estrada D.O., F.A.C.O.I., Manny Cardenas M.D. Jennifer Jaimes M.D. Mimi Weathers D.O. Daily Pulmonary Progress Note    Patient:  Addie Floyd 78 y.o. male MRN: [id number]     Date of Service: 9/2/2021      Synopsis     We are following patient for Covid-19 pneumonia, CRP elevated, not on oxygen, remdesivir recommendation    \"CC\" cough, shortness of breath    Code status: Full      Subjective      Patient was seen and examined lying in bed in no apparent distress. He is currently on 2L oxygen and is using the incentive spirometer. He has an occasional cough with clear sputum. Review of Systems:  Constitutional: Denies fever, weight loss, night sweats, and fatigue  Skin: Denies pigmentation, dark lesions, and rashes   HEENT: Denies hearing loss, tinnitus, ear drainage, epistaxis, sore throat, and hoarseness. Cardiovascular: Denies palpitations, chest pain, and chest pressure. Respiratory: positive cough- improving, dyspnea at rest, hemoptysis, apnea, and choking. Dyspnea on exertion, chest tightness with deep breaths.   Gastrointestinal: Denies nausea, vomiting, poor appetite, diarrhea, heartburn or reflux  Genitourinary: Denies dysuria, frequency, urgency or hematuria  Musculoskeletal: Denies myalgias, muscle weakness, and bone pain  Neurological: Denies dizziness, vertigo, headache, and focal weakness  Psychological: Denies anxiety and depression  Endocrine: Denies heat intolerance and cold intolerance  Hematopoietic/Lymphatic: Denies bleeding problems and blood transfusions    24-hour events:  None    Objective   Vitals: /76   Pulse 60   Temp 97.5 °F (36.4 °C) (Oral)   Resp 16   Ht 5' 5\" (1.651 m)   Wt 176 lb (79.8 kg)   SpO2 93%   BMI 29.29 kg/m²     I/O:    Intake/Output Summary (Last 24 hours) at 9/2/2021 1422  Last data filed at 9/2/2021 1144  Gross per 24 hour   Intake 420 ml   Output 350 ml   Net 70 ml       Vent subtly in the right mid to upper lung.  The findings are compatible with   COVID-19.  Heart and pulmonary vascularity are normal.  Neither costophrenic   angle is blunted. CTA pulmonary with contrast 8/30  No evidence of pulmonary embolism.       Bilateral ground-glass to slightly greater than ground-glass density   infiltrates, without pleural effusion, bronchial wall thickening or   lymphadenopathy.       Appearance is consistent with bilateral multifocal pneumonia, with global   appearance and constellation of findings favoring COVID pneumonia.       Prominent 14 mm nonobstructing left nephrolith         ECHO 4/19/2017   Summary   Left ventricular size is grossly normal.   Normal systolic function with LVEF estimated at 65%. There is doppler evidence of stage I diastolic dysfunction. Normal left atrium by volume index(29ml/m2). Normal estimated PA pressures. Signature      ----------------------------------------------------------------   Electronically signed by Yareli Lindquist MD(Interpreting   physician) on 04/19/2017 04:47 PM      Labs:  Lab Results   Component Value Date    WBC 5.4 09/02/2021    HGB 15.5 09/02/2021    HCT 45.7 09/02/2021    MCV 87.9 09/02/2021    MCH 29.8 09/02/2021    MCHC 33.9 09/02/2021    RDW 12.7 09/02/2021     09/02/2021    MPV 10.4 09/02/2021     Lab Results   Component Value Date     09/02/2021    K 3.7 09/02/2021     09/02/2021    CO2 21 09/02/2021    BUN 20 09/02/2021    CREATININE 0.6 09/02/2021    LABALBU 3.6 09/02/2021    CALCIUM 9.3 09/02/2021    GFRAA >60 09/02/2021    LABGLOM >60 09/02/2021     Lab Results   Component Value Date    PROTIME 13.1 08/30/2021    INR 1.2 08/30/2021     No results for input(s): PROBNP in the last 72 hours. Recent Labs     09/01/21  0946   PROCAL 0.06     This SmartLink has not been configured with any valid records.        Micro:  8/31 Covid positive  8/31 respiratory panel negative  8/31 respiratory culture pending  Recent Labs     08/31/21 2045   CULTRESP Oral Pharyngeal Carline present     No results for input(s): LABGRAM in the last 72 hours. No results for input(s): LEGUR in the last 72 hours. Recent Labs     08/31/21 2040   STREPNEUMAGU Presumptive negative- suggests no current or recent  pneumococcal infection. Infection due to Strep pneumoniae cannot be  ruled out since the antigen present in the sample  may be below the detection limit of the test.  Normal Range:Presumptive Negative       Recent Labs     08/31/21 2040   LP1UAG Presumptive Negative -suggesting no recent or current infections  with Legionella pneumophila serogroup 1. Infection to Legionella cannot be ruled out since other serogroups  and species may cause infection, antigen may not be present in  early infection, or level of antigen may be below the  detection limit. Normal Range: Presumptive Negative            Assessment:    1. COVID-19 pneumonia, previously vaccinated with Carrillo Peter x2 doses  2. Acute respiratory insufficiency with hypoxia on exertion  3. Mild lymphopenia related to viral infection  4. Hypertension  5. Paroxysmal A. Fib  6. Elevated V-lwoqk-idackg related to viral infection  7. History of testicular cancer      Plan:   1. Monitor off oxygen, patient currently on 2L oxygen, keep SPO2 greater than 90%. Patient will need ambulatory pulse ox checked prior to discharge  2. Continue dexamethasone 6 mg IV daily and vitamin therapy. CRP trending down to 4.8 today. Monitor for any new fevers. Received Tocilizumab  3. Bacterial antigens negative  4. Respiratory culture pending: abundant gm+ cocci in chains, moderate gm+ cocci in clusters  5. Trend D-dimer, continue Lovenox 30 mg subcu twice daily. 1003->1054  6. Tessalon 3 times daily for cough  7. Albuterol as needed for rescue  8. Increase activity as tolerated  9. Robitussin as needed  10. Follow chest x-ray serially  11.  Encourage incentive spirometer, recruitment techniques, self proning, side-lying  12. GI prophylaxis      This plan of care was reviewed in collaboration with [address] [place] [address]  Electronically signed by SUSY Ortega CNP on 9/2/2021 at 2:22 PM        I personally saw, examined, and cared for the patient. Labs, medications, radiographs reviewed. I agree with history exam and plans detailed in NP note. Clinically improving. Received tocilizumab. Started on remdesivir. If continues to do well possible discharge in next 1-2 days. Updated daughter extensively on the phone.     Silverio Keepers, DO

## 2021-09-02 NOTE — PROGRESS NOTES
[facility] Progress Note    Admitting Date and Time: 8/30/2021  7:54 PM  Admit Dx: Acute respiratory failure with hypoxia ([address] [J96.01]  COVID-19 [U07.1]    Subjective:  Patient is being followed for Acute respiratory failure with hypoxia ([facility] [address] [J96.01]  COVID-19 [U07.1]   Pt feels short of breath on ambulation improved from yesterday, reports some bleeding in stool, reports has hemorrhoids with occasional bleeding in the past.  Per RN: Patient desaturated last night, also had bradycardia (not documented in the charting, per the patient his heart rate was in 50s for one time that he woke up to go to the bathroom), remains afebrile. ROS: denies fever, chills, cp, sob, n/v, HA unless stated above.       melatonin  3 mg Oral Nightly    docusate sodium  100 mg Oral Daily    remdesivir IVPB  100 mg IntraVENous Q24H    allopurinol  100 mg Oral Daily    aspirin EC  81 mg Oral Daily    atorvastatin  40 mg Oral Daily    gemfibrozil  600 mg Oral BID    therapeutic multivitamin-minerals  1 tablet Oral Daily    Vitamin D  4,000 Units Oral Daily    sodium chloride flush  5-40 mL IntraVENous 2 times per day    enoxaparin  30 mg SubCUTAneous BID    dexamethasone  6 mg IntraVENous Q24H    pantoprazole  40 mg Oral QAM AC    ascorbic acid  500 mg Oral Daily    zinc sulfate  50 mg Oral Daily    benzonatate  200 mg Oral TID    albuterol sulfate HFA  2 puff Inhalation 4x daily     sodium chloride, 30 mL, PRN  sodium chloride flush, 5-40 mL, PRN  sodium chloride, 25 mL, PRN  ondansetron, 4 mg, Q8H PRN   Or  ondansetron, 4 mg, Q6H PRN  polyethylene glycol, 17 g, Daily PRN  guaiFENesin-dextromethorphan, 5 mL, Q4H PRN  acetaminophen, 650 mg, Q6H PRN   Or  acetaminophen, 650 mg, Q6H PRN       Objective:    /70   Pulse 65   Temp 97.6 °F (36.4 °C) (Oral)   Resp 16   Ht 5' 5\" (1.651 m)   Wt 176 lb (79.8 kg)   SpO2 93%   BMI 29.29 kg/m²   Due to the patient's COVID-19-positive status, to reduce exposure, contamination, and in an effort to conserve PPE, this patient was evaluated through a combination of review of the medical record, nursing assessments, other physicians' exams and assessments, as well as telemedicine interaction. Recent Labs     08/30/21 2042 09/01/21  0946    139   K 3.6 4.3   CL 98 103   CO2 26 25   BUN 17 18   CREATININE 0.9 0.7   GLUCOSE 103* 140*   CALCIUM 9.3 9.3       Recent Labs     08/31/21  0955 09/01/21  0946 09/02/21  0845   WBC 7.3 12.9* 5.4   RBC 5.13 5.27 5.20   HGB 15.0 15.4 15.5   HCT 45.1 46.6 45.7   MCV 87.9 88.4 87.9   MCH 29.2 29.2 29.8   MCHC 33.3 33.0 33.9   RDW 12.5 12.8 12.7    313 341   MPV 10.6 10.5 10.4       Micro:  No components found for: [facility])    Radiology:   XR CHEST PORTABLE    Result Date: 8/30/2021  EXAMINATION: ONE XRAY VIEW OF THE CHEST 8/30/2021 2:27 pm COMPARISON: 22 February 2020 HISTORY: ORDERING SYSTEM PROVIDED HISTORY: covid+ TECHNOLOGIST PROVIDED HISTORY: Reason for exam:->covid+ FINDINGS: There is pneumonic infiltrate in the left mid to lower lung and I suspect subtly in the right mid to upper lung. The findings are compatible with COVID-19. Heart and pulmonary vascularity are normal.  Neither costophrenic angle is blunted. Pneumonia which is likely bilateral.  The findings are compatible with COVID-19. CTA PULMONARY W CONTRAST    Result Date: 8/30/2021  EXAMINATION: CTA OF THE CHEST 8/30/2021 9:58 pm TECHNIQUE: CTA of the chest was performed after the administration of intravenous contrast.  Multiplanar reformatted images are provided for review. MIP images are provided for review. Dose modulation, iterative reconstruction, and/or weight based adjustment of the mA/kV was utilized to reduce the radiation dose to as low as reasonably achievable. COMPARISON: No prior chest CT. Correlation is made with a two-view study from earlier today.  HISTORY: ORDERING SYSTEM PROVIDED HISTORY: r/o pe TECHNOLOGIST PROVIDED HISTORY: Reason for exam:->r/o pe Decision Support Exception - unselect if not a suspected or confirmed emergency medical condition->Emergency Medical Condition (MA) FINDINGS: Pulmonary Arteries: Pulmonary arteries are adequately opacified for evaluation. No evidence of intraluminal filling defect to suggest pulmonary embolism. Main pulmonary artery is normal in caliber. Mediastinum: No evidence of mediastinal lymphadenopathy. The heart and pericardium demonstrate no acute abnormality. There is no acute abnormality of the thoracic aorta. Lungs/pleura: No pneumothorax or pleural effusion. In addition to patchy mild bibasilar subsegmental atelectasis, there are patchy ground-glass density to slightly greater than ground-glass density infiltrates seen throughout both lungs, with involvement of all lobes, with relative sparing of the superior left upper lobe. Involvement is specifically seen in the bilateral lower lobes, lateral left upper lobe, lateral right middle lobe and posterolateral right upper lobe. No bronchial wall thickening, pooling endobronchial secretions or significant mucous plugging. Upper Abdomen: Visualized upper abdominal viscera demonstrate no acute abnormality. 4 cm simple right renal cyst is noted, as is a prominent 14 mm nonobstructing nephrolith on the left. 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 Soft Tissues/Bones: No acute bone or soft tissue abnormality. No evidence of pulmonary embolism. Bilateral ground-glass to slightly greater than ground-glass density infiltrates, without pleural effusion, bronchial wall thickening or lymphadenopathy. Appearance is consistent with bilateral multifocal pneumonia, with global appearance and constellation of findings favoring COVID pneumonia. Prominent 14 mm nonobstructing left nephrolith.        Assessment:    Active Problems:    Acute respiratory failure with hypoxia ([address]  Resolved Problems:    * No resolved hospital problems. *      Plan:  1. Acute hypoxic respiratory failure, secondary to COVID-19 pneumonia -CRP elevated, currently on  2 L oxygen, s/p remdesivir day 2 and Tocilizumab 1 dose. Chest x-ray on admission B/L pneumonic infiltrate. CTA chest with no evidence of PE. There are bilateral groundglass infiltrates without pleural effusions. Consistent with multifocal pneumonia. Lab [id number]-R-iravc 1003, , fibrinogen > 700, CRP 10.3- now downtrending, pro-Keanu low. Consulted pulmonology, dexamethasone, multivitamins. Incentive spirometry, weaning as tolerated, nebulizers. 2.  Hyperlipidemia    3. Chronic back pain    4. Hx of CVA - continue aspirin and statin    5. History of abnormal stress test    6. Paroxysmal A. Fib - will monitor    7. Hypertension -was on lisinopril, hydrochlorothiazide, taking due to improved blood pressure, continue monitoring. 8.  S/p Pfizer COVID-19 vaccination - completed March 2021.     9.  Former smoker     10. Minimal BPR -in the setting of chronic hemorrhoid, will monitor closely as the patient has also received Tocilizumab. Abdomen nontender nonrigid. DVT prophylaxis - Lovenox 30 twice daily    NOTE: This report was transcribed using voice recognition software. Every effort was made to ensure accuracy; however, inadvertent computerized transcription errors may be present.   Electronically signed by Eligio Kumar MD on 9/2/2021 at 10:06 AM

## 2021-09-03 LAB
ALBUMIN SERPL-MCNC: 3.6 G/DL (ref 3.5–5.2)
ALP BLD-CCNC: 116 U/L (ref 40–129)
ALT SERPL-CCNC: 49 U/L (ref 0–40)
ANION GAP SERPL CALCULATED.3IONS-SCNC: 12 MMOL/L (ref 7–16)
AST SERPL-CCNC: 37 U/L (ref 0–39)
BILIRUB SERPL-MCNC: 0.6 MG/DL (ref 0–1.2)
BUN BLDV-MCNC: 18 MG/DL (ref 6–23)
C-REACTIVE PROTEIN: 2.3 MG/DL (ref 0–0.4)
CALCIUM SERPL-MCNC: 8.9 MG/DL (ref 8.6–10.2)
CHLORIDE BLD-SCNC: 101 MMOL/L (ref 98–107)
CO2: 23 MMOL/L (ref 22–29)
CREAT SERPL-MCNC: 0.6 MG/DL (ref 0.7–1.2)
CULTURE, RESPIRATORY: NORMAL
GFR AFRICAN AMERICAN: >60
GFR NON-AFRICAN AMERICAN: >60 ML/MIN/1.73
GLUCOSE BLD-MCNC: 191 MG/DL (ref 74–99)
HCT VFR BLD CALC: 45 % (ref 37–54)
HEMOGLOBIN: 15.1 G/DL (ref 12.5–16.5)
MCH RBC QN AUTO: 29.6 PG (ref 26–35)
MCHC RBC AUTO-ENTMCNC: 33.6 % (ref 32–34.5)
MCV RBC AUTO: 88.2 FL (ref 80–99.9)
PDW BLD-RTO: 12.4 FL (ref 11.5–15)
PLATELET # BLD: 348 E9/L (ref 130–450)
PMV BLD AUTO: 10.6 FL (ref 7–12)
POTASSIUM REFLEX MAGNESIUM: 3.8 MMOL/L (ref 3.5–5)
RBC # BLD: 5.1 E12/L (ref 3.8–5.8)
SMEAR, RESPIRATORY: NORMAL
SODIUM BLD-SCNC: 136 MMOL/L (ref 132–146)
TOTAL PROTEIN: 6.7 G/DL (ref 6.4–8.3)
WBC # BLD: 4.1 E9/L (ref 4.5–11.5)

## 2021-09-03 PROCEDURE — 2060000000 HC ICU INTERMEDIATE R&B

## 2021-09-03 PROCEDURE — 6360000002 HC RX W HCPCS: Performed by: INTERNAL MEDICINE

## 2021-09-03 PROCEDURE — 85027 COMPLETE CBC AUTOMATED: CPT

## 2021-09-03 PROCEDURE — 36415 COLL VENOUS BLD VENIPUNCTURE: CPT

## 2021-09-03 PROCEDURE — 6370000000 HC RX 637 (ALT 250 FOR IP): Performed by: INTERNAL MEDICINE

## 2021-09-03 PROCEDURE — 99233 SBSQ HOSP IP/OBS HIGH 50: CPT | Performed by: STUDENT IN AN ORGANIZED HEALTH CARE EDUCATION/TRAINING PROGRAM

## 2021-09-03 PROCEDURE — 2700000000 HC OXYGEN THERAPY PER DAY

## 2021-09-03 PROCEDURE — 2500000003 HC RX 250 WO HCPCS: Performed by: INTERNAL MEDICINE

## 2021-09-03 PROCEDURE — 80053 COMPREHEN METABOLIC PANEL: CPT

## 2021-09-03 PROCEDURE — 6370000000 HC RX 637 (ALT 250 FOR IP): Performed by: NURSE PRACTITIONER

## 2021-09-03 PROCEDURE — 2580000003 HC RX 258: Performed by: INTERNAL MEDICINE

## 2021-09-03 PROCEDURE — 6370000000 HC RX 637 (ALT 250 FOR IP): Performed by: STUDENT IN AN ORGANIZED HEALTH CARE EDUCATION/TRAINING PROGRAM

## 2021-09-03 PROCEDURE — 86140 C-REACTIVE PROTEIN: CPT

## 2021-09-03 RX ORDER — DEXAMETHASONE 4 MG/1
4 TABLET ORAL DAILY
Qty: 4 TABLET | Refills: 0 | Status: SHIPPED | OUTPATIENT
Start: 2021-09-08 | End: 2021-09-12

## 2021-09-03 RX ORDER — PANTOPRAZOLE SODIUM 40 MG/1
40 TABLET, DELAYED RELEASE ORAL
Qty: 30 TABLET | Refills: 3 | Status: SHIPPED | OUTPATIENT
Start: 2021-09-04

## 2021-09-03 RX ORDER — FLUTICASONE PROPIONATE 50 MCG
1 SPRAY, SUSPENSION (ML) NASAL DAILY
Qty: 32 G | Refills: 1 | Status: SHIPPED | OUTPATIENT
Start: 2021-09-03

## 2021-09-03 RX ORDER — GUAIFENESIN/DEXTROMETHORPHAN 100-10MG/5
5 SYRUP ORAL EVERY 4 HOURS PRN
Qty: 120 ML | Refills: 0 | Status: SHIPPED | OUTPATIENT
Start: 2021-09-03 | End: 2021-09-13

## 2021-09-03 RX ORDER — DEXAMETHASONE 2 MG/1
2 TABLET ORAL DAILY
Qty: 4 TABLET | Refills: 0 | Status: SHIPPED | OUTPATIENT
Start: 2021-09-13 | End: 2021-09-17

## 2021-09-03 RX ORDER — DEXAMETHASONE 6 MG/1
6 TABLET ORAL DAILY
Qty: 4 TABLET | Refills: 0 | Status: SHIPPED | OUTPATIENT
Start: 2021-09-03 | End: 2021-09-07

## 2021-09-03 RX ORDER — BENZONATATE 200 MG/1
200 CAPSULE ORAL 3 TIMES DAILY
Qty: 21 CAPSULE | Refills: 0 | Status: SHIPPED | OUTPATIENT
Start: 2021-09-03 | End: 2021-09-10

## 2021-09-03 RX ORDER — ALBUTEROL SULFATE 90 UG/1
2 AEROSOL, METERED RESPIRATORY (INHALATION) 4 TIMES DAILY
Qty: 18 G | Refills: 3 | Status: SHIPPED | OUTPATIENT
Start: 2021-09-03

## 2021-09-03 RX ADMIN — SODIUM CHLORIDE, PRESERVATIVE FREE 10 ML: 5 INJECTION INTRAVENOUS at 20:31

## 2021-09-03 RX ADMIN — MULTIPLE VITAMINS W/ MINERALS TAB 1 TABLET: TAB at 09:35

## 2021-09-03 RX ADMIN — DOCUSATE SODIUM 100 MG: 100 CAPSULE, LIQUID FILLED ORAL at 09:35

## 2021-09-03 RX ADMIN — POLYETHYLENE GLYCOL 3350 17 G: 17 POWDER, FOR SOLUTION ORAL at 09:35

## 2021-09-03 RX ADMIN — GEMFIBROZIL 600 MG: 600 TABLET ORAL at 06:30

## 2021-09-03 RX ADMIN — BENZONATATE 200 MG: 100 CAPSULE ORAL at 13:05

## 2021-09-03 RX ADMIN — PANTOPRAZOLE SODIUM 40 MG: 40 TABLET, DELAYED RELEASE ORAL at 06:30

## 2021-09-03 RX ADMIN — ALBUTEROL SULFATE 2 PUFF: 108 AEROSOL, METERED RESPIRATORY (INHALATION) at 20:31

## 2021-09-03 RX ADMIN — Medication 3 MG: at 20:31

## 2021-09-03 RX ADMIN — ALLOPURINOL 100 MG: 100 TABLET ORAL at 09:35

## 2021-09-03 RX ADMIN — BENZONATATE 200 MG: 100 CAPSULE ORAL at 09:35

## 2021-09-03 RX ADMIN — GEMFIBROZIL 600 MG: 600 TABLET ORAL at 15:56

## 2021-09-03 RX ADMIN — ALBUTEROL SULFATE 2 PUFF: 108 AEROSOL, METERED RESPIRATORY (INHALATION) at 13:06

## 2021-09-03 RX ADMIN — Medication 4000 UNITS: at 09:35

## 2021-09-03 RX ADMIN — REMDESIVIR 100 MG: 100 INJECTION, POWDER, LYOPHILIZED, FOR SOLUTION INTRAVENOUS at 20:31

## 2021-09-03 RX ADMIN — ALBUTEROL SULFATE 2 PUFF: 108 AEROSOL, METERED RESPIRATORY (INHALATION) at 09:36

## 2021-09-03 RX ADMIN — ALBUTEROL SULFATE 2 PUFF: 108 AEROSOL, METERED RESPIRATORY (INHALATION) at 15:56

## 2021-09-03 RX ADMIN — BENZONATATE 200 MG: 100 CAPSULE ORAL at 20:31

## 2021-09-03 RX ADMIN — ATORVASTATIN CALCIUM 40 MG: 40 TABLET, FILM COATED ORAL at 20:31

## 2021-09-03 RX ADMIN — DEXAMETHASONE SODIUM PHOSPHATE 6 MG: 4 INJECTION, SOLUTION INTRA-ARTICULAR; INTRALESIONAL; INTRAMUSCULAR; INTRAVENOUS; SOFT TISSUE at 20:31

## 2021-09-03 RX ADMIN — OXYCODONE HYDROCHLORIDE AND ACETAMINOPHEN 500 MG: 500 TABLET ORAL at 09:35

## 2021-09-03 RX ADMIN — SODIUM CHLORIDE, PRESERVATIVE FREE 10 ML: 5 INJECTION INTRAVENOUS at 09:36

## 2021-09-03 RX ADMIN — ZINC SULFATE 220 MG (50 MG) CAPSULE 50 MG: CAPSULE at 09:35

## 2021-09-03 ASSESSMENT — PAIN SCALES - GENERAL
PAINLEVEL_OUTOF10: 0

## 2021-09-03 NOTE — PROGRESS NOTES
Jill Arango M.D.,[facility]  Dorie Rodríguez D.O., F.A.C.O.I., Payal Panda M.D. Melody Whitlock M.D. Noemi Byrd D.O. Daily Pulmonary Progress Note    Patient:  Vesta Reid 78 y.o. male MRN: [id number]     Date of Service: 9/3/2021      Synopsis     We are following patient for Covid-19 pneumonia, CRP elevated, not on oxygen, remdesivir recommendation    \"CC\" cough, shortness of breath    Code status: Full      Subjective      Patient was seen and examined lying in bed in no apparent distress. He is currently on RA and is using the incentive spirometer. He has an occasional cough with clear sputum. Complains of post nasal drip with lying flat at HS. Pulse ox 94% on room air at rest  Pulse ox 91% on room air while ambulating. Patient does not qualify for outpatient oxygen    Review of Systems:  Constitutional: Denies fever, weight loss, night sweats, and fatigue  Skin: Denies pigmentation, dark lesions, and rashes   HEENT: Denies hearing loss, tinnitus, ear drainage, epistaxis, sore throat, and hoarseness. Cardiovascular: Denies palpitations, chest pain, and chest pressure. Respiratory: positive cough- improving, dyspnea at rest, hemoptysis, apnea, and choking.   Improved CHILEL  Gastrointestinal: Denies nausea, vomiting, poor appetite, diarrhea, heartburn or reflux  Genitourinary: Denies dysuria, frequency, urgency or hematuria  Musculoskeletal: Denies myalgias, muscle weakness, and bone pain  Neurological: Denies dizziness, vertigo, headache, and focal weakness  Psychological: Denies anxiety and depression  Endocrine: Denies heat intolerance and cold intolerance  Hematopoietic/Lymphatic: Denies bleeding problems and blood transfusions    24-hour events:  None    Objective   Vitals: BP (!) 131/90   Pulse 67   Temp 95.6 °F (35.3 °C) (Axillary)   Resp 18   Ht 5' 5\" (1.651 m)   Wt 175 lb 11.2 oz (79.7 kg)   SpO2 92%   BMI 29.24 kg/m²     I/O:    Intake/Output Summary (Last 24 hours) at 9/3/2021 1508  Last data filed at 9/3/2021 1300  Gross per 24 hour   Intake 540 ml   Output --   Net 540 ml       Vent Information  SpO2: 92 %      CURRENT MEDS :  Scheduled Meds:   melatonin  3 mg Oral Nightly    docusate sodium  100 mg Oral Daily    remdesivir IVPB  100 mg IntraVENous Q24H    allopurinol  100 mg Oral Daily    aspirin EC  81 mg Oral Daily    atorvastatin  40 mg Oral Daily    gemfibrozil  600 mg Oral BID    therapeutic multivitamin-minerals  1 tablet Oral Daily    Vitamin D  4,000 Units Oral Daily    sodium chloride flush  5-40 mL IntraVENous 2 times per day    enoxaparin  30 mg SubCUTAneous BID    dexamethasone  6 mg IntraVENous Q24H    pantoprazole  40 mg Oral QAM AC    ascorbic acid  500 mg Oral Daily    zinc sulfate  50 mg Oral Daily    benzonatate  200 mg Oral TID    albuterol sulfate HFA  2 puff Inhalation 4x daily       Physical Exam:  General: The patient is lying in bed comfortably without any distress. Breathing is not labored  HEENT: Pupils are equal round and reactive to light, there are no oral lesions and no post-nasal drip   Neck: supple without adenopathy  Cardiovascular: regular rate and rhythm without murmur or gallop  Respiratory: Clear to auscultation bilaterally without wheezing or crackles. Air entry is symmetric  Abdomen: soft, non-tender, non-distended, normal bowel sounds  Extremities: warm, no edema, no clubbing  Skin: no rash or lesion  Neurologic: CN II-XII grossly intact, no focal deficits     Pertinent/ New Labs and Imaging Studies     Imaging Personally Reviewed:  CXR 8/30  There is pneumonic infiltrate in the left mid to lower lung and I suspect   subtly in the right mid to upper lung.  The findings are compatible with   COVID-19.  Heart and pulmonary vascularity are normal.  Neither costophrenic   angle is blunted.        CTA pulmonary with contrast 8/30  No evidence of pulmonary embolism.       Bilateral ground-glass to slightly greater than ground-glass density   infiltrates, without pleural effusion, bronchial wall thickening or   lymphadenopathy.       Appearance is consistent with bilateral multifocal pneumonia, with global   appearance and constellation of findings favoring COVID pneumonia.       Prominent 14 mm nonobstructing left nephrolith         ECHO 4/19/2017   Summary   Left ventricular size is grossly normal.   Normal systolic function with LVEF estimated at 65%. There is doppler evidence of stage I diastolic dysfunction. Normal left atrium by volume index(29ml/m2). Normal estimated PA pressures. Signature      ----------------------------------------------------------------   Electronically signed by Vinton Hodgkin MD(Interpreting   physician) on 04/19/2017 04:47 PM      Labs:  Lab Results   Component Value Date    WBC 4.1 09/03/2021    HGB 15.1 09/03/2021    HCT 45.0 09/03/2021    MCV 88.2 09/03/2021    MCH 29.6 09/03/2021    MCHC 33.6 09/03/2021    RDW 12.4 09/03/2021     09/03/2021    MPV 10.6 09/03/2021     Lab Results   Component Value Date     09/03/2021    K 3.8 09/03/2021     09/03/2021    CO2 23 09/03/2021    BUN 18 09/03/2021    CREATININE 0.6 09/03/2021    LABALBU 3.6 09/03/2021    CALCIUM 8.9 09/03/2021    GFRAA >60 09/03/2021    LABGLOM >60 09/03/2021     Lab Results   Component Value Date    PROTIME 13.1 08/30/2021    INR 1.2 08/30/2021     No results for input(s): PROBNP in the last 72 hours. Recent Labs     09/01/21  0946   PROCAL 0.06     This SmartLink has not been configured with any valid records. Micro:  8/31 Covid positive  8/31 respiratory panel negative  8/31 respiratory culture pending  Recent Labs     08/31/21 2045   CULTRESP Oral Pharyngeal Carline present     No results for input(s): LABGRAM in the last 72 hours. No results for input(s): LEGUR in the last 72 hours.   Recent Labs     08/31/21 2040   STREPNEUMAGU Presumptive negative- suggests no current or recent  pneumococcal infection. Infection due to Strep pneumoniae cannot be  ruled out since the antigen present in the sample  may be below the detection limit of the test.  Normal Range:Presumptive Negative       Recent Labs     08/31/21  2040   LP1UAG Presumptive Negative -suggesting no recent or current infections  with Legionella pneumophila serogroup 1. Infection to Legionella cannot be ruled out since other serogroups  and species may cause infection, antigen may not be present in  early infection, or level of antigen may be below the  detection limit. Normal Range: Presumptive Negative            Assessment:    1. COVID-19 pneumonia, previously vaccinated with Carrillo Peter x2 doses  2. Acute respiratory insufficiency with hypoxia on exertion  3. Mild lymphopenia related to viral infection  4. Hypertension  5. Paroxysmal A. Fib  6. Elevated [id number]-[id number] related to viral infection  7. History of testicular cancer      Plan:   1. Monitor off oxygen, patient currently on room air. Per ambulatory walk test patient does not qualify for oxygen. 2. Continue dexamethasone 6 mg IV daily and vitamin therapy. CRP trending down to 4.8>2.3   Received Tocilizumab x1. Steroid taper written for dc  3. Respiratory culture : abundant gm+ cocci in chains, moderate gm+ cocci in clusters  4. Trend D-dimer continue Lovenox 30 mg subcu twice daily. 1003->1054>904  5. Tessalon 3 times daily for cough  6. Albuterol as needed for rescue ordered for dc  7. Increase activity as tolerated  8. Robitussin as needed, tessalon for cough. flonase nasal spray at HS written for dc  9. Follow chest x-ray serially 8/30/21  10. Encourage incentive spirometer, recruitment techniques, self proning, side-lying  11. GI prophylaxis  12. Okay to discharge from pulmonary point of view. Follow-up as needed.      This plan of care was reviewed in collaboration with [address] [place] [address]  Electronically signed by SUSY Villafana CNP on 9/3/2021 at 3:08 PM    I personally saw, examined, and cared for the patient. Labs, medications, radiographs reviewed. I agree with history exam and plans detailed in NP note.       Uvaldo Long, DO

## 2021-09-03 NOTE — PROGRESS NOTES
nursing assessments, other physicians' exams and assessments, as well as telemedicine interaction. Recent Labs     09/01/21  0946 09/02/21  0845 09/03/21  0830    137 136   K 4.3 3.7 3.8    101 101   CO2 25 21* 23   BUN 18 20 18   CREATININE 0.7 0.6* 0.6*   GLUCOSE 140* 148* 191*   CALCIUM 9.3 9.3 8.9       Recent Labs     09/01/21  0946 09/02/21  0845 09/03/21  0830   WBC 12.9* 5.4 4.1*   RBC 5.27 5.20 5.10   HGB 15.4 15.5 15.1   HCT 46.6 45.7 45.0   MCV 88.4 87.9 88.2   MCH 29.2 29.8 29.6   MCHC 33.0 33.9 33.6   RDW 12.8 12.7 12.4    341 348   MPV 10.5 10.4 10.6       Micro:  No components found for: [facility])    Radiology:   XR CHEST PORTABLE    Result Date: 8/30/2021  EXAMINATION: ONE XRAY VIEW OF THE CHEST 8/30/2021 2:27 pm COMPARISON: 22 February 2020 HISTORY: ORDERING SYSTEM PROVIDED HISTORY: covid+ TECHNOLOGIST PROVIDED HISTORY: Reason for exam:->covid+ FINDINGS: There is pneumonic infiltrate in the left mid to lower lung and I suspect subtly in the right mid to upper lung. The findings are compatible with COVID-19. Heart and pulmonary vascularity are normal.  Neither costophrenic angle is blunted. Pneumonia which is likely bilateral.  The findings are compatible with COVID-19. CTA PULMONARY W CONTRAST    Result Date: 8/30/2021  EXAMINATION: CTA OF THE CHEST 8/30/2021 9:58 pm TECHNIQUE: CTA of the chest was performed after the administration of intravenous contrast.  Multiplanar reformatted images are provided for review. MIP images are provided for review. Dose modulation, iterative reconstruction, and/or weight based adjustment of the mA/kV was utilized to reduce the radiation dose to as low as reasonably achievable. COMPARISON: No prior chest CT. Correlation is made with a two-view study from earlier today.  HISTORY: ORDERING SYSTEM PROVIDED HISTORY: r/o pe TECHNOLOGIST PROVIDED HISTORY: Reason for exam:->r/o pe Decision Support Exception - unselect if not a suspected or confirmed emergency medical condition->Emergency Medical Condition (MA) FINDINGS: Pulmonary Arteries: Pulmonary arteries are adequately opacified for evaluation. No evidence of intraluminal filling defect to suggest pulmonary embolism. Main pulmonary artery is normal in caliber. Mediastinum: No evidence of mediastinal lymphadenopathy. The heart and pericardium demonstrate no acute abnormality. There is no acute abnormality of the thoracic aorta. Lungs/pleura: No pneumothorax or pleural effusion. In addition to patchy mild bibasilar subsegmental atelectasis, there are patchy ground-glass density to slightly greater than ground-glass density infiltrates seen throughout both lungs, with involvement of all lobes, with relative sparing of the superior left upper lobe. Involvement is specifically seen in the bilateral lower lobes, lateral left upper lobe, lateral right middle lobe and posterolateral right upper lobe. No bronchial wall thickening, pooling endobronchial secretions or significant mucous plugging. Upper Abdomen: Visualized upper abdominal viscera demonstrate no acute abnormality. 4 cm simple right renal cyst is noted, as is a prominent 14 mm nonobstructing nephrolith on the left. 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 Soft Tissues/Bones: No acute bone or soft tissue abnormality. No evidence of pulmonary embolism. Bilateral ground-glass to slightly greater than ground-glass density infiltrates, without pleural effusion, bronchial wall thickening or lymphadenopathy. Appearance is consistent with bilateral multifocal pneumonia, with global appearance and constellation of findings favoring COVID pneumonia. Prominent 14 mm nonobstructing left nephrolith. Assessment:    Active Problems:    Acute respiratory failure with hypoxia (HCC)  Resolved Problems:    * No resolved hospital problems. *      Plan:  1. Acute hypoxic respiratory failure, secondary to COVID-19 pneumonia -CRP elevated, currently on  2 L oxygen, s/p remdesivir day 3 and Tocilizumab 1 dose. Chest x-ray on admission B/L pneumonic infiltrate. CTA chest with no evidence of PE. There are bilateral groundglass infiltrates without pleural effusions. Consistent with multifocal pneumonia. Lab [id number]-J-boikt 1003, , fibrinogen > 700, CRP 10.3- now downtrending, pro-Keanu low. Consulted pulmonology, dexamethasone, multivitamins. Incentive spirometry, weaning as tolerated, nebulizers. 2.  Hyperlipidemia    3. Chronic back pain    4. Hx of CVA - continue aspirin and statin    5. History of abnormal stress test    6. Paroxysmal A. Fib - will monitor    7. Hypertension -was on lisinopril, hydrochlorothiazide, taking due to improved blood pressure, continue monitoring. 8.  S/p Pfizer COVID-19 vaccination - completed March 2021.     9.  Former smoker     10. Minimal BPR -in the setting of chronic hemorrhoid, will monitor closely as the patient has also received Tocilizumab. Abdomen nontender nonrigid. Holding Lovenox and aspirin      DVT prophylaxis - Lovenox 30 twice daily - held  Most likely discharge tomorrow if clinically remained stable. NOTE: This report was transcribed using voice recognition software. Every effort was made to ensure accuracy; however, inadvertent computerized transcription errors may be present.   Electronically signed by Argenis Wilburn MD on 9/3/2021 at 2:44 PM

## 2021-09-03 NOTE — CARE COORDINATION
Patient/family seen: Yes  Spoke to the patient on the phone     Informed patient/family of [facility] Medical Bundle Program with potential outreach by either Care Transitions Team or [facility] Team based on hospital admission and location.        BPCI-A Notification Letter given: Yes mailing letter         Current discharge plan:

## 2021-09-03 NOTE — CARE COORDINATION
Social Work/Discharge Planning:  Chart reviewed. Remdesivir day 3.  RN completed pulse oxygen testing and patient will not require oxygen at discharge. Plan remains to return home at discharge. Will continue to follow.   Electronically signed by MEIR Mi on 9/3/2021 at 10:34 AM

## 2021-09-03 NOTE — PROGRESS NOTES
Pulse ox was 94% on room air at rest.   Ambulated patient on room air. Oxygen saturation was 91% on room air while ambulating. Oxygen applied.    Recovery pulse ox was 95% on 2 liters of oxygen while ambulating

## 2021-09-04 ENCOUNTER — CARE COORDINATION (OUTPATIENT)
Dept: CASE MANAGEMENT | Age: 66
End: 2021-09-04

## 2021-09-04 VITALS
HEART RATE: 73 BPM | WEIGHT: 173 LBS | DIASTOLIC BLOOD PRESSURE: 79 MMHG | SYSTOLIC BLOOD PRESSURE: 114 MMHG | TEMPERATURE: 98.3 F | BODY MASS INDEX: 28.82 KG/M2 | OXYGEN SATURATION: 92 % | HEIGHT: 65 IN | RESPIRATION RATE: 16 BRPM

## 2021-09-04 LAB
ALBUMIN SERPL-MCNC: 3.5 G/DL (ref 3.5–5.2)
ALP BLD-CCNC: 108 U/L (ref 40–129)
ALT SERPL-CCNC: 58 U/L (ref 0–40)
ANION GAP SERPL CALCULATED.3IONS-SCNC: 12 MMOL/L (ref 7–16)
AST SERPL-CCNC: 37 U/L (ref 0–39)
BILIRUB SERPL-MCNC: 0.6 MG/DL (ref 0–1.2)
BUN BLDV-MCNC: 17 MG/DL (ref 6–23)
C-REACTIVE PROTEIN: 1.4 MG/DL (ref 0–0.4)
CALCIUM SERPL-MCNC: 9.4 MG/DL (ref 8.6–10.2)
CHLORIDE BLD-SCNC: 99 MMOL/L (ref 98–107)
CO2: 24 MMOL/L (ref 22–29)
CREAT SERPL-MCNC: 0.6 MG/DL (ref 0.7–1.2)
GFR AFRICAN AMERICAN: >60
GFR NON-AFRICAN AMERICAN: >60 ML/MIN/1.73
GLUCOSE BLD-MCNC: 170 MG/DL (ref 74–99)
HCT VFR BLD CALC: 44.2 % (ref 37–54)
HEMOGLOBIN: 14.7 G/DL (ref 12.5–16.5)
MCH RBC QN AUTO: 29.2 PG (ref 26–35)
MCHC RBC AUTO-ENTMCNC: 33.3 % (ref 32–34.5)
MCV RBC AUTO: 87.9 FL (ref 80–99.9)
PDW BLD-RTO: 12.5 FL (ref 11.5–15)
PLATELET # BLD: 345 E9/L (ref 130–450)
PMV BLD AUTO: 10.6 FL (ref 7–12)
POTASSIUM REFLEX MAGNESIUM: 4.3 MMOL/L (ref 3.5–5)
RBC # BLD: 5.03 E12/L (ref 3.8–5.8)
SODIUM BLD-SCNC: 135 MMOL/L (ref 132–146)
TOTAL PROTEIN: 6.7 G/DL (ref 6.4–8.3)
WBC # BLD: 3.9 E9/L (ref 4.5–11.5)

## 2021-09-04 PROCEDURE — 86140 C-REACTIVE PROTEIN: CPT

## 2021-09-04 PROCEDURE — 80053 COMPREHEN METABOLIC PANEL: CPT

## 2021-09-04 PROCEDURE — 99239 HOSP IP/OBS DSCHRG MGMT >30: CPT | Performed by: STUDENT IN AN ORGANIZED HEALTH CARE EDUCATION/TRAINING PROGRAM

## 2021-09-04 PROCEDURE — 6370000000 HC RX 637 (ALT 250 FOR IP): Performed by: INTERNAL MEDICINE

## 2021-09-04 PROCEDURE — 6370000000 HC RX 637 (ALT 250 FOR IP): Performed by: NURSE PRACTITIONER

## 2021-09-04 PROCEDURE — 2580000003 HC RX 258: Performed by: INTERNAL MEDICINE

## 2021-09-04 PROCEDURE — 36415 COLL VENOUS BLD VENIPUNCTURE: CPT

## 2021-09-04 PROCEDURE — 85027 COMPLETE CBC AUTOMATED: CPT

## 2021-09-04 PROCEDURE — 6360000002 HC RX W HCPCS: Performed by: INTERNAL MEDICINE

## 2021-09-04 RX ADMIN — PANTOPRAZOLE SODIUM 40 MG: 40 TABLET, DELAYED RELEASE ORAL at 06:06

## 2021-09-04 RX ADMIN — SODIUM CHLORIDE, PRESERVATIVE FREE 10 ML: 5 INJECTION INTRAVENOUS at 08:42

## 2021-09-04 RX ADMIN — ALBUTEROL SULFATE 2 PUFF: 108 AEROSOL, METERED RESPIRATORY (INHALATION) at 08:43

## 2021-09-04 RX ADMIN — GEMFIBROZIL 600 MG: 600 TABLET ORAL at 06:06

## 2021-09-04 RX ADMIN — ENOXAPARIN SODIUM 30 MG: 30 INJECTION SUBCUTANEOUS at 08:42

## 2021-09-04 ASSESSMENT — PAIN SCALES - GENERAL: PAINLEVEL_OUTOF10: 0

## 2021-09-04 NOTE — DISCHARGE SUMMARY
[facility] Physician Discharge Summary       [address] [phone]      As needed      Activity level: As tolerated     Dispo: Home with self-care    Condition on discharge: Stable     Patient ID:  Rodríguez Garner  [id number]  41 y.o.  1955    Admit date: 8/30/2021    Discharge date and time:  9/4/2021  10:08 AM    Admission Diagnoses: Active Problems:    Acute respiratory failure with hypoxia (HCC)  Resolved Problems:    * No resolved hospital problems. *      Discharge Diagnoses: Active Problems:    Acute respiratory failure with hypoxia (HCC)  Resolved Problems:    * No resolved hospital problems. *      Consults:  IP CONSULT TO PHARMACY  IP CONSULT TO PULMONOLOGY  IP CONSULT TO PHARMACY    Procedures: As per hospital course    Hospital Course:   Patient Rodríguez Garner is a 78 y.o. presented with Acute respiratory failure with hypoxia ([facility] [address] [J96.01]  COVID-19 [U07.1]  Patient was admitted and managed for Acute hypoxic respiratory failure, secondary to COVID-19 pneumonia -CRP elevated, was on  2 L oxygen, s/p remdesivir 4 days and Tocilizumab 1 dose. Chest x-ray on admission B/L pneumonic infiltrate.  CTA chest with no evidence of PE.  There are bilateral groundglass infiltrates without pleural effusions.  Consistent with multifocal pneumonia. Lab qqxwvwi-U-eanzi 1003, , fibrinogen > 700, CRP 10.3- now downtrending, pro-Keanu low. Consulted pulmonology, dexamethasone, multivitamins.   Incentive spirometry, weaning as tolerated, nebulizers.     Discharge Exam:  General Appearance: alert and oriented to person, place and time and in no acute distress  Skin: warm and dry  Head: normocephalic and atraumatic  Eyes: pupils equal, round, and reactive to light, extraocular eye movements intact, conjunctivae normal  Neck: neck supple and non tender without mass   Pulmonary/Chest: clear to auscultation bilaterally- no wheezes, rales or utilized to reduce the radiation dose to as low as reasonably achievable. COMPARISON: No prior chest CT. Correlation is made with a two-view study from earlier today. HISTORY: ORDERING SYSTEM PROVIDED HISTORY: r/o pe TECHNOLOGIST PROVIDED HISTORY: Reason for exam:->r/o pe Decision Support Exception - unselect if not a suspected or confirmed emergency medical condition->Emergency Medical Condition (MA) FINDINGS: Pulmonary Arteries: Pulmonary arteries are adequately opacified for evaluation. No evidence of intraluminal filling defect to suggest pulmonary embolism. Main pulmonary artery is normal in caliber. Mediastinum: No evidence of mediastinal lymphadenopathy. The heart and pericardium demonstrate no acute abnormality. There is no acute abnormality of the thoracic aorta. Lungs/pleura: No pneumothorax or pleural effusion. In addition to patchy mild bibasilar subsegmental atelectasis, there are patchy ground-glass density to slightly greater than ground-glass density infiltrates seen throughout both lungs, with involvement of all lobes, with relative sparing of the superior left upper lobe. Involvement is specifically seen in the bilateral lower lobes, lateral left upper lobe, lateral right middle lobe and posterolateral right upper lobe. No bronchial wall thickening, pooling endobronchial secretions or significant mucous plugging. Upper Abdomen: Visualized upper abdominal viscera demonstrate no acute abnormality. 4 cm simple right renal cyst is noted, as is a prominent 14 mm nonobstructing nephrolith on the left. 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 Soft Tissues/Bones: No acute bone or soft tissue abnormality. No evidence of pulmonary embolism. Bilateral ground-glass to slightly greater than ground-glass density infiltrates, without pleural effusion, bronchial wall thickening or lymphadenopathy.  Appearance is consistent with bilateral multifocal pneumonia, with global appearance and constellation of findings favoring COVID pneumonia. Prominent 14 mm nonobstructing left nephrolith. Patient Instructions:      Medication List      START taking these medications    albuterol sulfate  (90 Base) MCG/ACT inhaler  Inhale 2 puffs into the lungs 4 times daily     benzonatate 200 MG capsule  Commonly known as: TESSALON  Take 1 capsule by mouth 3 times daily for 7 days     * dexamethasone 6 MG tablet  Commonly known as: DECADRON  Take 1 tablet by mouth daily for 4 days     * dexamethasone 4 MG tablet  Commonly known as: DECADRON  Take 1 tablet by mouth daily for 4 days  Start taking on: September 8, 2021     * dexamethasone 2 MG tablet  Commonly known as: DECADRON  Take 1 tablet by mouth daily for 4 days  Start taking on: September 13, 2021     fluticasone 50 MCG/ACT nasal spray  Commonly known as: FLONASE  1 spray by Each Nostril route daily     guaiFENesin-dextromethorphan 100-10 MG/5ML syrup  Commonly known as: ROBITUSSIN DM  Take 5 mLs by mouth every 4 hours as needed for Cough     pantoprazole 40 MG tablet  Commonly known as: PROTONIX  Take 1 tablet by mouth every morning (before breakfast)         * This list has 3 medication(s) that are the same as other medications prescribed for you. Read the directions carefully, and ask your doctor or other care provider to review them with you.             CONTINUE taking these medications    allopurinol 100 MG tablet  Commonly known as: ZYLOPRIM     aspirin EC 81 MG EC tablet     atorvastatin 40 MG tablet  Commonly known as: LIPITOR  TAKE 1 TABLET BY MOUTH EVERY DAY     Blood Pressure Kit  Check BP  Once daily     colchicine 0.6 MG tablet  Commonly known as: Colcrys  Take 1 tablet by mouth daily for 14 days 1 tab po qd     etodolac 400 MG tablet  Commonly known as: LODINE  Take 1 tablet by mouth 2 times daily     gemfibrozil 600 MG tablet  Commonly known as: LOPID ibuprofen 600 MG tablet  Commonly known as: IBU  Take 1 tablet by mouth every 8 hours as needed for Pain Take with food.      magnesium gluconate 500 MG tablet  Commonly known as: MAGONATE     psyllium 28.3 % Pack  Commonly known as: KONSYL     therapeutic multivitamin-minerals tablet     Vascepa 1 g Caps capsule  Generic drug: Icosapent Ethyl     vitamin D 1000 UNIT Tabs tablet  Commonly known as: CHOLECALCIFEROL           Where to Get Your Medications      [id number] [address]  [facility] [address]    Phone: [phone]   · albuterol sulfate  (90 Base) MCG/ACT inhaler  · benzonatate 200 MG capsule  · dexamethasone 2 MG tablet  · dexamethasone 4 MG tablet  · dexamethasone 6 MG tablet  · fluticasone 50 MCG/ACT nasal spray  · guaiFENesin-dextromethorphan 100-10 MG/5ML syrup  · pantoprazole 40 MG tablet           Note that more than 30 minutes was spent in preparing discharge papers, discussing discharge with patient, medication review, etc.    Signed:  Electronically signed by Raymond Quintanilla MD on 9/4/2021 at 10:08 AM

## 2021-09-07 ENCOUNTER — CARE COORDINATION (OUTPATIENT)
Dept: CASE MANAGEMENT | Age: 66
End: 2021-09-07

## 2021-09-07 DIAGNOSIS — J96.01 ACUTE RESPIRATORY FAILURE WITH HYPOXIA (HCC): Primary | ICD-10-CM

## 2021-09-07 PROCEDURE — 1111F DSCHRG MED/CURRENT MED MERGE: CPT | Performed by: FAMILY MEDICINE

## 2021-09-07 NOTE — CARE COORDINATION
Patient reports he continues to have cough, chest congestion and feel sob at times but no worse since he left the hospital. States his pulse ox runs in the low 90's and is aware if it gets lower to go to ED. Denies fever, chills, nausea, vomiting or diarrhea. Reports his appetite is good and has no trouble eliminating bowel/bladder. Patient contacted regarding COVID-19 risk, exposure, diagnosis, pulse oximeter ordered at discharge and monoclonal antibody infusion follow up. Discussed COVID-19 related testing which was available at this time. Test results were positive. Patient informed of results, if available? Yes. Care Transition Nurse contacted the patient by telephone to perform post discharge assessment. Call within 2 business days of discharge: Yes. Verified name and  with patient as identifiers. Provided introduction to self, and explanation of the CTN/ACM role, and reason for call due to risk factors for infection and/or exposure to COVID-19. Symptoms reviewed with patient who verbalized the following symptoms: cough, shortness of breath, no new symptoms, no worsening symptoms and chest congestion. Due to no new or worsening symptoms encounter was not routed to provider for escalation. Discussed follow-up appointments. If no appointment was previously scheduled, appointment scheduling offered: Yes. [facility] follow up appointment(s): No future appointments. Non-[facility] follow up appointment(s):     Non-face-to-face services provided:  Obtained and reviewed discharge summary and/or continuity of care documents     Advance Care Planning:   Does patient have an Advance Directive:  reviewed and current. Advance Care Planning   Healthcare Decision Maker:    Primary Decision Maker: Clara Rodríguez - Emely - [phone]    Secondary Decision Maker: Terry Garcia - [phone]      Educated patient about risk for severe COVID-19 due to risk factors according to CDC guidelines.  CTN reviewed discharge instructions, medical action plan and red flag symptoms with the patient who verbalized understanding. Discussed COVID vaccination status: Yes. Education provided on COVID-19 vaccination as appropriate. Discussed exposure protocols and quarantine with CDC Guidelines. Patient was given an opportunity to verbalize any questions and concerns and agrees to contact CTN or health care provider for questions related to their healthcare. Reviewed and educated patient on any new and changed medications related to discharge diagnosis     Was patient discharged with a pulse oximeter? Yes Discussed and confirmed pulse oximeter discharge instructions and when to notify provider or seek emergency care. CTN provided contact information. [facility] to follow x 30 days then hand off to CTN based on severity of symptoms and risk factors.

## 2021-10-07 ENCOUNTER — CARE COORDINATION (OUTPATIENT)
Dept: CASE MANAGEMENT | Age: 66
End: 2021-10-07

## 2021-10-07 NOTE — CARE COORDINATION
[address] Transitions Follow Up Call   [facility]    10/7/2021    Patient: Hazel Carmichael  Patient : 1955   MRN: <[id number]>  Reason for Admission: Pneumonia due to Covid 19  Discharge Date: 21 RARS: Readmission Risk Score: 10    [facility] Care Transitions Follow Up Call:  Spoke with patient for Follow up [address] Transition Call post hospital discharge. Patient states he is doing well. Denies SOB, wheezing, cough, chest pain with inspiration or expiration, fatigue, fever or chills. Denies, loss of taste or smell, dizziness, N/V/D, Pain and body aches, confusion. Patient confirms all medications are available and are being taken as directed. Patient has fair appetite and is drinking adequate fluids. Normal bladder and bowel elimination patterns. Patient states he has seen PCP X 2 since discharge. Patient has no needs or concerns for writer at this time. Will continue to follow. [place], [place]    [phone]  Dakota Apo / [address] Coordinator    Needs to be reviewed by the provider   Additional needs identified to be addressed with provider No  none             Method of communication with provider : none    Care Transition Nurse (CTN) contacted the patient by telephone to follow up after admission on 2021. Verified name and  with patient as identifiers. Addressed changes since last contact: [place] much better  Discussed follow-up appointments. If no appointment was previously scheduled, appointment scheduling offered: Yes   Is follow up appointment scheduled within 7 days of discharge? Yes    Advance Care Planning:   Does patient have an Advance Directive:  reviewed and current. CTN reviewed discharge instructions, medical action plan and red flags with patient and discussed any barriers to care and/or understanding of plan of care after discharge.    Discussed appropriate site of care based on symptoms and resources available to patient including: PCP and When to call 911. The patient agrees to contact the PCP office for questions related to their healthcare. Patients top risk factors for readmission: lack of knowledge about disease, medical condition-Pneumonia due to Covid 19 and medication management  Interventions to address risk factors: Obtained and reviewed discharge summary and/or continuity of care documents    Non-[facility] follow up appointment(s): X 2 since discharge with PCP. CTN provided contact information for future needs. Plan for follow-up call in 7-10 days based on severity of symptoms and risk factors. Plan for next call: symptom management-Pneumonia due to Covid 19    Care Transitions Subsequent and Final Call    Subsequent and Final Calls  Do you have any ongoing symptoms?: No  Have your medications changed?: No  Do you have any questions related to your medications?: No  Do you currently have any active services?: No  Do you have any needs or concerns that I can assist you with?: No  Identified Barriers: None  Care Transitions Interventions    Pharmacist: Declined    Other Interventions: Follow Up  No future appointments.     Kaushik Posey LPN

## 2021-10-15 ENCOUNTER — CARE COORDINATION (OUTPATIENT)
Dept: CARE COORDINATION | Age: 66
End: 2021-10-15

## 2021-10-15 NOTE — CARE COORDINATION
future needs. Plan for follow-up call in 5-7 days based on severity of symptoms and risk factors. Plan for next call: symptom management-post covid            Care Transitions Subsequent and Final Call    Subsequent and Final Calls  Care Transitions Interventions  Other Interventions: Follow Up  No future appointments.     Sofía Arthur LPN

## 2021-10-29 ENCOUNTER — CARE COORDINATION (OUTPATIENT)
Dept: CASE MANAGEMENT | Age: 66
End: 2021-10-29

## 2021-10-29 NOTE — CARE COORDINATION
[address] Transitions Follow Up Call    10/29/2021    Patient: Maribel Boyer  Patient : 1955   MRN: [id number]  Reason for Admission:   Discharge Date: 21 RARS: Readmission Risk Score: 10         Spoke with: PatientChrista Transitions Subsequent and Final Call    Subsequent and Final Calls  Do you have any ongoing symptoms?: No  Do you have any questions related to your medications?: No  Do you have any needs or concerns that I can assist you with?: No  Identified Barriers: None  Care Transitions Interventions  No Identified Needs    Patient is pleasant in conversation and reports he is back to baseline.  -denies fever, chills, shortness of breath, cough, or chest discomfort   -reports SpO2 95% RA   -reports scheduled for 3 week follow up with PCP   -voices no needs or concerns at this time     Emotional support provided. Patient has ICD-10 U07.1. Will continue to follow peripherally. Follow Up  No future appointments.     Francisco Blunt RN

## 2023-08-02 ENCOUNTER — APPOINTMENT (OUTPATIENT)
Dept: CT IMAGING | Age: 68
DRG: 234 | End: 2023-08-02
Attending: EMERGENCY MEDICINE
Payer: MEDICARE

## 2023-08-02 ENCOUNTER — APPOINTMENT (OUTPATIENT)
Dept: GENERAL RADIOLOGY | Age: 68
DRG: 234 | End: 2023-08-02
Payer: MEDICARE

## 2023-08-02 LAB
ALBUMIN SERPL-MCNC: 4.7 G/DL (ref 3.5–5.2)
ALP SERPL-CCNC: 76 U/L (ref 40–129)
ALT SERPL-CCNC: 21 U/L (ref 0–40)
ANION GAP SERPL CALCULATED.3IONS-SCNC: 14 MMOL/L (ref 7–16)
AST SERPL-CCNC: 23 U/L (ref 0–39)
BASOPHILS # BLD: 0.1 K/UL (ref 0–0.2)
BASOPHILS NFR BLD: 1 % (ref 0–2)
BILIRUB SERPL-MCNC: 0.7 MG/DL (ref 0–1.2)
BUN SERPL-MCNC: 21 MG/DL (ref 6–23)
CALCIUM SERPL-MCNC: 9.9 MG/DL (ref 8.6–10.2)
CHLORIDE SERPL-SCNC: 108 MMOL/L (ref 98–107)
CO2 SERPL-SCNC: 23 MMOL/L (ref 22–29)
CREAT SERPL-MCNC: 1.2 MG/DL (ref 0.7–1.2)
EOSINOPHIL # BLD: 0.17 K/UL (ref 0.05–0.5)
EOSINOPHILS RELATIVE PERCENT: 2 % (ref 0–6)
ERYTHROCYTE [DISTWIDTH] IN BLOOD BY AUTOMATED COUNT: 13.4 % (ref 11.5–15)
GFR SERPL CREATININE-BSD FRML MDRD: >60 ML/MIN/1.73M2
GLUCOSE SERPL-MCNC: 94 MG/DL (ref 74–99)
HCT VFR BLD AUTO: 44.4 % (ref 37–54)
HGB BLD-MCNC: 14.8 G/DL (ref 12.5–16.5)
IMM GRANULOCYTES # BLD AUTO: <0.03 K/UL (ref 0–0.58)
IMM GRANULOCYTES NFR BLD: 0 % (ref 0–5)
LYMPHOCYTES NFR BLD: 2.45 K/UL (ref 1.5–4)
LYMPHOCYTES RELATIVE PERCENT: 29 % (ref 20–42)
MCH RBC QN AUTO: 30.4 PG (ref 26–35)
MCHC RBC AUTO-ENTMCNC: 33.3 G/DL (ref 32–34.5)
MCV RBC AUTO: 91.2 FL (ref 80–99.9)
MONOCYTES NFR BLD: 1.05 K/UL (ref 0.1–0.95)
MONOCYTES NFR BLD: 12 % (ref 2–12)
NEUTROPHILS NFR BLD: 56 % (ref 43–80)
NEUTS SEG NFR BLD: 4.75 K/UL (ref 1.8–7.3)
PLATELET # BLD AUTO: 294 K/UL (ref 130–450)
PMV BLD AUTO: 10.2 FL (ref 7–12)
POTASSIUM SERPL-SCNC: 4.1 MMOL/L (ref 3.5–5)
PROT SERPL-MCNC: 7.5 G/DL (ref 6.4–8.3)
RBC # BLD AUTO: 4.87 M/UL (ref 3.8–5.8)
SODIUM SERPL-SCNC: 145 MMOL/L (ref 132–146)
TROPONIN I SERPL HS-MCNC: 29 NG/L (ref 0–11)
WBC OTHER # BLD: 8.5 K/UL (ref 4.5–11.5)

## 2023-08-02 PROCEDURE — 71275 CT ANGIOGRAPHY CHEST: CPT

## 2023-08-02 PROCEDURE — 93005 ELECTROCARDIOGRAM TRACING: CPT

## 2023-08-02 PROCEDURE — 71046 X-RAY EXAM CHEST 2 VIEWS: CPT

## 2023-08-02 PROCEDURE — 6360000004 HC RX CONTRAST MEDICATION: Performed by: RADIOLOGY

## 2023-08-02 PROCEDURE — 85025 COMPLETE CBC W/AUTO DIFF WBC: CPT

## 2023-08-02 PROCEDURE — 70450 CT HEAD/BRAIN W/O DYE: CPT

## 2023-08-02 PROCEDURE — 99285 EMERGENCY DEPT VISIT HI MDM: CPT

## 2023-08-02 PROCEDURE — 80053 COMPREHEN METABOLIC PANEL: CPT

## 2023-08-02 PROCEDURE — 2580000003 HC RX 258: Performed by: RADIOLOGY

## 2023-08-02 PROCEDURE — 84484 ASSAY OF TROPONIN QUANT: CPT

## 2023-08-02 RX ORDER — SODIUM CHLORIDE 0.9 % (FLUSH) 0.9 %
10 SYRINGE (ML) INJECTION PRN
Status: COMPLETED | OUTPATIENT
Start: 2023-08-02 | End: 2023-08-02

## 2023-08-02 RX ORDER — SODIUM CHLORIDE 0.9 % (FLUSH) 0.9 %
10 SYRINGE (ML) INJECTION PRN
Status: CANCELLED | OUTPATIENT
Start: 2023-08-02

## 2023-08-02 RX ADMIN — IOPAMIDOL 70 ML: 755 INJECTION, SOLUTION INTRAVENOUS at 23:46

## 2023-08-02 RX ADMIN — Medication 10 ML: at 23:46

## 2023-08-02 ASSESSMENT — PAIN DESCRIPTION - PAIN TYPE: TYPE: ACUTE PAIN

## 2023-08-02 ASSESSMENT — PAIN DESCRIPTION - ORIENTATION: ORIENTATION: LEFT

## 2023-08-02 ASSESSMENT — PAIN DESCRIPTION - DESCRIPTORS: DESCRIPTORS: ACHING

## 2023-08-02 ASSESSMENT — PAIN DESCRIPTION - LOCATION: LOCATION: BACK;ARM

## 2023-08-02 ASSESSMENT — PAIN SCALES - GENERAL: PAINLEVEL_OUTOF10: 5

## 2023-08-02 ASSESSMENT — PAIN - FUNCTIONAL ASSESSMENT: PAIN_FUNCTIONAL_ASSESSMENT: 0-10

## 2023-08-03 ENCOUNTER — APPOINTMENT (OUTPATIENT)
Dept: CT IMAGING | Age: 68
DRG: 234 | End: 2023-08-03
Payer: MEDICARE

## 2023-08-03 ENCOUNTER — APPOINTMENT (OUTPATIENT)
Dept: INTERVENTIONAL RADIOLOGY/VASCULAR | Age: 68
DRG: 234 | End: 2023-08-03
Payer: MEDICARE

## 2023-08-03 ENCOUNTER — APPOINTMENT (OUTPATIENT)
Dept: ULTRASOUND IMAGING | Age: 68
DRG: 234 | End: 2023-08-03
Payer: MEDICARE

## 2023-08-03 ENCOUNTER — HOSPITAL ENCOUNTER (INPATIENT)
Age: 68
LOS: 7 days | Discharge: HOME HEALTH | DRG: 234 | End: 2023-08-10
Attending: EMERGENCY MEDICINE | Admitting: INTERNAL MEDICINE
Payer: MEDICARE

## 2023-08-03 DIAGNOSIS — R77.8 ELEVATED TROPONIN: ICD-10-CM

## 2023-08-03 DIAGNOSIS — G89.18 ACUTE POST-OPERATIVE PAIN: ICD-10-CM

## 2023-08-03 DIAGNOSIS — M54.6 ACUTE LEFT-SIDED THORACIC BACK PAIN: ICD-10-CM

## 2023-08-03 DIAGNOSIS — Z95.1 S/P CABG (CORONARY ARTERY BYPASS GRAFT): ICD-10-CM

## 2023-08-03 DIAGNOSIS — I21.4 NON-STEMI (NON-ST ELEVATED MYOCARDIAL INFARCTION) (HCC): ICD-10-CM

## 2023-08-03 DIAGNOSIS — I25.10 CAD (CORONARY ARTERY DISEASE): Primary | ICD-10-CM

## 2023-08-03 PROBLEM — R79.89 ELEVATED TROPONIN: Status: ACTIVE | Noted: 2023-08-03

## 2023-08-03 PROBLEM — R73.03 PRE-DIABETES: Chronic | Status: ACTIVE | Noted: 2023-08-03

## 2023-08-03 LAB
ANION GAP SERPL CALCULATED.3IONS-SCNC: 13 MMOL/L (ref 7–16)
BASOPHILS # BLD: 0.07 K/UL (ref 0–0.2)
BASOPHILS NFR BLD: 1 % (ref 0–2)
BILIRUB UR QL STRIP: NEGATIVE
BNP SERPL-MCNC: <36 PG/ML (ref 0–125)
BUN SERPL-MCNC: 24 MG/DL (ref 6–23)
CALCIUM SERPL-MCNC: 9.1 MG/DL (ref 8.6–10.2)
CHLORIDE SERPL-SCNC: 105 MMOL/L (ref 98–107)
CHOLEST SERPL-MCNC: 133 MG/DL
CK SERPL-CCNC: 98 U/L (ref 20–200)
CLARITY UR: CLEAR
CO2 SERPL-SCNC: 23 MMOL/L (ref 22–29)
COLOR UR: YELLOW
COMMENT: NORMAL
CREAT SERPL-MCNC: 1.1 MG/DL (ref 0.7–1.2)
EKG ATRIAL RATE: 67 BPM
EKG ATRIAL RATE: 91 BPM
EKG P AXIS: 35 DEGREES
EKG P AXIS: 37 DEGREES
EKG P-R INTERVAL: 184 MS
EKG P-R INTERVAL: 198 MS
EKG Q-T INTERVAL: 336 MS
EKG Q-T INTERVAL: 422 MS
EKG QRS DURATION: 72 MS
EKG QRS DURATION: 78 MS
EKG QTC CALCULATION (BAZETT): 413 MS
EKG QTC CALCULATION (BAZETT): 445 MS
EKG R AXIS: -10 DEGREES
EKG R AXIS: 3 DEGREES
EKG T AXIS: 25 DEGREES
EKG T AXIS: 57 DEGREES
EKG VENTRICULAR RATE: 67 BPM
EKG VENTRICULAR RATE: 91 BPM
EOSINOPHIL # BLD: 0.26 K/UL (ref 0.05–0.5)
EOSINOPHILS RELATIVE PERCENT: 4 % (ref 0–6)
ERYTHROCYTE [DISTWIDTH] IN BLOOD BY AUTOMATED COUNT: 13.4 % (ref 11.5–15)
ERYTHROCYTE [DISTWIDTH] IN BLOOD BY AUTOMATED COUNT: 13.5 % (ref 11.5–15)
GFR SERPL CREATININE-BSD FRML MDRD: >60 ML/MIN/1.73M2
GLUCOSE SERPL-MCNC: 107 MG/DL (ref 74–99)
GLUCOSE UR STRIP-MCNC: NEGATIVE MG/DL
HBA1C MFR BLD: 5.7 % (ref 4–5.6)
HCT VFR BLD AUTO: 43.3 % (ref 37–54)
HCT VFR BLD AUTO: 44 % (ref 37–54)
HDLC SERPL-MCNC: 57 MG/DL
HGB BLD-MCNC: 14.1 G/DL (ref 12.5–16.5)
HGB BLD-MCNC: 14.4 G/DL (ref 12.5–16.5)
HGB UR QL STRIP.AUTO: NEGATIVE
IMM GRANULOCYTES # BLD AUTO: <0.03 K/UL (ref 0–0.58)
IMM GRANULOCYTES NFR BLD: 0 % (ref 0–5)
KETONES UR STRIP-MCNC: NEGATIVE MG/DL
LDLC SERPL CALC-MCNC: 34 MG/DL
LEUKOCYTE ESTERASE UR QL STRIP: NEGATIVE
LV EF: 63 %
LVEF MODALITY: NORMAL
LYMPHOCYTES NFR BLD: 2.29 K/UL (ref 1.5–4)
LYMPHOCYTES RELATIVE PERCENT: 37 % (ref 20–42)
MAGNESIUM SERPL-MCNC: 2.3 MG/DL (ref 1.6–2.6)
MCH RBC QN AUTO: 30 PG (ref 26–35)
MCH RBC QN AUTO: 30.2 PG (ref 26–35)
MCHC RBC AUTO-ENTMCNC: 32 G/DL (ref 32–34.5)
MCHC RBC AUTO-ENTMCNC: 33.3 G/DL (ref 32–34.5)
MCV RBC AUTO: 90.8 FL (ref 80–99.9)
MCV RBC AUTO: 93.6 FL (ref 80–99.9)
MONOCYTES NFR BLD: 0.67 K/UL (ref 0.1–0.95)
MONOCYTES NFR BLD: 11 % (ref 2–12)
NEUTROPHILS NFR BLD: 47 % (ref 43–80)
NEUTS SEG NFR BLD: 2.87 K/UL (ref 1.8–7.3)
NITRITE UR QL STRIP: NEGATIVE
PARTIAL THROMBOPLASTIN TIME: 32 SEC (ref 24.5–35.1)
PARTIAL THROMBOPLASTIN TIME: 40.2 SEC (ref 24.5–35.1)
PARTIAL THROMBOPLASTIN TIME: 55.6 SEC (ref 24.5–35.1)
PH UR STRIP: 6 [PH] (ref 5–9)
PLATELET # BLD AUTO: 247 K/UL (ref 130–450)
PLATELET # BLD AUTO: 263 K/UL (ref 130–450)
PMV BLD AUTO: 10 FL (ref 7–12)
PMV BLD AUTO: 10.1 FL (ref 7–12)
POTASSIUM SERPL-SCNC: 4.4 MMOL/L (ref 3.5–5)
PROT UR STRIP-MCNC: NEGATIVE MG/DL
RBC # BLD AUTO: 4.7 M/UL (ref 3.8–5.8)
RBC # BLD AUTO: 4.77 M/UL (ref 3.8–5.8)
REASON FOR REJECTION: NORMAL
SODIUM SERPL-SCNC: 141 MMOL/L (ref 132–146)
SP GR UR STRIP: 1.02 (ref 1–1.03)
SPECIMEN SOURCE: NORMAL
T4 FREE SERPL-MCNC: 1.1 NG/DL (ref 0.9–1.7)
TRIGL SERPL-MCNC: 209 MG/DL
TROPONIN I SERPL HS-MCNC: 47 NG/L (ref 0–11)
TROPONIN I SERPL HS-MCNC: 69 NG/L (ref 0–11)
TROPONIN I SERPL HS-MCNC: 83 NG/L (ref 0–11)
TSH SERPL DL<=0.05 MIU/L-ACNC: 1.33 UIU/ML (ref 0.27–4.2)
UROBILINOGEN UR STRIP-ACNC: 0.2 EU/DL (ref 0–1)
VLDLC SERPL CALC-MCNC: 42 MG/DL
WBC OTHER # BLD: 6.2 K/UL (ref 4.5–11.5)
WBC OTHER # BLD: 8.1 K/UL (ref 4.5–11.5)
ZZ NTE CLEAN UP: ORDERED TEST: NORMAL

## 2023-08-03 PROCEDURE — 93880 EXTRACRANIAL BILAT STUDY: CPT

## 2023-08-03 PROCEDURE — C1894 INTRO/SHEATH, NON-LASER: HCPCS

## 2023-08-03 PROCEDURE — 85025 COMPLETE CBC W/AUTO DIFF WBC: CPT

## 2023-08-03 PROCEDURE — 83036 HEMOGLOBIN GLYCOSYLATED A1C: CPT

## 2023-08-03 PROCEDURE — 83880 ASSAY OF NATRIURETIC PEPTIDE: CPT

## 2023-08-03 PROCEDURE — C1769 GUIDE WIRE: HCPCS

## 2023-08-03 PROCEDURE — 94729 DIFFUSING CAPACITY: CPT

## 2023-08-03 PROCEDURE — 93458 L HRT ARTERY/VENTRICLE ANGIO: CPT | Performed by: INTERNAL MEDICINE

## 2023-08-03 PROCEDURE — 71250 CT THORAX DX C-: CPT

## 2023-08-03 PROCEDURE — 86901 BLOOD TYPING SEROLOGIC RH(D): CPT

## 2023-08-03 PROCEDURE — APPSS60 APP SPLIT SHARED TIME 46-60 MINUTES: Performed by: NURSE PRACTITIONER

## 2023-08-03 PROCEDURE — 85730 THROMBOPLASTIN TIME PARTIAL: CPT

## 2023-08-03 PROCEDURE — 6360000002 HC RX W HCPCS: Performed by: INTERNAL MEDICINE

## 2023-08-03 PROCEDURE — 94640 AIRWAY INHALATION TREATMENT: CPT

## 2023-08-03 PROCEDURE — 86923 COMPATIBILITY TEST ELECTRIC: CPT

## 2023-08-03 PROCEDURE — 80061 LIPID PANEL: CPT

## 2023-08-03 PROCEDURE — 93923 UPR/LXTR ART STDY 3+ LVLS: CPT

## 2023-08-03 PROCEDURE — 99222 1ST HOSP IP/OBS MODERATE 55: CPT | Performed by: THORACIC SURGERY (CARDIOTHORACIC VASCULAR SURGERY)

## 2023-08-03 PROCEDURE — 4A023N7 MEASUREMENT OF CARDIAC SAMPLING AND PRESSURE, LEFT HEART, PERCUTANEOUS APPROACH: ICD-10-PCS | Performed by: INTERNAL MEDICINE

## 2023-08-03 PROCEDURE — 86850 RBC ANTIBODY SCREEN: CPT

## 2023-08-03 PROCEDURE — 6360000002 HC RX W HCPCS: Performed by: EMERGENCY MEDICINE

## 2023-08-03 PROCEDURE — 6370000000 HC RX 637 (ALT 250 FOR IP): Performed by: INTERNAL MEDICINE

## 2023-08-03 PROCEDURE — 2709999900 HC NON-CHARGEABLE SUPPLY

## 2023-08-03 PROCEDURE — 84443 ASSAY THYROID STIM HORMONE: CPT

## 2023-08-03 PROCEDURE — 87086 URINE CULTURE/COLONY COUNT: CPT

## 2023-08-03 PROCEDURE — 85027 COMPLETE CBC AUTOMATED: CPT

## 2023-08-03 PROCEDURE — B2111ZZ FLUOROSCOPY OF MULTIPLE CORONARY ARTERIES USING LOW OSMOLAR CONTRAST: ICD-10-PCS | Performed by: INTERNAL MEDICINE

## 2023-08-03 PROCEDURE — 93458 L HRT ARTERY/VENTRICLE ANGIO: CPT

## 2023-08-03 PROCEDURE — 2500000003 HC RX 250 WO HCPCS

## 2023-08-03 PROCEDURE — 84484 ASSAY OF TROPONIN QUANT: CPT

## 2023-08-03 PROCEDURE — B2151ZZ FLUOROSCOPY OF LEFT HEART USING LOW OSMOLAR CONTRAST: ICD-10-PCS | Performed by: INTERNAL MEDICINE

## 2023-08-03 PROCEDURE — 99222 1ST HOSP IP/OBS MODERATE 55: CPT | Performed by: INTERNAL MEDICINE

## 2023-08-03 PROCEDURE — 36415 COLL VENOUS BLD VENIPUNCTURE: CPT

## 2023-08-03 PROCEDURE — 2140000000 HC CCU INTERMEDIATE R&B

## 2023-08-03 PROCEDURE — 93010 ELECTROCARDIOGRAM REPORT: CPT | Performed by: INTERNAL MEDICINE

## 2023-08-03 PROCEDURE — 84439 ASSAY OF FREE THYROXINE: CPT

## 2023-08-03 PROCEDURE — 82550 ASSAY OF CK (CPK): CPT

## 2023-08-03 PROCEDURE — 93922 UPR/L XTREMITY ART 2 LEVELS: CPT

## 2023-08-03 PROCEDURE — 2580000003 HC RX 258: Performed by: INTERNAL MEDICINE

## 2023-08-03 PROCEDURE — 94375 RESPIRATORY FLOW VOLUME LOOP: CPT

## 2023-08-03 PROCEDURE — 83735 ASSAY OF MAGNESIUM: CPT

## 2023-08-03 PROCEDURE — 93005 ELECTROCARDIOGRAM TRACING: CPT | Performed by: INTERNAL MEDICINE

## 2023-08-03 PROCEDURE — 6360000002 HC RX W HCPCS

## 2023-08-03 PROCEDURE — 86900 BLOOD TYPING SEROLOGIC ABO: CPT

## 2023-08-03 PROCEDURE — 80048 BASIC METABOLIC PNL TOTAL CA: CPT

## 2023-08-03 PROCEDURE — 87081 CULTURE SCREEN ONLY: CPT

## 2023-08-03 PROCEDURE — 93970 EXTREMITY STUDY: CPT

## 2023-08-03 PROCEDURE — 81003 URINALYSIS AUTO W/O SCOPE: CPT

## 2023-08-03 PROCEDURE — 93306 TTE W/DOPPLER COMPLETE: CPT

## 2023-08-03 RX ORDER — FLUTICASONE PROPIONATE 50 MCG
1 SPRAY, SUSPENSION (ML) NASAL DAILY
Status: DISCONTINUED | OUTPATIENT
Start: 2023-08-03 | End: 2023-08-10 | Stop reason: HOSPADM

## 2023-08-03 RX ORDER — LORAZEPAM 0.5 MG/1
1 TABLET ORAL
Status: DISCONTINUED | OUTPATIENT
Start: 2023-08-03 | End: 2023-08-07

## 2023-08-03 RX ORDER — LORAZEPAM 2 MG/ML
2 INJECTION INTRAMUSCULAR
Status: DISCONTINUED | OUTPATIENT
Start: 2023-08-03 | End: 2023-08-07

## 2023-08-03 RX ORDER — SODIUM CHLORIDE 9 MG/ML
INJECTION, SOLUTION INTRAVENOUS PRN
Status: DISCONTINUED | OUTPATIENT
Start: 2023-08-03 | End: 2023-08-03 | Stop reason: SDUPTHER

## 2023-08-03 RX ORDER — ALLOPURINOL 100 MG/1
100 TABLET ORAL DAILY
Status: DISCONTINUED | OUTPATIENT
Start: 2023-08-03 | End: 2023-08-10 | Stop reason: HOSPADM

## 2023-08-03 RX ORDER — ACETAMINOPHEN 325 MG/1
650 TABLET ORAL EVERY 6 HOURS PRN
Status: DISCONTINUED | OUTPATIENT
Start: 2023-08-03 | End: 2023-08-03 | Stop reason: SDUPTHER

## 2023-08-03 RX ORDER — LANOLIN ALCOHOL/MO/W.PET/CERES
100 CREAM (GRAM) TOPICAL DAILY
Status: DISCONTINUED | OUTPATIENT
Start: 2023-08-03 | End: 2023-08-10 | Stop reason: HOSPADM

## 2023-08-03 RX ORDER — ALBUTEROL SULFATE 90 UG/1
2 AEROSOL, METERED RESPIRATORY (INHALATION) EVERY 6 HOURS
Status: DISCONTINUED | OUTPATIENT
Start: 2023-08-03 | End: 2023-08-03 | Stop reason: CLARIF

## 2023-08-03 RX ORDER — LORAZEPAM 0.5 MG/1
2 TABLET ORAL
Status: DISCONTINUED | OUTPATIENT
Start: 2023-08-03 | End: 2023-08-07

## 2023-08-03 RX ORDER — SODIUM CHLORIDE 0.9 % (FLUSH) 0.9 %
5-40 SYRINGE (ML) INJECTION PRN
Status: DISCONTINUED | OUTPATIENT
Start: 2023-08-03 | End: 2023-08-07

## 2023-08-03 RX ORDER — SODIUM CHLORIDE 9 MG/ML
INJECTION, SOLUTION INTRAVENOUS PRN
Status: DISCONTINUED | OUTPATIENT
Start: 2023-08-03 | End: 2023-08-06 | Stop reason: SDUPTHER

## 2023-08-03 RX ORDER — GEMFIBROZIL 600 MG/1
600 TABLET, FILM COATED ORAL
Status: DISCONTINUED | OUTPATIENT
Start: 2023-08-03 | End: 2023-08-07

## 2023-08-03 RX ORDER — LORAZEPAM 1 MG/1
4 TABLET ORAL
Status: DISCONTINUED | OUTPATIENT
Start: 2023-08-03 | End: 2023-08-07

## 2023-08-03 RX ORDER — UREA 10 %
500 LOTION (ML) TOPICAL DAILY
Status: DISCONTINUED | OUTPATIENT
Start: 2023-08-03 | End: 2023-08-07

## 2023-08-03 RX ORDER — METOPROLOL SUCCINATE 25 MG/1
12.5 TABLET, EXTENDED RELEASE ORAL DAILY
Status: DISCONTINUED | OUTPATIENT
Start: 2023-08-03 | End: 2023-08-06

## 2023-08-03 RX ORDER — LORAZEPAM 1 MG/1
3 TABLET ORAL
Status: DISCONTINUED | OUTPATIENT
Start: 2023-08-03 | End: 2023-08-07

## 2023-08-03 RX ORDER — ENOXAPARIN SODIUM 100 MG/ML
40 INJECTION SUBCUTANEOUS DAILY
Status: CANCELLED | OUTPATIENT
Start: 2023-08-03

## 2023-08-03 RX ORDER — SODIUM CHLORIDE 0.9 % (FLUSH) 0.9 %
5-40 SYRINGE (ML) INJECTION EVERY 12 HOURS SCHEDULED
Status: DISCONTINUED | OUTPATIENT
Start: 2023-08-03 | End: 2023-08-07

## 2023-08-03 RX ORDER — SODIUM CHLORIDE 9 MG/ML
500 INJECTION, SOLUTION INTRAVENOUS ONCE
Status: COMPLETED | OUTPATIENT
Start: 2023-08-03 | End: 2023-08-03

## 2023-08-03 RX ORDER — ASPIRIN 81 MG/1
81 TABLET ORAL DAILY
Status: DISCONTINUED | OUTPATIENT
Start: 2023-08-03 | End: 2023-08-07

## 2023-08-03 RX ORDER — LORAZEPAM 2 MG/ML
1 INJECTION INTRAMUSCULAR
Status: DISCONTINUED | OUTPATIENT
Start: 2023-08-03 | End: 2023-08-07

## 2023-08-03 RX ORDER — ONDANSETRON 2 MG/ML
4 INJECTION INTRAMUSCULAR; INTRAVENOUS EVERY 6 HOURS PRN
Status: DISCONTINUED | OUTPATIENT
Start: 2023-08-03 | End: 2023-08-07

## 2023-08-03 RX ORDER — LORAZEPAM 2 MG/ML
4 INJECTION INTRAMUSCULAR
Status: DISCONTINUED | OUTPATIENT
Start: 2023-08-03 | End: 2023-08-07

## 2023-08-03 RX ORDER — CHOLECALCIFEROL (VITAMIN D3) 50 MCG
4000 TABLET ORAL DAILY
Status: DISCONTINUED | OUTPATIENT
Start: 2023-08-03 | End: 2023-08-10 | Stop reason: HOSPADM

## 2023-08-03 RX ORDER — ATORVASTATIN CALCIUM 40 MG/1
40 TABLET, FILM COATED ORAL NIGHTLY
Status: DISCONTINUED | OUTPATIENT
Start: 2023-08-03 | End: 2023-08-10 | Stop reason: HOSPADM

## 2023-08-03 RX ORDER — ISOSORBIDE MONONITRATE 30 MG/1
30 TABLET, EXTENDED RELEASE ORAL DAILY
Status: COMPLETED | OUTPATIENT
Start: 2023-08-03 | End: 2023-08-06

## 2023-08-03 RX ORDER — HEPARIN SODIUM 1000 [USP'U]/ML
4000 INJECTION, SOLUTION INTRAVENOUS; SUBCUTANEOUS PRN
Status: DISCONTINUED | OUTPATIENT
Start: 2023-08-03 | End: 2023-08-07

## 2023-08-03 RX ORDER — HEPARIN SODIUM 1000 [USP'U]/ML
2000 INJECTION, SOLUTION INTRAVENOUS; SUBCUTANEOUS PRN
Status: DISCONTINUED | OUTPATIENT
Start: 2023-08-03 | End: 2023-08-07

## 2023-08-03 RX ORDER — HEPARIN SODIUM 10000 [USP'U]/100ML
5-30 INJECTION, SOLUTION INTRAVENOUS CONTINUOUS
Status: DISCONTINUED | OUTPATIENT
Start: 2023-08-03 | End: 2023-08-07

## 2023-08-03 RX ORDER — PANTOPRAZOLE SODIUM 40 MG/1
40 TABLET, DELAYED RELEASE ORAL
Status: DISCONTINUED | OUTPATIENT
Start: 2023-08-03 | End: 2023-08-07

## 2023-08-03 RX ORDER — ACETAMINOPHEN 325 MG/1
650 TABLET ORAL EVERY 4 HOURS PRN
Status: DISCONTINUED | OUTPATIENT
Start: 2023-08-03 | End: 2023-08-07

## 2023-08-03 RX ORDER — SODIUM CHLORIDE 0.9 % (FLUSH) 0.9 %
5-40 SYRINGE (ML) INJECTION PRN
Status: DISCONTINUED | OUTPATIENT
Start: 2023-08-03 | End: 2023-08-03 | Stop reason: SDUPTHER

## 2023-08-03 RX ORDER — NITROGLYCERIN 0.4 MG/1
0.4 TABLET SUBLINGUAL EVERY 5 MIN PRN
Status: DISCONTINUED | OUTPATIENT
Start: 2023-08-03 | End: 2023-08-07

## 2023-08-03 RX ORDER — ONDANSETRON 4 MG/1
4 TABLET, ORALLY DISINTEGRATING ORAL EVERY 8 HOURS PRN
Status: DISCONTINUED | OUTPATIENT
Start: 2023-08-03 | End: 2023-08-03 | Stop reason: SDUPTHER

## 2023-08-03 RX ORDER — ALBUTEROL SULFATE 2.5 MG/3ML
2.5 SOLUTION RESPIRATORY (INHALATION) EVERY 6 HOURS PRN
Status: DISCONTINUED | OUTPATIENT
Start: 2023-08-03 | End: 2023-08-07

## 2023-08-03 RX ORDER — FOLIC ACID 1 MG/1
1 TABLET ORAL DAILY
Status: DISCONTINUED | OUTPATIENT
Start: 2023-08-03 | End: 2023-08-07

## 2023-08-03 RX ORDER — BISACODYL 5 MG/1
5 TABLET, DELAYED RELEASE ORAL DAILY PRN
Status: DISCONTINUED | OUTPATIENT
Start: 2023-08-03 | End: 2023-08-07

## 2023-08-03 RX ORDER — ACETAMINOPHEN 650 MG/1
650 SUPPOSITORY RECTAL EVERY 6 HOURS PRN
Status: DISCONTINUED | OUTPATIENT
Start: 2023-08-03 | End: 2023-08-03 | Stop reason: SDUPTHER

## 2023-08-03 RX ORDER — ALBUTEROL SULFATE 2.5 MG/3ML
2.5 SOLUTION RESPIRATORY (INHALATION)
Status: DISCONTINUED | OUTPATIENT
Start: 2023-08-03 | End: 2023-08-03

## 2023-08-03 RX ORDER — ONDANSETRON 2 MG/ML
4 INJECTION INTRAMUSCULAR; INTRAVENOUS EVERY 6 HOURS PRN
Status: DISCONTINUED | OUTPATIENT
Start: 2023-08-03 | End: 2023-08-03 | Stop reason: SDUPTHER

## 2023-08-03 RX ORDER — SODIUM CHLORIDE 0.9 % (FLUSH) 0.9 %
5-40 SYRINGE (ML) INJECTION EVERY 12 HOURS SCHEDULED
Status: DISCONTINUED | OUTPATIENT
Start: 2023-08-03 | End: 2023-08-03 | Stop reason: SDUPTHER

## 2023-08-03 RX ORDER — LORAZEPAM 2 MG/ML
3 INJECTION INTRAMUSCULAR
Status: DISCONTINUED | OUTPATIENT
Start: 2023-08-03 | End: 2023-08-07

## 2023-08-03 RX ORDER — LISINOPRIL 20 MG/1
20 TABLET ORAL DAILY
Status: DISCONTINUED | OUTPATIENT
Start: 2023-08-03 | End: 2023-08-04

## 2023-08-03 RX ORDER — LISINOPRIL 20 MG/1
20 TABLET ORAL DAILY
Status: ON HOLD | COMMUNITY
End: 2023-08-10 | Stop reason: HOSPADM

## 2023-08-03 RX ORDER — M-VIT,TX,IRON,MINS/CALC/FOLIC 27MG-0.4MG
1 TABLET ORAL DAILY
Status: DISCONTINUED | OUTPATIENT
Start: 2023-08-03 | End: 2023-08-10 | Stop reason: HOSPADM

## 2023-08-03 RX ORDER — HEPARIN SODIUM 1000 [USP'U]/ML
4000 INJECTION, SOLUTION INTRAVENOUS; SUBCUTANEOUS ONCE
Status: COMPLETED | OUTPATIENT
Start: 2023-08-03 | End: 2023-08-03

## 2023-08-03 RX ORDER — OMEGA-3-ACID ETHYL ESTERS 1 G/1
2 CAPSULE, LIQUID FILLED ORAL 2 TIMES DAILY
Status: DISCONTINUED | OUTPATIENT
Start: 2023-08-03 | End: 2023-08-07

## 2023-08-03 RX ADMIN — Medication 4000 UNITS: at 12:54

## 2023-08-03 RX ADMIN — SODIUM CHLORIDE, PRESERVATIVE FREE 10 ML: 5 INJECTION INTRAVENOUS at 20:38

## 2023-08-03 RX ADMIN — METOPROLOL SUCCINATE 12.5 MG: 25 TABLET, EXTENDED RELEASE ORAL at 12:54

## 2023-08-03 RX ADMIN — ISOSORBIDE MONONITRATE 30 MG: 30 TABLET, EXTENDED RELEASE ORAL at 12:54

## 2023-08-03 RX ADMIN — ALBUTEROL SULFATE 2.5 MG: 2.5 SOLUTION RESPIRATORY (INHALATION) at 11:56

## 2023-08-03 RX ADMIN — ATORVASTATIN CALCIUM 40 MG: 40 TABLET, FILM COATED ORAL at 20:37

## 2023-08-03 RX ADMIN — ALLOPURINOL 100 MG: 100 TABLET ORAL at 12:54

## 2023-08-03 RX ADMIN — SODIUM CHLORIDE 500 ML: 9 INJECTION, SOLUTION INTRAVENOUS at 11:24

## 2023-08-03 RX ADMIN — Medication 100 MG: at 17:58

## 2023-08-03 RX ADMIN — GEMFIBROZIL 600 MG: 600 TABLET ORAL at 22:14

## 2023-08-03 RX ADMIN — HEPARIN SODIUM 12 UNITS/KG/HR: 10000 INJECTION, SOLUTION INTRAVENOUS at 02:21

## 2023-08-03 RX ADMIN — FOLIC ACID 1 MG: 1 TABLET ORAL at 17:58

## 2023-08-03 RX ADMIN — ALBUTEROL SULFATE 2.5 MG: 2.5 SOLUTION RESPIRATORY (INHALATION) at 05:08

## 2023-08-03 RX ADMIN — Medication 500 MG: at 22:14

## 2023-08-03 RX ADMIN — HEPARIN SODIUM 4000 UNITS: 1000 INJECTION INTRAVENOUS; SUBCUTANEOUS at 02:18

## 2023-08-03 RX ADMIN — PSYLLIUM HUSK 1 PACKET: 3.4 POWDER ORAL at 20:37

## 2023-08-03 RX ADMIN — HEPARIN SODIUM 2000 UNITS: 1000 INJECTION INTRAVENOUS; SUBCUTANEOUS at 19:12

## 2023-08-03 RX ADMIN — MULTIPLE VITAMINS W/ MINERALS TAB 1 TABLET: TAB at 12:54

## 2023-08-03 RX ADMIN — OMEGA-3-ACID ETHYL ESTERS 2 CAPSULE: 1 CAPSULE, LIQUID FILLED ORAL at 20:37

## 2023-08-03 RX ADMIN — ACETAMINOPHEN 650 MG: 325 TABLET ORAL at 22:22

## 2023-08-03 ASSESSMENT — PAIN DESCRIPTION - LOCATION
LOCATION: HEAD
LOCATION: ARM

## 2023-08-03 ASSESSMENT — PAIN SCALES - GENERAL
PAINLEVEL_OUTOF10: 2
PAINLEVEL_OUTOF10: 0
PAINLEVEL_OUTOF10: 5

## 2023-08-03 ASSESSMENT — PAIN DESCRIPTION - ORIENTATION: ORIENTATION: LEFT

## 2023-08-03 ASSESSMENT — PAIN DESCRIPTION - DESCRIPTORS
DESCRIPTORS: ACHING
DESCRIPTORS: ACHING

## 2023-08-03 NOTE — H&P
Hospital Medicine History & Physical      PCP: Alex Rosales DO    Date of Admission: 8/3/2023    Date of Service: . AUG 3, 2023    Chief Complaint:  CHEST PAIN      History Of Present Illness:     75 y.o. male presented with  BACK PAIN. ONSET LAST PM, DULL IN CHARACTER, LOCATED BACK RADITIATING TO THE FRONT, AND  DOWN HIS LEFT ARM, NO NV, NO SOB, NO DIAPHORESIS  NEVER HAD BEFORE     Past Medical History:          Diagnosis Date    Abnormal stress test 4/5/2017    [facility])     Testicular    Chronic back pain     Hyperlipidemia     Hypertension     PAF (paroxysmal atrial fibrillation) ([address]) 4/5/2017    Pre-diabetes 8/3/2023       Past Surgical History:          Procedure Laterality Date    CARDIAC CATHETERIZATION  08/03/2023    DR Stan Boyd    COLONOSCOPY      LYMPH NODE BIOPSY      ROTATOR CUFF REPAIR      SMALL INTESTINE SURGERY      SPINE SURGERY         Medications Prior to Admission:      Prior to Admission medications    Medication Sig Start Date End Date Taking?  Authorizing Provider   lisinopril (PRINIVIL;ZESTRIL) 20 MG tablet Take 1 tablet by mouth daily   Yes Historical Provider, MD   albuterol sulfate  (90 Base) MCG/ACT inhaler Inhale 2 puffs into the lungs 4 times daily  Patient not taking: Reported on 8/3/2023 9/3/21   SUSY Hale CNP   fluticasone (FLONASE) 50 MCG/ACT nasal spray 1 spray by Each Nostril route daily 9/3/21   SUSY Hale CNP   pantoprazole (PROTONIX) 40 MG tablet Take 1 tablet by mouth every morning (before breakfast)  Patient not taking: Reported on 8/3/2023 9/4/21   SUSY Hale CNP   gemfibrozil (LOPID) 600 MG tablet Take 1 tablet by mouth 2 times daily (before meals)    Historical Provider, MD   atorvastatin (LIPITOR) 40 MG tablet TAKE 1 TABLET BY MOUTH EVERY DAY 1/25/21   Claudia Spann MD   colchicine

## 2023-08-03 NOTE — ED NOTES
Department of Emergency Medicine  FIRST PROVIDER TRIAGE NOTE             Independent MLP           8/2/23  9:23 PM EDT    Date of Encounter: 8/2/23   MRN: [id number]      HPI: Karena Chapman is a 75 y.o. male who presents to the ED for Back Pain (Left side back pain radiating down left arm nausea onset 190 today -THINNERS deies chest pain sob diarrhea fever 324 mg ASA PTA at 2000)  Per patient he had pain earlier in his left scapula, down his left arm. Denies chest pain, shortness of breath, or dizziness. ROS: Negative for cp or sob. PE: Gen Appearance/Constitutional: alert  CV: regular rate     Initial Plan of Care: All treatment areas with department are currently occupied. Plan to order/Initiate the following while awaiting opening in ED: labs, EKG, and imaging studies.   Initiate Treatment-Testing, Proceed toTreatment Area When Bed Available for ED Attending/MLP to Continue Care    Electronically signed by SUSY Spivey CNP   DD: 8/2/23       SUSY Spivey CNP  08/02/23 2750  ATTENDING PROVIDER ATTESTATION:     Supervising Physician, on-site, available for consultation, non-participatory in the evaluation or care of this patient       Almyra Rinne, MD  08/02/23 2193

## 2023-08-03 NOTE — ED NOTES
Nurse to nurse given to 6W. Patient will be brought up to unit by RN.       Laurel Washington RN  08/03/23 5668

## 2023-08-03 NOTE — CONSULTS
CTS Consult    Patient name: Gen Kumar    Reason for consult: CAD    Primary Care Physician: Francis Rubin DO    Date of service: 8/3/2023    Chief Complaint: Chest pain    HPI: 74 yo male admitted with NSTEMI found to have severe MVCAD. His medical history includes HTN, HLD, remote tobacco use, Hx EtOH, PAF (diagnosed in 03/2017, no further episodes), on ASA therapy (previously never on [address]), testicular CA s/p orchiectomy and lymph node dissection (in remission), and family Hx premature CAD.     Allergies: No Known Allergies    Home medications:    Current Facility-Administered Medications   Medication Dose Route Frequency Provider Last Rate Last Admin    bisacodyl (DULCOLAX) EC tablet 5 mg  5 mg Oral Daily PRN Maty Arora MD        allopurinol (ZYLOPRIM) tablet 100 mg  100 mg Oral Daily Maty Arora MD   100 mg at 08/03/23 1254    aspirin EC tablet 81 mg  81 mg Oral Daily Maty Arora MD        atorvastatin (LIPITOR) tablet 40 mg  40 mg Oral Nightly Maty Arora MD        fluticasone (FLONASE) 50 MCG/ACT nasal spray 1 spray  1 spray Each Nostril Daily Maty Arora MD        gemfibrozil (LOPID) tablet 600 mg  600 mg Oral BID AC Kimberly Berkowitz MD        Magnesium Gluconate tablet 500 mg  500 mg Oral Daily Maty Arora MD        therapeutic multivitamin-minerals 1 tablet  1 tablet Oral Daily Maty Arora MD   1 tablet at 08/03/23 1254    pantoprazole (PROTONIX) tablet 40 mg  40 mg Oral QAM AC Maty Arora MD        psyllium (METAMUCIL) 58.12 % packet 1 packet  1 packet Oral Daily Maty Arora MD        omega-3 acid ethyl esters (LOVAZA) capsule 2 capsule  2 capsule Oral BID Maty Arora MD        vitamin D (CHOLECALCIFEROL) tablet 4,000 Units  4,000 Units Oral Daily Maty Arora MD   4,000 Units at 08/03/23 1254    heparin (porcine) injection 4,000 Units  4,000 Units IntraVENous PRN Maty Arora MD        heparin (porcine) injection 2,000 Units  2,000 Units IntraVENous PRN

## 2023-08-03 NOTE — PROCEDURES
[address], [address] [place] [address]                            CARDIAC CATHETERIZATION    PATIENT NAME: Lucio Soto                :        1955  MED REC NO:   [id number]                            ROOM:       [id number]  ACCOUNT NO:   [de-identified]                           ADMIT DATE: 2023  PROVIDER:     Alireza Sales MD    DATE OF PROCEDURE:  2023    PROCEDURES:  Left heart catheterization, selective coronary angiography  and left ventriculography. The procedure was done through right radial approach using ultrasound  guidance. The patient received intravenous Versed and intravenous fentanyl for  sedation. INDICATION:  Non-ST-elevation myocardial infarction. AUC:  8-3. PRESSURES:  Aorta 93/66 with a mean of 78. Left ventricular systolic pressure 858, left ventricular end-diastolic  pressure 11. There was no gradient across the aortic valve. CORONARY ANGIOGRAPHY:  Left main:  The left main artery is calcified with around 10% ostial  narrowing. LAD:  The left anterior descending artery is calcified in its proximal  segment. There is 90% ostial LAD disease. There is around 50% hazy  stenosis in the proximal third of the LAD at the site of takeoff of two  diagonal branches. The ostium of the first diagonal branch that takes  off from that site has around 70% luminal narrowing: This is a small  caliber vessel. The ostium of the second diagonal branch that takes off  at that site also has around 70% ostial narrowing. The distal LAD after  the terminal diagonal branch becomes a very small caliber vessel with  the apical LAD appearing a ghost vessel measuring less than 0.5 mm in  diameter. LCX:  The left circumflex is a large, but nondominant vessel. There is  an eccentric 50% stenosis of the large first marginal branch.   The AV  groove branch and the other small obtuse marginal branches

## 2023-08-03 NOTE — CONSULTS
Inpatient Cardiology Consultation      Reason for Consult:  Back Pain, Elevated Troponin, Positive Delta Troponin, Positive NSTEMI    Consulting Physician: Dr. Gerardo Barnhart    Requesting Physician:  SUSY Witt    Date of Consultation: 8/3/2023    HISTORY OF PRESENT ILLNESS:   Mr. Dustin Dang is a 75year old  male who was most recently seen in consultation with Dr. Lola Engle on 02/22/2020. His medical history includes HTN, HLD, remote tobacco use, Hx EtOH, PAF (diagnosed in 03/2017, no further episodes), on ASA therapy (previously never on [facility]), testicular CA s/p orchiectomy and lymph node dissection (in remission), and family Hx premature CAD. Mr. Dustin Dang presented to [facility] ED on 08/02/2023 with complaints of chest pain. He states that prior to presentation [place] when he developed left upper back pain with radiation to his left shoulder, left chest, and down his left arm. He states that his discomfort was a 7-8/10. He denies accompanying dyspnea or diaphoresis, or nausea. Approximately an hour before he arrived to the ED his family stopped to purchase ASA and Motrin. He received 324 mg ASA, Zofran, and 2 Motrin. Upon arrival to the ED his VS were oral temperature 97.8-94-18-90 4% RA-120/74. EKG SR with nonspecific ST abnormality. Troponin 29, 47, 69. CTA of the chest was unremarkable. CT of the head unremarkable. CXR with mild atelectasis/pneumonitis to the left lower lobe. WBC 8.5. H&H 14.8/44. 4.  BUN/SCR 21/1.2. .  K4.1. He received Heparin 4000 unit bolus and was placed on an IV Heparin infusion. He was admitted to a telemetry monitored unit. Cardiology was consulted for management of NSTEMI. Currently, Mr. Dustin Dang is sitting up in bed and denies pain to his left chest, shoulder or arm at this time.   He states that all of his pains dissipated soon after presenting to the ED and has not resumed this a.m. even after

## 2023-08-04 ENCOUNTER — ANESTHESIA EVENT (OUTPATIENT)
Dept: OPERATING ROOM | Age: 68
DRG: 234 | End: 2023-08-04
Payer: MEDICARE

## 2023-08-04 PROBLEM — I25.10 CAD (CORONARY ARTERY DISEASE): Status: ACTIVE | Noted: 2023-08-02

## 2023-08-04 LAB
ALBUMIN SERPL-MCNC: 4.1 G/DL (ref 3.5–5.2)
ALP SERPL-CCNC: 66 U/L (ref 40–129)
ALT SERPL-CCNC: 19 U/L (ref 0–40)
ANION GAP SERPL CALCULATED.3IONS-SCNC: 14 MMOL/L (ref 7–16)
AST SERPL-CCNC: 27 U/L (ref 0–39)
BILIRUB DIRECT SERPL-MCNC: 0.2 MG/DL (ref 0–0.3)
BILIRUB INDIRECT SERPL-MCNC: 0.4 MG/DL (ref 0–1)
BILIRUB SERPL-MCNC: 0.6 MG/DL (ref 0–1.2)
BUN SERPL-MCNC: 15 MG/DL (ref 6–23)
CALCIUM SERPL-MCNC: 9.3 MG/DL (ref 8.6–10.2)
CHLORIDE SERPL-SCNC: 106 MMOL/L (ref 98–107)
CO2 SERPL-SCNC: 21 MMOL/L (ref 22–29)
CREAT SERPL-MCNC: 0.9 MG/DL (ref 0.7–1.2)
GFR SERPL CREATININE-BSD FRML MDRD: >60 ML/MIN/1.73M2
GLUCOSE SERPL-MCNC: 118 MG/DL (ref 74–99)
PARTIAL THROMBOPLASTIN TIME: 56.7 SEC (ref 24.5–35.1)
PARTIAL THROMBOPLASTIN TIME: 68.4 SEC (ref 24.5–35.1)
POTASSIUM SERPL-SCNC: 4.3 MMOL/L (ref 3.5–5)
PROT SERPL-MCNC: 6.5 G/DL (ref 6.4–8.3)
SODIUM SERPL-SCNC: 141 MMOL/L (ref 132–146)

## 2023-08-04 PROCEDURE — 6370000000 HC RX 637 (ALT 250 FOR IP): Performed by: INTERNAL MEDICINE

## 2023-08-04 PROCEDURE — 82248 BILIRUBIN DIRECT: CPT

## 2023-08-04 PROCEDURE — 2580000003 HC RX 258: Performed by: INTERNAL MEDICINE

## 2023-08-04 PROCEDURE — 99232 SBSQ HOSP IP/OBS MODERATE 35: CPT | Performed by: STUDENT IN AN ORGANIZED HEALTH CARE EDUCATION/TRAINING PROGRAM

## 2023-08-04 PROCEDURE — 80053 COMPREHEN METABOLIC PANEL: CPT

## 2023-08-04 PROCEDURE — 99233 SBSQ HOSP IP/OBS HIGH 50: CPT | Performed by: INTERNAL MEDICINE

## 2023-08-04 PROCEDURE — 85730 THROMBOPLASTIN TIME PARTIAL: CPT

## 2023-08-04 PROCEDURE — 2140000000 HC CCU INTERMEDIATE R&B

## 2023-08-04 PROCEDURE — 36415 COLL VENOUS BLD VENIPUNCTURE: CPT

## 2023-08-04 PROCEDURE — 6360000002 HC RX W HCPCS: Performed by: INTERNAL MEDICINE

## 2023-08-04 RX ADMIN — ATORVASTATIN CALCIUM 40 MG: 40 TABLET, FILM COATED ORAL at 20:17

## 2023-08-04 RX ADMIN — OMEGA-3-ACID ETHYL ESTERS 2 CAPSULE: 1 CAPSULE, LIQUID FILLED ORAL at 08:32

## 2023-08-04 RX ADMIN — FOLIC ACID 1 MG: 1 TABLET ORAL at 08:32

## 2023-08-04 RX ADMIN — ALLOPURINOL 100 MG: 100 TABLET ORAL at 08:32

## 2023-08-04 RX ADMIN — HEPARIN SODIUM 14 UNITS/KG/HR: 10000 INJECTION, SOLUTION INTRAVENOUS at 23:38

## 2023-08-04 RX ADMIN — BISACODYL 5 MG: 5 TABLET, COATED ORAL at 14:40

## 2023-08-04 RX ADMIN — Medication 100 MG: at 08:32

## 2023-08-04 RX ADMIN — Medication 4000 UNITS: at 08:32

## 2023-08-04 RX ADMIN — ISOSORBIDE MONONITRATE 30 MG: 30 TABLET, EXTENDED RELEASE ORAL at 11:44

## 2023-08-04 RX ADMIN — Medication 500 MG: at 08:32

## 2023-08-04 RX ADMIN — GEMFIBROZIL 600 MG: 600 TABLET ORAL at 17:19

## 2023-08-04 RX ADMIN — ASPIRIN 81 MG: 81 TABLET, COATED ORAL at 08:32

## 2023-08-04 RX ADMIN — SODIUM CHLORIDE, PRESERVATIVE FREE 10 ML: 5 INJECTION INTRAVENOUS at 08:35

## 2023-08-04 RX ADMIN — HEPARIN SODIUM 14 UNITS/KG/HR: 10000 INJECTION, SOLUTION INTRAVENOUS at 02:19

## 2023-08-04 RX ADMIN — METOPROLOL SUCCINATE 12.5 MG: 25 TABLET, EXTENDED RELEASE ORAL at 08:33

## 2023-08-04 RX ADMIN — PSYLLIUM HUSK 1 PACKET: 3.4 POWDER ORAL at 08:34

## 2023-08-04 RX ADMIN — ACETAMINOPHEN 650 MG: 325 TABLET ORAL at 23:28

## 2023-08-04 RX ADMIN — PANTOPRAZOLE SODIUM 40 MG: 40 TABLET, DELAYED RELEASE ORAL at 06:28

## 2023-08-04 RX ADMIN — GEMFIBROZIL 600 MG: 600 TABLET ORAL at 06:28

## 2023-08-04 RX ADMIN — MULTIPLE VITAMINS W/ MINERALS TAB 1 TABLET: TAB at 08:32

## 2023-08-04 RX ADMIN — LORAZEPAM 1 MG: 0.5 TABLET ORAL at 23:35

## 2023-08-04 RX ADMIN — OMEGA-3-ACID ETHYL ESTERS 2 CAPSULE: 1 CAPSULE, LIQUID FILLED ORAL at 20:17

## 2023-08-04 RX ADMIN — SODIUM CHLORIDE, PRESERVATIVE FREE 10 ML: 5 INJECTION INTRAVENOUS at 20:18

## 2023-08-04 ASSESSMENT — PAIN SCALES - GENERAL
PAINLEVEL_OUTOF10: 0
PAINLEVEL_OUTOF10: 5
PAINLEVEL_OUTOF10: 0
PAINLEVEL_OUTOF10: 0

## 2023-08-04 ASSESSMENT — PAIN DESCRIPTION - LOCATION: LOCATION: HEAD

## 2023-08-04 ASSESSMENT — PAIN DESCRIPTION - DESCRIPTORS: DESCRIPTORS: ACHING

## 2023-08-04 NOTE — PLAN OF CARE
Met with the patient and his family and discussed expectations of inpatient rehab following open heart surgery. He would like to go straight home post op and not to inpatient rehab. He knows that he has to walk 400 ft without wearing O2 while steady on his feet; climb a full flight of steps, as that is what is required for him to go to his bathroom at home. We look forward to working with him after surgery.

## 2023-08-04 NOTE — CARE COORDINATION
08/04/23 Transition of care: Chart reviewed> Patient is post heart cath with findings of mutli vessel disease and need for CABG. He is alert and oriented. He resides with his spouse. He follows with Dr Alejandra Orosco. Per CTS notes patient will be on the schedule for Monday. He has met with cardiac rehab and has started discharge planning. He hopes to return home post recovery. Will follow post procedure. He does not have a preference of home care if needed. Electronically signed by Kenji Rust RN CM on 8/4/2023 at 3:27 PM       Case Management Assessment  Initial Evaluation    Date/Time of Evaluation: 8/4/2023 3:28 PM  Assessment Completed by: Kenji Rust RN    If patient is discharged prior to next notation, then this note serves as note for discharge by case management. Patient Name: Pawan Meeks                   YOB: 1955  Diagnosis: Elevated troponin [R77.8]  Non-STEMI (non-ST elevated myocardial infarction) [facility]) [I21.4]  NSTEMI (non-ST elevated myocardial infarction) ([address]) [I21.4]  Acute left-sided thoracic back pain [M54.6]                   Date / Time: 8/3/2023 12:24 AM    Patient Admission Status: Inpatient   Readmission Risk (Low < 19, Mod (19-27), High > 27): Readmission Risk Score: 6.9    Current PCP: Isiah Oliver, DO  PCP verified by CM? Yes    Chart Reviewed: Yes      History Provided by: Patient  Patient Orientation: Alert and Oriented    Patient Cognition: Alert    Hospitalization in the last 30 days (Readmission):  No    If yes, Readmission Assessment in  Navigator will be completed.     Advance Directives:      Code Status: Full Code   Patient's Primary Decision Maker is: Legal Next of Kin    Primary Decision Maker: Mirza Taylor - Emely - [phone]    Secondary Decision Maker: Zenobia Sarmiento - [phone]    Discharge Planning:    Patient lives with: Spouse/Significant Other Type of Home: House  Primary Care Giver: Self  Patient Support Systems

## 2023-08-05 LAB
ANION GAP SERPL CALCULATED.3IONS-SCNC: 14 MMOL/L (ref 7–16)
BUN SERPL-MCNC: 18 MG/DL (ref 6–23)
CALCIUM SERPL-MCNC: 9.7 MG/DL (ref 8.6–10.2)
CHLORIDE SERPL-SCNC: 102 MMOL/L (ref 98–107)
CO2 SERPL-SCNC: 22 MMOL/L (ref 22–29)
CREAT SERPL-MCNC: 0.8 MG/DL (ref 0.7–1.2)
ERYTHROCYTE [DISTWIDTH] IN BLOOD BY AUTOMATED COUNT: 13.1 % (ref 11.5–15)
GFR SERPL CREATININE-BSD FRML MDRD: >60 ML/MIN/1.73M2
GLUCOSE SERPL-MCNC: 108 MG/DL (ref 74–99)
HCT VFR BLD AUTO: 41 % (ref 37–54)
HGB BLD-MCNC: 13.6 G/DL (ref 12.5–16.5)
MCH RBC QN AUTO: 30 PG (ref 26–35)
MCHC RBC AUTO-ENTMCNC: 33.2 G/DL (ref 32–34.5)
MCV RBC AUTO: 90.5 FL (ref 80–99.9)
MICROORGANISM SPEC CULT: NO GROWTH
MICROORGANISM SPEC CULT: NORMAL
PARTIAL THROMBOPLASTIN TIME: 51.8 SEC (ref 24.5–35.1)
PLATELET # BLD AUTO: 245 K/UL (ref 130–450)
PMV BLD AUTO: 10 FL (ref 7–12)
POTASSIUM SERPL-SCNC: 4 MMOL/L (ref 3.5–5)
RBC # BLD AUTO: 4.53 M/UL (ref 3.8–5.8)
SODIUM SERPL-SCNC: 138 MMOL/L (ref 132–146)
SPECIMEN DESCRIPTION: NORMAL
SPECIMEN DESCRIPTION: NORMAL
WBC OTHER # BLD: 5.7 K/UL (ref 4.5–11.5)

## 2023-08-05 PROCEDURE — 85730 THROMBOPLASTIN TIME PARTIAL: CPT

## 2023-08-05 PROCEDURE — 2140000000 HC CCU INTERMEDIATE R&B

## 2023-08-05 PROCEDURE — 6360000002 HC RX W HCPCS: Performed by: INTERNAL MEDICINE

## 2023-08-05 PROCEDURE — 99233 SBSQ HOSP IP/OBS HIGH 50: CPT | Performed by: INTERNAL MEDICINE

## 2023-08-05 PROCEDURE — 6370000000 HC RX 637 (ALT 250 FOR IP): Performed by: INTERNAL MEDICINE

## 2023-08-05 PROCEDURE — 99232 SBSQ HOSP IP/OBS MODERATE 35: CPT | Performed by: STUDENT IN AN ORGANIZED HEALTH CARE EDUCATION/TRAINING PROGRAM

## 2023-08-05 PROCEDURE — 36415 COLL VENOUS BLD VENIPUNCTURE: CPT

## 2023-08-05 PROCEDURE — 2580000003 HC RX 258: Performed by: INTERNAL MEDICINE

## 2023-08-05 PROCEDURE — 80048 BASIC METABOLIC PNL TOTAL CA: CPT

## 2023-08-05 PROCEDURE — 85027 COMPLETE CBC AUTOMATED: CPT

## 2023-08-05 RX ADMIN — ACETAMINOPHEN 650 MG: 325 TABLET ORAL at 22:21

## 2023-08-05 RX ADMIN — BISACODYL 5 MG: 5 TABLET, COATED ORAL at 09:18

## 2023-08-05 RX ADMIN — HEPARIN SODIUM 14 UNITS/KG/HR: 10000 INJECTION, SOLUTION INTRAVENOUS at 22:26

## 2023-08-05 RX ADMIN — PSYLLIUM HUSK 1 PACKET: 3.4 POWDER ORAL at 09:21

## 2023-08-05 RX ADMIN — FOLIC ACID 1 MG: 1 TABLET ORAL at 09:18

## 2023-08-05 RX ADMIN — SODIUM CHLORIDE, PRESERVATIVE FREE 10 ML: 5 INJECTION INTRAVENOUS at 09:21

## 2023-08-05 RX ADMIN — ALLOPURINOL 100 MG: 100 TABLET ORAL at 09:19

## 2023-08-05 RX ADMIN — OMEGA-3-ACID ETHYL ESTERS 2 CAPSULE: 1 CAPSULE, LIQUID FILLED ORAL at 20:24

## 2023-08-05 RX ADMIN — Medication 100 MG: at 09:18

## 2023-08-05 RX ADMIN — GEMFIBROZIL 600 MG: 600 TABLET ORAL at 06:38

## 2023-08-05 RX ADMIN — OMEGA-3-ACID ETHYL ESTERS 2 CAPSULE: 1 CAPSULE, LIQUID FILLED ORAL at 09:18

## 2023-08-05 RX ADMIN — ISOSORBIDE MONONITRATE 30 MG: 30 TABLET, EXTENDED RELEASE ORAL at 09:18

## 2023-08-05 RX ADMIN — MULTIPLE VITAMINS W/ MINERALS TAB 1 TABLET: TAB at 09:17

## 2023-08-05 RX ADMIN — Medication 4000 UNITS: at 09:19

## 2023-08-05 RX ADMIN — SODIUM CHLORIDE, PRESERVATIVE FREE 10 ML: 5 INJECTION INTRAVENOUS at 20:25

## 2023-08-05 RX ADMIN — Medication 500 MG: at 09:17

## 2023-08-05 RX ADMIN — ATORVASTATIN CALCIUM 40 MG: 40 TABLET, FILM COATED ORAL at 20:23

## 2023-08-05 RX ADMIN — PANTOPRAZOLE SODIUM 40 MG: 40 TABLET, DELAYED RELEASE ORAL at 06:39

## 2023-08-05 RX ADMIN — LORAZEPAM 1 MG: 2 INJECTION INTRAMUSCULAR; INTRAVENOUS at 22:20

## 2023-08-05 RX ADMIN — ASPIRIN 81 MG: 81 TABLET, COATED ORAL at 09:19

## 2023-08-05 RX ADMIN — GEMFIBROZIL 600 MG: 600 TABLET ORAL at 16:17

## 2023-08-05 RX ADMIN — METOPROLOL SUCCINATE 12.5 MG: 25 TABLET, EXTENDED RELEASE ORAL at 09:18

## 2023-08-05 ASSESSMENT — PAIN SCALES - GENERAL
PAINLEVEL_OUTOF10: 0

## 2023-08-06 LAB
ALBUMIN SERPL-MCNC: 4.8 G/DL (ref 3.5–5.2)
ALP SERPL-CCNC: 75 U/L (ref 40–129)
ALT SERPL-CCNC: 40 U/L (ref 0–40)
ANION GAP SERPL CALCULATED.3IONS-SCNC: 12 MMOL/L (ref 7–16)
AST SERPL-CCNC: 46 U/L (ref 0–39)
BASOPHILS # BLD: 0.09 K/UL (ref 0–0.2)
BASOPHILS NFR BLD: 2 % (ref 0–2)
BILIRUB DIRECT SERPL-MCNC: <0.2 MG/DL (ref 0–0.3)
BILIRUB INDIRECT SERPL-MCNC: ABNORMAL MG/DL (ref 0–1)
BILIRUB SERPL-MCNC: 0.6 MG/DL (ref 0–1.2)
BUN SERPL-MCNC: 18 MG/DL (ref 6–23)
CALCIUM SERPL-MCNC: 9.9 MG/DL (ref 8.6–10.2)
CHLORIDE SERPL-SCNC: 103 MMOL/L (ref 98–107)
CO2 SERPL-SCNC: 25 MMOL/L (ref 22–29)
CREAT SERPL-MCNC: 0.8 MG/DL (ref 0.7–1.2)
EOSINOPHIL # BLD: 0.26 K/UL (ref 0.05–0.5)
EOSINOPHILS RELATIVE PERCENT: 4 % (ref 0–6)
ERYTHROCYTE [DISTWIDTH] IN BLOOD BY AUTOMATED COUNT: 13 % (ref 11.5–15)
GFR SERPL CREATININE-BSD FRML MDRD: >60 ML/MIN/1.73M2
GLUCOSE SERPL-MCNC: 116 MG/DL (ref 74–99)
HCT VFR BLD AUTO: 43.9 % (ref 37–54)
HGB BLD-MCNC: 14.7 G/DL (ref 12.5–16.5)
IMM GRANULOCYTES # BLD AUTO: <0.03 K/UL (ref 0–0.58)
IMM GRANULOCYTES NFR BLD: 0 % (ref 0–5)
INR PPP: 1
LYMPHOCYTES NFR BLD: 1.93 K/UL (ref 1.5–4)
LYMPHOCYTES RELATIVE PERCENT: 33 % (ref 20–42)
MCH RBC QN AUTO: 29.8 PG (ref 26–35)
MCHC RBC AUTO-ENTMCNC: 33.5 G/DL (ref 32–34.5)
MCV RBC AUTO: 89 FL (ref 80–99.9)
MONOCYTES NFR BLD: 0.6 K/UL (ref 0.1–0.95)
MONOCYTES NFR BLD: 10 % (ref 2–12)
NEUTROPHILS NFR BLD: 51 % (ref 43–80)
NEUTS SEG NFR BLD: 2.96 K/UL (ref 1.8–7.3)
PARTIAL THROMBOPLASTIN TIME: 51.1 SEC (ref 24.5–35.1)
PLATELET # BLD AUTO: 264 K/UL (ref 130–450)
PMV BLD AUTO: 10 FL (ref 7–12)
POTASSIUM SERPL-SCNC: 4.3 MMOL/L (ref 3.5–5)
PROT SERPL-MCNC: 7.5 G/DL (ref 6.4–8.3)
PROTHROMBIN TIME: 11 SEC (ref 9.3–12.4)
RBC # BLD AUTO: 4.93 M/UL (ref 3.8–5.8)
SODIUM SERPL-SCNC: 140 MMOL/L (ref 132–146)
WBC OTHER # BLD: 5.9 K/UL (ref 4.5–11.5)

## 2023-08-06 PROCEDURE — 99232 SBSQ HOSP IP/OBS MODERATE 35: CPT | Performed by: STUDENT IN AN ORGANIZED HEALTH CARE EDUCATION/TRAINING PROGRAM

## 2023-08-06 PROCEDURE — 99233 SBSQ HOSP IP/OBS HIGH 50: CPT | Performed by: INTERNAL MEDICINE

## 2023-08-06 PROCEDURE — 6370000000 HC RX 637 (ALT 250 FOR IP): Performed by: INTERNAL MEDICINE

## 2023-08-06 PROCEDURE — 2580000003 HC RX 258: Performed by: INTERNAL MEDICINE

## 2023-08-06 PROCEDURE — 2580000003 HC RX 258: Performed by: PHYSICIAN ASSISTANT

## 2023-08-06 PROCEDURE — 86923 COMPATIBILITY TEST ELECTRIC: CPT

## 2023-08-06 PROCEDURE — 82248 BILIRUBIN DIRECT: CPT

## 2023-08-06 PROCEDURE — 6370000000 HC RX 637 (ALT 250 FOR IP): Performed by: PHYSICIAN ASSISTANT

## 2023-08-06 PROCEDURE — 86850 RBC ANTIBODY SCREEN: CPT

## 2023-08-06 PROCEDURE — 2140000000 HC CCU INTERMEDIATE R&B

## 2023-08-06 PROCEDURE — 85025 COMPLETE CBC W/AUTO DIFF WBC: CPT

## 2023-08-06 PROCEDURE — 85730 THROMBOPLASTIN TIME PARTIAL: CPT

## 2023-08-06 PROCEDURE — 6360000002 HC RX W HCPCS: Performed by: PHYSICIAN ASSISTANT

## 2023-08-06 PROCEDURE — 86900 BLOOD TYPING SEROLOGIC ABO: CPT

## 2023-08-06 PROCEDURE — 6360000002 HC RX W HCPCS: Performed by: INTERNAL MEDICINE

## 2023-08-06 PROCEDURE — 85610 PROTHROMBIN TIME: CPT

## 2023-08-06 PROCEDURE — 36415 COLL VENOUS BLD VENIPUNCTURE: CPT

## 2023-08-06 PROCEDURE — 86901 BLOOD TYPING SEROLOGIC RH(D): CPT

## 2023-08-06 PROCEDURE — 80053 COMPREHEN METABOLIC PANEL: CPT

## 2023-08-06 RX ORDER — METOPROLOL SUCCINATE 25 MG/1
12.5 TABLET, EXTENDED RELEASE ORAL DAILY
Status: DISCONTINUED | OUTPATIENT
Start: 2023-08-07 | End: 2023-08-07

## 2023-08-06 RX ORDER — SODIUM CHLORIDE 0.9 % (FLUSH) 0.9 %
5-40 SYRINGE (ML) INJECTION EVERY 12 HOURS SCHEDULED
Status: DISCONTINUED | OUTPATIENT
Start: 2023-08-06 | End: 2023-08-07

## 2023-08-06 RX ORDER — SODIUM CHLORIDE 9 MG/ML
INJECTION, SOLUTION INTRAVENOUS PRN
Status: DISCONTINUED | OUTPATIENT
Start: 2023-08-06 | End: 2023-08-07

## 2023-08-06 RX ORDER — SODIUM CHLORIDE 0.9 % (FLUSH) 0.9 %
5-40 SYRINGE (ML) INJECTION PRN
Status: DISCONTINUED | OUTPATIENT
Start: 2023-08-06 | End: 2023-08-07

## 2023-08-06 RX ORDER — CHLORHEXIDINE GLUCONATE 0.12 MG/ML
15 RINSE ORAL ONCE
Status: COMPLETED | OUTPATIENT
Start: 2023-08-07 | End: 2023-08-07

## 2023-08-06 RX ORDER — CHLORHEXIDINE GLUCONATE 4 G/100ML
SOLUTION TOPICAL SEE ADMIN INSTRUCTIONS
Status: DISCONTINUED | OUTPATIENT
Start: 2023-08-06 | End: 2023-08-07

## 2023-08-06 RX ADMIN — GEMFIBROZIL 600 MG: 600 TABLET ORAL at 06:54

## 2023-08-06 RX ADMIN — Medication 500 MG: at 07:44

## 2023-08-06 RX ADMIN — MULTIPLE VITAMINS W/ MINERALS TAB 1 TABLET: TAB at 07:45

## 2023-08-06 RX ADMIN — METOPROLOL SUCCINATE 12.5 MG: 25 TABLET, EXTENDED RELEASE ORAL at 07:45

## 2023-08-06 RX ADMIN — FOLIC ACID 1 MG: 1 TABLET ORAL at 07:45

## 2023-08-06 RX ADMIN — LORAZEPAM 1 MG: 2 INJECTION INTRAMUSCULAR; INTRAVENOUS at 21:42

## 2023-08-06 RX ADMIN — ACETAMINOPHEN 650 MG: 325 TABLET ORAL at 21:44

## 2023-08-06 RX ADMIN — ATORVASTATIN CALCIUM 40 MG: 40 TABLET, FILM COATED ORAL at 20:19

## 2023-08-06 RX ADMIN — Medication 4000 UNITS: at 07:46

## 2023-08-06 RX ADMIN — PSYLLIUM HUSK 1 PACKET: 3.4 POWDER ORAL at 07:45

## 2023-08-06 RX ADMIN — GEMFIBROZIL 600 MG: 600 TABLET ORAL at 15:35

## 2023-08-06 RX ADMIN — SODIUM CHLORIDE, PRESERVATIVE FREE 10 ML: 5 INJECTION INTRAVENOUS at 20:19

## 2023-08-06 RX ADMIN — Medication 100 MG: at 07:45

## 2023-08-06 RX ADMIN — ALLOPURINOL 100 MG: 100 TABLET ORAL at 07:45

## 2023-08-06 RX ADMIN — MUPIROCIN: 20 OINTMENT TOPICAL at 21:32

## 2023-08-06 RX ADMIN — OMEGA-3-ACID ETHYL ESTERS 2 CAPSULE: 1 CAPSULE, LIQUID FILLED ORAL at 20:19

## 2023-08-06 RX ADMIN — PANTOPRAZOLE SODIUM 40 MG: 40 TABLET, DELAYED RELEASE ORAL at 06:54

## 2023-08-06 RX ADMIN — CHLORHEXIDINE GLUCONATE: 213 SOLUTION TOPICAL at 21:00

## 2023-08-06 RX ADMIN — ISOSORBIDE MONONITRATE 30 MG: 30 TABLET, EXTENDED RELEASE ORAL at 07:45

## 2023-08-06 RX ADMIN — ASPIRIN 81 MG: 81 TABLET, COATED ORAL at 07:45

## 2023-08-06 RX ADMIN — HEPARIN SODIUM 14 UNITS/KG/HR: 10000 INJECTION, SOLUTION INTRAVENOUS at 20:18

## 2023-08-06 RX ADMIN — SODIUM CHLORIDE, PRESERVATIVE FREE 10 ML: 5 INJECTION INTRAVENOUS at 07:46

## 2023-08-06 RX ADMIN — OMEGA-3-ACID ETHYL ESTERS 2 CAPSULE: 1 CAPSULE, LIQUID FILLED ORAL at 07:44

## 2023-08-06 NOTE — PLAN OF CARE
Problem: Discharge Planning  Goal: Discharge to home or other facility with appropriate resources  Outcome: Progressing  Flowsheets (Taken 8/6/2023 2280)  Discharge to home or other facility with appropriate resources: Identify barriers to discharge with patient and caregiver     Problem: Pain  Goal: Verbalizes/displays adequate comfort level or baseline comfort level  Outcome: Progressing

## 2023-08-07 ENCOUNTER — APPOINTMENT (OUTPATIENT)
Dept: GENERAL RADIOLOGY | Age: 68
DRG: 234 | End: 2023-08-07
Payer: MEDICARE

## 2023-08-07 ENCOUNTER — ANESTHESIA (OUTPATIENT)
Dept: OPERATING ROOM | Age: 68
DRG: 234 | End: 2023-08-07
Payer: MEDICARE

## 2023-08-07 PROBLEM — G89.18 ACUTE POST-OPERATIVE PAIN: Status: ACTIVE | Noted: 2023-08-07

## 2023-08-07 PROBLEM — J95.89 ACUTE POSTOPERATIVE RESPIRATORY INSUFFICIENCY: Status: ACTIVE | Noted: 2023-08-07

## 2023-08-07 PROBLEM — I95.81 POSTOPERATIVE HYPOTENSION: Status: ACTIVE | Noted: 2023-08-07

## 2023-08-07 LAB
AADO2: 126.7 MMHG
AADO2: 144 MMHG
AADO2: 169.9 MMHG
AADO2: 95.7 MMHG
ABO/RH: NORMAL
ACTIVATED CLOTTING TIME, HIGH RANGE: 105 SEC
ACTIVATED CLOTTING TIME, HIGH RANGE: 435 SEC
ACTIVATED CLOTTING TIME, HIGH RANGE: 481 SEC
ACTIVATED CLOTTING TIME, HIGH RANGE: 560 SEC
ACTIVATED CLOTTING TIME, HIGH RANGE: 96 SEC
ANION GAP SERPL CALCULATED.3IONS-SCNC: 11 MMOL/L (ref 7–16)
ANTIBODY SCREEN: NEGATIVE
ARM BAND NUMBER: NORMAL
B.E.: -0.3 MMOL/L (ref -3–3)
B.E.: -3.1 MMOL/L (ref -3–3)
B.E.: -4.9 MMOL/L (ref -3–3)
B.E.: -5.1 MMOL/L (ref -3–3)
B.E.: -5.4 MMOL/L (ref -3–3)
B.E.: -5.4 MMOL/L (ref -3–3)
BLOOD BANK DISPENSE STATUS: NORMAL
BLOOD BANK SAMPLE EXPIRATION: NORMAL
BPU ID: NORMAL
BUN BLD-MCNC: 15 MG/DL (ref 6–23)
BUN BLD-MCNC: 16 MG/DL (ref 6–23)
BUN BLD-MCNC: 16 MG/DL (ref 6–23)
BUN SERPL-MCNC: 15 MG/DL (ref 6–23)
CA-I BLD-SCNC: 0.95 MMOL/L (ref 1.15–1.33)
CA-I BLD-SCNC: 1.12 MMOL/L (ref 1.15–1.33)
CA-I BLD-SCNC: 1.14 MMOL/L (ref 1.15–1.33)
CA-I BLD-SCNC: 1.54 MMOL/L (ref 1.15–1.33)
CALCIUM SERPL-MCNC: 8.9 MG/DL (ref 8.6–10.2)
CHLORIDE BLD-SCNC: 101 MMOL/L (ref 100–108)
CHLORIDE BLD-SCNC: 102 MMOL/L (ref 100–108)
CHLORIDE BLD-SCNC: 102 MMOL/L (ref 100–108)
CHLORIDE SERPL-SCNC: 104 MMOL/L (ref 98–107)
CO2 BLD CALC-SCNC: 23 MMOL/L (ref 22–29)
CO2 BLD CALC-SCNC: 23 MMOL/L (ref 22–29)
CO2 BLD CALC-SCNC: 24 MMOL/L (ref 22–29)
CO2 SERPL-SCNC: 21 MMOL/L (ref 22–29)
COHB: 0.2 % (ref 0–1.5)
COHB: 0.3 % (ref 0–1.5)
COHB: 0.4 % (ref 0–1.5)
COHB: 0.6 % (ref 0–1.5)
COMPONENT: NORMAL
CREAT BLD-MCNC: 0.7 MG/DL (ref 0.7–1.2)
CREAT BLD-MCNC: 0.7 MG/DL (ref 0.7–1.2)
CREAT BLD-MCNC: 0.9 MG/DL (ref 0.7–1.2)
CREAT SERPL-MCNC: 0.8 MG/DL (ref 0.7–1.2)
CRITICAL NOTIFICATION DATE/TIME: NORMAL
CRITICAL: ABNORMAL
CROSSMATCH RESULT: NORMAL
DATE ANALYZED: ABNORMAL
DATE OF COLLECTION: ABNORMAL
EGFR, POC: >60 ML/MIN/1.73M2
ERYTHROCYTE [DISTWIDTH] IN BLOOD BY AUTOMATED COUNT: 13.1 % (ref 11.5–15)
FIO2: 40 %
FIO2: 40 %
FIO2: 50 %
FIO2: 60 %
GFR SERPL CREATININE-BSD FRML MDRD: >60 ML/MIN/1.73M2
GLUCOSE BLD-MCNC: 126 MG/DL (ref 74–99)
GLUCOSE BLD-MCNC: 88 MG/DL (ref 74–99)
GLUCOSE BLD-MCNC: 95 MG/DL (ref 74–99)
GLUCOSE SERPL-MCNC: 152 MG/DL (ref 74–99)
HCO3 VENOUS: 23.4 MMOL/L (ref 22–26)
HCO3: 19 MMOL/L (ref 22–26)
HCO3: 20.1 MMOL/L (ref 22–26)
HCO3: 20.8 MMOL/L (ref 22–26)
HCO3: 21.2 MMOL/L (ref 22–26)
HCO3: 21.5 MMOL/L (ref 22–26)
HCO3: 23.1 MMOL/L (ref 22–26)
HCT VFR BLD AUTO: 28 % (ref 37–54)
HCT VFR BLD AUTO: 30 % (ref 37–54)
HCT VFR BLD AUTO: 30 % (ref 37–54)
HCT VFR BLD AUTO: 37.7 % (ref 37–54)
HGB BLD-MCNC: 12.8 G/DL (ref 12.5–16.5)
HHB: 1.5 % (ref 0–5)
HHB: 1.6 % (ref 0–5)
HHB: 1.9 % (ref 0–5)
HHB: 1.9 % (ref 0–5)
HHB: 2.7 % (ref 0–5)
HHB: 3 % (ref 0–5)
INR PPP: 1.3
LAB: ABNORMAL
MAGNESIUM SERPL-MCNC: 3 MG/DL (ref 1.6–2.6)
MCH RBC QN AUTO: 30.4 PG (ref 26–35)
MCHC RBC AUTO-ENTMCNC: 34 G/DL (ref 32–34.5)
MCV RBC AUTO: 89.5 FL (ref 80–99.9)
METER GLUCOSE: 117 MG/DL (ref 74–99)
METER GLUCOSE: 148 MG/DL (ref 74–99)
METER GLUCOSE: 167 MG/DL (ref 74–99)
METER GLUCOSE: 171 MG/DL (ref 74–99)
METER GLUCOSE: 175 MG/DL (ref 74–99)
METHB: 0.2 % (ref 0–1.5)
METHB: 0.3 % (ref 0–1.5)
METHB: 0.5 % (ref 0–1.5)
MODE: ABNORMAL
MODE: AC
NEGATIVE BASE EXCESS, ART: 0.5 MMOL/L
NEGATIVE BASE EXCESS, VEN: 2.2 MMOL/L
O2 CONTENT: 17 ML/DL
O2 CONTENT: 17.7 ML/DL
O2 CONTENT: 18.6 ML/DL
O2 CONTENT: 18.7 ML/DL
O2 CONTENT: 18.8 ML/DL
O2 CONTENT: 19.1 ML/DL
O2 SAT, VEN: 78.2 %
O2 SATURATION: 97 % (ref 92–98.5)
O2 SATURATION: 97.3 % (ref 92–98.5)
O2 SATURATION: 98.1 % (ref 92–98.5)
O2 SATURATION: 98.1 % (ref 92–98.5)
O2 SATURATION: 98.4 % (ref 92–98.5)
O2 SATURATION: 98.5 % (ref 92–98.5)
O2HB: 96.2 % (ref 94–97)
O2HB: 96.6 % (ref 94–97)
O2HB: 97.3 % (ref 94–97)
O2HB: 97.5 % (ref 94–97)
O2HB: 97.9 % (ref 94–97)
O2HB: 97.9 % (ref 94–97)
OPERATOR ID: 421
OPERATOR ID: 421
OPERATOR ID: 5100
OPERATOR ID: ABNORMAL
PARTIAL THROMBOPLASTIN TIME: 26 SEC (ref 24.5–35.1)
PATIENT TEMP: 37 C
PCO2, VEN: 42.9 MM HG
PCO2: 33.9 MMHG (ref 35–45)
PCO2: 33.9 MMHG (ref 35–45)
PCO2: 35.6 MMHG (ref 35–45)
PCO2: 38 MMHG (ref 35–45)
PCO2: 41 MMHG (ref 35–45)
PCO2: 47 MMHG (ref 35–45)
PEEP/CPAP: 5 CMH2O
PEEP/CPAP: 8 CMH2O
PEEP/CPAP: 8 CMH2O
PFO2: 2.62 MMHG/%
PFO2: 3.24 MMHG/%
PFO2: 3.3 MMHG/%
PFO2: 3.51 MMHG/%
PH BLOOD GAS: 7.28 (ref 7.35–7.45)
PH BLOOD GAS: 7.32 (ref 7.35–7.45)
PH BLOOD GAS: 7.34 (ref 7.35–7.45)
PH BLOOD GAS: 7.37 (ref 7.35–7.45)
PH BLOOD GAS: 7.39 (ref 7.35–7.45)
PH BLOOD GAS: 7.45 (ref 7.35–7.45)
PH VENOUS: 7.34 (ref 7.35–7.45)
PLATELET # BLD AUTO: 190 K/UL (ref 130–450)
PMV BLD AUTO: 10.4 FL (ref 7–12)
PO2, VEN: 45.1 MM HG
PO2: 104.8 MMHG (ref 75–100)
PO2: 118.5 MMHG (ref 75–100)
PO2: 125.8 MMHG (ref 75–100)
PO2: 140.5 MMHG (ref 75–100)
PO2: 161.9 MMHG (ref 75–100)
PO2: 197.8 MMHG (ref 75–100)
POC ANION GAP: 8 MMOL/L (ref 7–16)
POC ANION GAP: 8 MMOL/L (ref 7–16)
POC ANION GAP: 9 MMOL/L (ref 7–16)
POC HCO3: 23.5 MMOL/L (ref 22–26)
POC HCO3: 24.7 MMOL/L (ref 22–26)
POC HEMOGLOBIN (CALC): 10 G/DL (ref 12.5–15.5)
POC HEMOGLOBIN (CALC): 10.2 G/DL (ref 12.5–15.5)
POC HEMOGLOBIN (CALC): 9.6 G/DL (ref 12.5–15.5)
POC LACTIC ACID: 1 MMOL/L (ref 0.5–2.2)
POC LACTIC ACID: 1.1 MMOL/L (ref 0.5–2.2)
POC LACTIC ACID: 2.2 MMOL/L (ref 0.5–2.2)
POC O2 SATURATION: 100 % (ref 92–98.5)
POC O2 SATURATION: 99.9 % (ref 92–98.5)
POC PCO2: 35 MM HG (ref 35–45)
POC PCO2: 36.6 MM HG (ref 35–45)
POC PH: 7.43 (ref 7.35–7.45)
POC PH: 7.44 (ref 7.35–7.45)
POC PO2: 314.5 MM HG (ref 60–80)
POC PO2: 463.6 MM HG (ref 60–80)
POSITIVE BASE EXCESS, ART: 0.6 MMOL/L (ref 0–3)
POTASSIUM BLD-SCNC: 4.6 MMOL/L (ref 3.5–5)
POTASSIUM BLD-SCNC: 4.7 MMOL/L (ref 3.5–5)
POTASSIUM BLD-SCNC: 5.1 MMOL/L (ref 3.5–5)
POTASSIUM SERPL-SCNC: 3.87 MMOL/L (ref 3.5–5)
POTASSIUM SERPL-SCNC: 4.1 MMOL/L (ref 3.5–5)
POTASSIUM SERPL-SCNC: 4.11 MMOL/L (ref 3.5–5)
POTASSIUM SERPL-SCNC: 4.17 MMOL/L (ref 3.5–5)
POTASSIUM SERPL-SCNC: 4.25 MMOL/L (ref 3.5–5)
POTASSIUM SERPL-SCNC: 4.3 MMOL/L (ref 3.5–5)
PROTHROMBIN TIME: 14 SEC (ref 9.3–12.4)
PS: 12 CMH20
PS: 8 CMH20
RBC # BLD AUTO: 4.21 M/UL (ref 3.8–5.8)
RI(T): 0.68
RI(T): 0.86
RI(T): 0.89
RI(T): 1.21
RR MECHANICAL: 12 B/MIN
SODIUM BLD-SCNC: 133 MMOL/L (ref 132–146)
SODIUM BLD-SCNC: 133 MMOL/L (ref 132–146)
SODIUM BLD-SCNC: 134 MMOL/L (ref 132–146)
SODIUM SERPL-SCNC: 136 MMOL/L (ref 132–146)
SOURCE, BLOOD GAS: ABNORMAL
THB: 12.3 G/DL (ref 11.5–16.5)
THB: 12.6 G/DL (ref 11.5–16.5)
THB: 13.5 G/DL (ref 11.5–16.5)
THB: 13.6 G/DL (ref 11.5–16.5)
THB: 13.6 G/DL (ref 11.5–16.5)
THB: 13.8 G/DL (ref 11.5–16.5)
TIME ANALYZED: 1302
TIME ANALYZED: 1343
TIME ANALYZED: 1452
TIME ANALYZED: 1637
TIME ANALYZED: 1756
TIME ANALYZED: 2019
TRANSFUSION STATUS: NORMAL
UNIT DIVISION: 0
VT MECHANICAL: 450 ML
WBC OTHER # BLD: 18.6 K/UL (ref 4.5–11.5)

## 2023-08-07 PROCEDURE — 3700000000 HC ANESTHESIA ATTENDED CARE: Performed by: THORACIC SURGERY (CARDIOTHORACIC VASCULAR SURGERY)

## 2023-08-07 PROCEDURE — 2500000003 HC RX 250 WO HCPCS

## 2023-08-07 PROCEDURE — 83605 ASSAY OF LACTIC ACID: CPT

## 2023-08-07 PROCEDURE — A4648 IMPLANTABLE TISSUE MARKER: HCPCS | Performed by: THORACIC SURGERY (CARDIOTHORACIC VASCULAR SURGERY)

## 2023-08-07 PROCEDURE — P9045 ALBUMIN (HUMAN), 5%, 250 ML: HCPCS

## 2023-08-07 PROCEDURE — 82803 BLOOD GASES ANY COMBINATION: CPT

## 2023-08-07 PROCEDURE — 7100000000 HC PACU RECOVERY - FIRST 15 MIN

## 2023-08-07 PROCEDURE — 94664 DEMO&/EVAL PT USE INHALER: CPT

## 2023-08-07 PROCEDURE — 94640 AIRWAY INHALATION TREATMENT: CPT

## 2023-08-07 PROCEDURE — 02100Z9 BYPASS CORONARY ARTERY, ONE ARTERY FROM LEFT INTERNAL MAMMARY, OPEN APPROACH: ICD-10-PCS | Performed by: THORACIC SURGERY (CARDIOTHORACIC VASCULAR SURGERY)

## 2023-08-07 PROCEDURE — 02L70CK OCCLUSION OF LEFT ATRIAL APPENDAGE WITH EXTRALUMINAL DEVICE, OPEN APPROACH: ICD-10-PCS | Performed by: THORACIC SURGERY (CARDIOTHORACIC VASCULAR SURGERY)

## 2023-08-07 PROCEDURE — 2580000003 HC RX 258: Performed by: NURSE PRACTITIONER

## 2023-08-07 PROCEDURE — 2580000003 HC RX 258

## 2023-08-07 PROCEDURE — 85347 COAGULATION TIME ACTIVATED: CPT

## 2023-08-07 PROCEDURE — 83735 ASSAY OF MAGNESIUM: CPT

## 2023-08-07 PROCEDURE — 06BP4ZZ EXCISION OF RIGHT SAPHENOUS VEIN, PERCUTANEOUS ENDOSCOPIC APPROACH: ICD-10-PCS | Performed by: THORACIC SURGERY (CARDIOTHORACIC VASCULAR SURGERY)

## 2023-08-07 PROCEDURE — 2000000000 HC ICU R&B

## 2023-08-07 PROCEDURE — 33518 CABG ARTERY-VEIN TWO: CPT | Performed by: THORACIC SURGERY (CARDIOTHORACIC VASCULAR SURGERY)

## 2023-08-07 PROCEDURE — 021009W BYPASS CORONARY ARTERY, ONE ARTERY FROM AORTA WITH AUTOLOGOUS VENOUS TISSUE, OPEN APPROACH: ICD-10-PCS | Performed by: THORACIC SURGERY (CARDIOTHORACIC VASCULAR SURGERY)

## 2023-08-07 PROCEDURE — 84132 ASSAY OF SERUM POTASSIUM: CPT

## 2023-08-07 PROCEDURE — 0PH000Z INSERTION OF RIGID PLATE INTERNAL FIXATION DEVICE INTO STERNUM, OPEN APPROACH: ICD-10-PCS | Performed by: THORACIC SURGERY (CARDIOTHORACIC VASCULAR SURGERY)

## 2023-08-07 PROCEDURE — 33533 CABG ARTERIAL SINGLE: CPT | Performed by: THORACIC SURGERY (CARDIOTHORACIC VASCULAR SURGERY)

## 2023-08-07 PROCEDURE — C1729 CATH, DRAINAGE: HCPCS | Performed by: THORACIC SURGERY (CARDIOTHORACIC VASCULAR SURGERY)

## 2023-08-07 PROCEDURE — 6370000000 HC RX 637 (ALT 250 FOR IP)

## 2023-08-07 PROCEDURE — 80047 BASIC METABLC PNL IONIZED CA: CPT

## 2023-08-07 PROCEDURE — 37799 UNLISTED PX VASCULAR SURGERY: CPT

## 2023-08-07 PROCEDURE — 85027 COMPLETE CBC AUTOMATED: CPT

## 2023-08-07 PROCEDURE — 3600000008 HC SURGERY OHS BASE: Performed by: THORACIC SURGERY (CARDIOTHORACIC VASCULAR SURGERY)

## 2023-08-07 PROCEDURE — 85014 HEMATOCRIT: CPT

## 2023-08-07 PROCEDURE — C1889 IMPLANT/INSERT DEVICE, NOC: HCPCS | Performed by: THORACIC SURGERY (CARDIOTHORACIC VASCULAR SURGERY)

## 2023-08-07 PROCEDURE — 6360000002 HC RX W HCPCS

## 2023-08-07 PROCEDURE — 07B90ZZ EXCISION OF LEFT INTERNAL MAMMARY LYMPHATIC, OPEN APPROACH: ICD-10-PCS | Performed by: THORACIC SURGERY (CARDIOTHORACIC VASCULAR SURGERY)

## 2023-08-07 PROCEDURE — 85610 PROTHROMBIN TIME: CPT

## 2023-08-07 PROCEDURE — C1713 ANCHOR/SCREW BN/BN,TIS/BN: HCPCS | Performed by: THORACIC SURGERY (CARDIOTHORACIC VASCULAR SURGERY)

## 2023-08-07 PROCEDURE — 82947 ASSAY GLUCOSE BLOOD QUANT: CPT

## 2023-08-07 PROCEDURE — 7100000001 HC PACU RECOVERY - ADDTL 15 MIN

## 2023-08-07 PROCEDURE — 82805 BLOOD GASES W/O2 SATURATION: CPT

## 2023-08-07 PROCEDURE — P9045 ALBUMIN (HUMAN), 5%, 250 ML: HCPCS | Performed by: NURSE PRACTITIONER

## 2023-08-07 PROCEDURE — 94150 VITAL CAPACITY TEST: CPT

## 2023-08-07 PROCEDURE — 6360000002 HC RX W HCPCS: Performed by: NURSE PRACTITIONER

## 2023-08-07 PROCEDURE — 82330 ASSAY OF CALCIUM: CPT

## 2023-08-07 PROCEDURE — 3700000001 HC ADD 15 MINUTES (ANESTHESIA): Performed by: THORACIC SURGERY (CARDIOTHORACIC VASCULAR SURGERY)

## 2023-08-07 PROCEDURE — 2700000000 HC OXYGEN THERAPY PER DAY

## 2023-08-07 PROCEDURE — 71045 X-RAY EXAM CHEST 1 VIEW: CPT

## 2023-08-07 PROCEDURE — 80048 BASIC METABOLIC PNL TOTAL CA: CPT

## 2023-08-07 PROCEDURE — 02583ZZ DESTRUCTION OF CONDUCTION MECHANISM, PERCUTANEOUS APPROACH: ICD-10-PCS | Performed by: THORACIC SURGERY (CARDIOTHORACIC VASCULAR SURGERY)

## 2023-08-07 PROCEDURE — 3600000018 HC SURGERY OHS ADDTL 15MIN: Performed by: THORACIC SURGERY (CARDIOTHORACIC VASCULAR SURGERY)

## 2023-08-07 PROCEDURE — 94002 VENT MGMT INPAT INIT DAY: CPT

## 2023-08-07 PROCEDURE — 33259 ABLATE ATRIA W/BYPASS ADD-ON: CPT | Performed by: THORACIC SURGERY (CARDIOTHORACIC VASCULAR SURGERY)

## 2023-08-07 PROCEDURE — 85730 THROMBOPLASTIN TIME PARTIAL: CPT

## 2023-08-07 PROCEDURE — 94667 MNPJ CHEST WALL 1ST: CPT

## 2023-08-07 PROCEDURE — A4217 STERILE WATER/SALINE, 500 ML: HCPCS | Performed by: THORACIC SURGERY (CARDIOTHORACIC VASCULAR SURGERY)

## 2023-08-07 PROCEDURE — 32551 INSERTION OF CHEST TUBE: CPT

## 2023-08-07 PROCEDURE — C9113 INJ PANTOPRAZOLE SODIUM, VIA: HCPCS

## 2023-08-07 PROCEDURE — 2580000003 HC RX 258: Performed by: THORACIC SURGERY (CARDIOTHORACIC VASCULAR SURGERY)

## 2023-08-07 PROCEDURE — 38530 BIOPSY/REMOVAL LYMPH NODES: CPT | Performed by: THORACIC SURGERY (CARDIOTHORACIC VASCULAR SURGERY)

## 2023-08-07 PROCEDURE — 6360000002 HC RX W HCPCS: Performed by: THORACIC SURGERY (CARDIOTHORACIC VASCULAR SURGERY)

## 2023-08-07 PROCEDURE — 33508 ENDOSCOPIC VEIN HARVEST: CPT | Performed by: THORACIC SURGERY (CARDIOTHORACIC VASCULAR SURGERY)

## 2023-08-07 PROCEDURE — 36620 INSERTION CATHETER ARTERY: CPT

## 2023-08-07 PROCEDURE — 99233 SBSQ HOSP IP/OBS HIGH 50: CPT | Performed by: INTERNAL MEDICINE

## 2023-08-07 PROCEDURE — 88307 TISSUE EXAM BY PATHOLOGIST: CPT

## 2023-08-07 PROCEDURE — 36556 INSERT NON-TUNNEL CV CATH: CPT

## 2023-08-07 PROCEDURE — A4216 STERILE WATER/SALINE, 10 ML: HCPCS

## 2023-08-07 PROCEDURE — 6370000000 HC RX 637 (ALT 250 FOR IP): Performed by: PHYSICIAN ASSISTANT

## 2023-08-07 PROCEDURE — 94660 CPAP INITIATION&MGMT: CPT

## 2023-08-07 PROCEDURE — 2720000010 HC SURG SUPPLY STERILE: Performed by: THORACIC SURGERY (CARDIOTHORACIC VASCULAR SURGERY)

## 2023-08-07 PROCEDURE — 2709999900 HC NON-CHARGEABLE SUPPLY: Performed by: THORACIC SURGERY (CARDIOTHORACIC VASCULAR SURGERY)

## 2023-08-07 DEVICE — STERNALPLATE, BOX: Type: IMPLANTABLE DEVICE | Site: STERNUM | Status: FUNCTIONAL

## 2023-08-07 DEVICE — LOCKING SCREW,AXS,SELF-DRILLING
Type: IMPLANTABLE DEVICE | Site: STERNUM | Status: FUNCTIONAL
Brand: AXS, SMARTLOCK

## 2023-08-07 DEVICE — DEVICE OCCL CLP L35MM PLUNG GRP FLX SHFT FOR GILLINOV: Type: IMPLANTABLE DEVICE | Site: HEART | Status: FUNCTIONAL

## 2023-08-07 DEVICE — STERNALPLATE, LADDER, NARROW: Type: IMPLANTABLE DEVICE | Site: STERNUM | Status: FUNCTIONAL

## 2023-08-07 RX ORDER — PROPOFOL 10 MG/ML
10 INJECTION, EMULSION INTRAVENOUS CONTINUOUS PRN
Status: DISCONTINUED | OUTPATIENT
Start: 2023-08-07 | End: 2023-08-07 | Stop reason: CLARIF

## 2023-08-07 RX ORDER — SODIUM CHLORIDE, SODIUM LACTATE, POTASSIUM CHLORIDE, CALCIUM CHLORIDE 600; 310; 30; 20 MG/100ML; MG/100ML; MG/100ML; MG/100ML
INJECTION, SOLUTION INTRAVENOUS CONTINUOUS PRN
Status: DISCONTINUED | OUTPATIENT
Start: 2023-08-07 | End: 2023-08-07 | Stop reason: SDUPTHER

## 2023-08-07 RX ORDER — PROPOFOL 10 MG/ML
INJECTION, EMULSION INTRAVENOUS PRN
Status: DISCONTINUED | OUTPATIENT
Start: 2023-08-07 | End: 2023-08-07 | Stop reason: SDUPTHER

## 2023-08-07 RX ORDER — PROPOFOL 10 MG/ML
INJECTION, EMULSION INTRAVENOUS
Status: COMPLETED
Start: 2023-08-07 | End: 2023-08-07

## 2023-08-07 RX ORDER — BISACODYL 10 MG
10 SUPPOSITORY, RECTAL RECTAL DAILY PRN
Status: DISCONTINUED | OUTPATIENT
Start: 2023-08-08 | End: 2023-08-08

## 2023-08-07 RX ORDER — SODIUM CHLORIDE 9 MG/ML
INJECTION, SOLUTION INTRAVENOUS CONTINUOUS
Status: DISCONTINUED | OUTPATIENT
Start: 2023-08-07 | End: 2023-08-08

## 2023-08-07 RX ORDER — ACETAMINOPHEN 325 MG/1
650 TABLET ORAL EVERY 4 HOURS PRN
Status: DISCONTINUED | OUTPATIENT
Start: 2023-08-10 | End: 2023-08-08

## 2023-08-07 RX ORDER — IBUPROFEN 400 MG/1
400 TABLET ORAL EVERY 8 HOURS
Status: COMPLETED | OUTPATIENT
Start: 2023-08-09 | End: 2023-08-10

## 2023-08-07 RX ORDER — ACETAMINOPHEN 500 MG
1000 TABLET ORAL EVERY 6 HOURS
Status: DISCONTINUED | OUTPATIENT
Start: 2023-08-07 | End: 2023-08-08 | Stop reason: SDUPTHER

## 2023-08-07 RX ORDER — ALBUMIN, HUMAN INJ 5% 5 %
25 SOLUTION INTRAVENOUS PRN
Status: DISCONTINUED | OUTPATIENT
Start: 2023-08-07 | End: 2023-08-08

## 2023-08-07 RX ORDER — NOREPINEPHRINE BITARTRATE 1 MG/ML
INJECTION, SOLUTION INTRAVENOUS PRN
Status: DISCONTINUED | OUTPATIENT
Start: 2023-08-07 | End: 2023-08-07 | Stop reason: SDUPTHER

## 2023-08-07 RX ORDER — MIDAZOLAM HYDROCHLORIDE 1 MG/ML
INJECTION INTRAMUSCULAR; INTRAVENOUS PRN
Status: DISCONTINUED | OUTPATIENT
Start: 2023-08-07 | End: 2023-08-07 | Stop reason: SDUPTHER

## 2023-08-07 RX ORDER — ACETAMINOPHEN 650 MG/1
650 SUPPOSITORY RECTAL EVERY 4 HOURS PRN
Status: DISCONTINUED | OUTPATIENT
Start: 2023-08-07 | End: 2023-08-08

## 2023-08-07 RX ORDER — ONDANSETRON 4 MG/1
4 TABLET, ORALLY DISINTEGRATING ORAL EVERY 8 HOURS PRN
Status: DISCONTINUED | OUTPATIENT
Start: 2023-08-07 | End: 2023-08-10 | Stop reason: HOSPADM

## 2023-08-07 RX ORDER — OXYCODONE HYDROCHLORIDE 10 MG/1
10 TABLET ORAL EVERY 4 HOURS PRN
Status: DISCONTINUED | OUTPATIENT
Start: 2023-08-07 | End: 2023-08-10 | Stop reason: HOSPADM

## 2023-08-07 RX ORDER — AMIODARONE HYDROCHLORIDE 200 MG/1
400 TABLET ORAL PRN
Status: DISCONTINUED | OUTPATIENT
Start: 2023-08-07 | End: 2023-08-08

## 2023-08-07 RX ORDER — LIDOCAINE HYDROCHLORIDE 20 MG/ML
INJECTION, SOLUTION INTRAVENOUS PRN
Status: DISCONTINUED | OUTPATIENT
Start: 2023-08-07 | End: 2023-08-07 | Stop reason: SDUPTHER

## 2023-08-07 RX ORDER — INSULIN LISPRO 100 [IU]/ML
0-16 INJECTION, SOLUTION INTRAVENOUS; SUBCUTANEOUS EVERY 4 HOURS
Status: DISCONTINUED | OUTPATIENT
Start: 2023-08-07 | End: 2023-08-08

## 2023-08-07 RX ORDER — CEFAZOLIN SODIUM 1 G/3ML
INJECTION, POWDER, FOR SOLUTION INTRAMUSCULAR; INTRAVENOUS PRN
Status: DISCONTINUED | OUTPATIENT
Start: 2023-08-07 | End: 2023-08-07 | Stop reason: SDUPTHER

## 2023-08-07 RX ORDER — GLYCOPYRROLATE 0.2 MG/ML
INJECTION INTRAMUSCULAR; INTRAVENOUS PRN
Status: DISCONTINUED | OUTPATIENT
Start: 2023-08-07 | End: 2023-08-07 | Stop reason: SDUPTHER

## 2023-08-07 RX ORDER — SODIUM CHLORIDE 9 MG/ML
INJECTION, SOLUTION INTRAVENOUS PRN
Status: DISCONTINUED | OUTPATIENT
Start: 2023-08-07 | End: 2023-08-08

## 2023-08-07 RX ORDER — INSULIN GLARGINE-YFGN 100 [IU]/ML
0.15 INJECTION, SOLUTION SUBCUTANEOUS NIGHTLY
Status: DISCONTINUED | OUTPATIENT
Start: 2023-08-08 | End: 2023-08-08

## 2023-08-07 RX ORDER — ONDANSETRON 2 MG/ML
4 INJECTION INTRAMUSCULAR; INTRAVENOUS EVERY 6 HOURS PRN
Status: DISCONTINUED | OUTPATIENT
Start: 2023-08-07 | End: 2023-08-10 | Stop reason: HOSPADM

## 2023-08-07 RX ORDER — NOREPINEPHRINE BITARTRATE 0.06 MG/ML
1-100 INJECTION, SOLUTION INTRAVENOUS CONTINUOUS PRN
Status: DISCONTINUED | OUTPATIENT
Start: 2023-08-07 | End: 2023-08-08

## 2023-08-07 RX ORDER — MAGNESIUM SULFATE IN WATER 40 MG/ML
2000 INJECTION, SOLUTION INTRAVENOUS PRN
Status: DISCONTINUED | OUTPATIENT
Start: 2023-08-07 | End: 2023-08-08

## 2023-08-07 RX ORDER — HEPARIN SODIUM 10000 [USP'U]/ML
INJECTION, SOLUTION INTRAVENOUS; SUBCUTANEOUS PRN
Status: DISCONTINUED | OUTPATIENT
Start: 2023-08-07 | End: 2023-08-07 | Stop reason: SDUPTHER

## 2023-08-07 RX ORDER — KETOROLAC TROMETHAMINE 30 MG/ML
15 INJECTION, SOLUTION INTRAMUSCULAR; INTRAVENOUS EVERY 6 HOURS
Status: COMPLETED | OUTPATIENT
Start: 2023-08-07 | End: 2023-08-09

## 2023-08-07 RX ORDER — LANOLIN ALCOHOL/MO/W.PET/CERES
400 CREAM (GRAM) TOPICAL 2 TIMES DAILY
Status: DISCONTINUED | OUTPATIENT
Start: 2023-08-08 | End: 2023-08-10 | Stop reason: HOSPADM

## 2023-08-07 RX ORDER — IBUPROFEN 400 MG/1
400 TABLET ORAL EVERY 6 HOURS PRN
Status: DISCONTINUED | OUTPATIENT
Start: 2023-08-10 | End: 2023-08-10 | Stop reason: HOSPADM

## 2023-08-07 RX ORDER — TAMSULOSIN HYDROCHLORIDE 0.4 MG/1
0.4 CAPSULE ORAL DAILY
Status: DISCONTINUED | OUTPATIENT
Start: 2023-08-08 | End: 2023-08-09

## 2023-08-07 RX ORDER — OXYCODONE HYDROCHLORIDE 5 MG/1
5 TABLET ORAL EVERY 4 HOURS PRN
Status: DISCONTINUED | OUTPATIENT
Start: 2023-08-07 | End: 2023-08-10 | Stop reason: HOSPADM

## 2023-08-07 RX ORDER — IPRATROPIUM BROMIDE AND ALBUTEROL SULFATE 2.5; .5 MG/3ML; MG/3ML
1 SOLUTION RESPIRATORY (INHALATION)
Status: DISCONTINUED | OUTPATIENT
Start: 2023-08-07 | End: 2023-08-10 | Stop reason: HOSPADM

## 2023-08-07 RX ORDER — VECURONIUM BROMIDE 1 MG/ML
INJECTION, POWDER, LYOPHILIZED, FOR SOLUTION INTRAVENOUS PRN
Status: DISCONTINUED | OUTPATIENT
Start: 2023-08-07 | End: 2023-08-07 | Stop reason: SDUPTHER

## 2023-08-07 RX ORDER — PROPOFOL 10 MG/ML
10 INJECTION, EMULSION INTRAVENOUS CONTINUOUS PRN
Status: DISCONTINUED | OUTPATIENT
Start: 2023-08-07 | End: 2023-08-08

## 2023-08-07 RX ORDER — DEXTROSE MONOHYDRATE 100 MG/ML
INJECTION, SOLUTION INTRAVENOUS CONTINUOUS PRN
Status: DISCONTINUED | OUTPATIENT
Start: 2023-08-07 | End: 2023-08-08

## 2023-08-07 RX ORDER — 0.9 % SODIUM CHLORIDE 0.9 %
250 INTRAVENOUS SOLUTION INTRAVENOUS CONTINUOUS PRN
Status: DISCONTINUED | OUTPATIENT
Start: 2023-08-07 | End: 2023-08-10 | Stop reason: HOSPADM

## 2023-08-07 RX ORDER — ASPIRIN 81 MG/1
81 TABLET, CHEWABLE ORAL ONCE
Status: COMPLETED | OUTPATIENT
Start: 2023-08-07 | End: 2023-08-07

## 2023-08-07 RX ORDER — POTASSIUM CHLORIDE 29.8 MG/ML
20 INJECTION INTRAVENOUS PRN
Status: DISCONTINUED | OUTPATIENT
Start: 2023-08-07 | End: 2023-08-08

## 2023-08-07 RX ORDER — NEOSTIGMINE METHYLSULFATE 1 MG/ML
INJECTION, SOLUTION INTRAVENOUS PRN
Status: DISCONTINUED | OUTPATIENT
Start: 2023-08-07 | End: 2023-08-07 | Stop reason: SDUPTHER

## 2023-08-07 RX ORDER — AMINOCAPROIC ACID 250 MG/ML
INJECTION, SOLUTION INTRAVENOUS PRN
Status: DISCONTINUED | OUTPATIENT
Start: 2023-08-07 | End: 2023-08-07 | Stop reason: SDUPTHER

## 2023-08-07 RX ORDER — CALCIUM CHLORIDE 100 MG/ML
INJECTION INTRAVENOUS; INTRAVENTRICULAR PRN
Status: DISCONTINUED | OUTPATIENT
Start: 2023-08-07 | End: 2023-08-07 | Stop reason: SDUPTHER

## 2023-08-07 RX ORDER — FENTANYL CITRATE 0.05 MG/ML
INJECTION, SOLUTION INTRAMUSCULAR; INTRAVENOUS PRN
Status: DISCONTINUED | OUTPATIENT
Start: 2023-08-07 | End: 2023-08-07 | Stop reason: SDUPTHER

## 2023-08-07 RX ORDER — NOREPINEPHRINE BITARTRATE 0.06 MG/ML
INJECTION, SOLUTION INTRAVENOUS
Status: COMPLETED
Start: 2023-08-07 | End: 2023-08-07

## 2023-08-07 RX ORDER — LIDOCAINE 4 G/G
1 PATCH TOPICAL DAILY
Status: DISCONTINUED | OUTPATIENT
Start: 2023-08-07 | End: 2023-08-10 | Stop reason: HOSPADM

## 2023-08-07 RX ORDER — ALBUMIN, HUMAN INJ 5% 5 %
25 SOLUTION INTRAVENOUS ONCE
Status: COMPLETED | OUTPATIENT
Start: 2023-08-07 | End: 2023-08-07

## 2023-08-07 RX ORDER — NITROGLYCERIN 5 MG/ML
INJECTION, SOLUTION INTRAVENOUS PRN
Status: DISCONTINUED | OUTPATIENT
Start: 2023-08-07 | End: 2023-08-07 | Stop reason: SDUPTHER

## 2023-08-07 RX ORDER — MEPERIDINE HYDROCHLORIDE 25 MG/ML
25 INJECTION INTRAMUSCULAR; INTRAVENOUS; SUBCUTANEOUS
Status: DISCONTINUED | OUTPATIENT
Start: 2023-08-07 | End: 2023-08-08

## 2023-08-07 RX ORDER — SODIUM CHLORIDE 9 MG/ML
INJECTION, SOLUTION INTRAVENOUS CONTINUOUS PRN
Status: DISCONTINUED | OUTPATIENT
Start: 2023-08-07 | End: 2023-08-07 | Stop reason: SDUPTHER

## 2023-08-07 RX ORDER — SENNA AND DOCUSATE SODIUM 50; 8.6 MG/1; MG/1
1 TABLET, FILM COATED ORAL 2 TIMES DAILY
Status: DISCONTINUED | OUTPATIENT
Start: 2023-08-07 | End: 2023-08-08

## 2023-08-07 RX ORDER — SODIUM CHLORIDE 0.9 % (FLUSH) 0.9 %
5-40 SYRINGE (ML) INJECTION PRN
Status: DISCONTINUED | OUTPATIENT
Start: 2023-08-07 | End: 2023-08-10 | Stop reason: HOSPADM

## 2023-08-07 RX ORDER — ALBUMIN, HUMAN INJ 5% 5 %
SOLUTION INTRAVENOUS
Status: COMPLETED
Start: 2023-08-07 | End: 2023-08-07

## 2023-08-07 RX ORDER — HYDROMORPHONE HYDROCHLORIDE 1 MG/ML
0.25 INJECTION, SOLUTION INTRAMUSCULAR; INTRAVENOUS; SUBCUTANEOUS
Status: DISCONTINUED | OUTPATIENT
Start: 2023-08-07 | End: 2023-08-08

## 2023-08-07 RX ORDER — CHLORHEXIDINE GLUCONATE 0.12 MG/ML
15 RINSE ORAL 2 TIMES DAILY
Status: DISCONTINUED | OUTPATIENT
Start: 2023-08-07 | End: 2023-08-08

## 2023-08-07 RX ORDER — PROTAMINE SULFATE 10 MG/ML
INJECTION, SOLUTION INTRAVENOUS PRN
Status: DISCONTINUED | OUTPATIENT
Start: 2023-08-07 | End: 2023-08-07 | Stop reason: SDUPTHER

## 2023-08-07 RX ORDER — PANTOPRAZOLE SODIUM 40 MG/1
40 TABLET, DELAYED RELEASE ORAL
Status: DISCONTINUED | OUTPATIENT
Start: 2023-08-08 | End: 2023-08-10 | Stop reason: HOSPADM

## 2023-08-07 RX ORDER — ASPIRIN 81 MG/1
81 TABLET ORAL DAILY
Status: DISCONTINUED | OUTPATIENT
Start: 2023-08-08 | End: 2023-08-08

## 2023-08-07 RX ORDER — HYDROMORPHONE HYDROCHLORIDE 1 MG/ML
0.5 INJECTION, SOLUTION INTRAMUSCULAR; INTRAVENOUS; SUBCUTANEOUS
Status: DISCONTINUED | OUTPATIENT
Start: 2023-08-07 | End: 2023-08-08

## 2023-08-07 RX ORDER — SODIUM CHLORIDE 0.9 % (FLUSH) 0.9 %
5-40 SYRINGE (ML) INJECTION EVERY 12 HOURS SCHEDULED
Status: DISCONTINUED | OUTPATIENT
Start: 2023-08-07 | End: 2023-08-10 | Stop reason: HOSPADM

## 2023-08-07 RX ADMIN — KETOROLAC TROMETHAMINE 15 MG: 30 INJECTION, SOLUTION INTRAMUSCULAR; INTRAVENOUS at 20:04

## 2023-08-07 RX ADMIN — INSULIN LISPRO 4 UNITS: 100 INJECTION, SOLUTION INTRAVENOUS; SUBCUTANEOUS at 20:19

## 2023-08-07 RX ADMIN — NITROGLYCERIN 50 MCG: 5 INJECTION, SOLUTION INTRAVENOUS at 09:53

## 2023-08-07 RX ADMIN — HYDROMORPHONE HYDROCHLORIDE 0.25 MG: 1 INJECTION, SOLUTION INTRAMUSCULAR; INTRAVENOUS; SUBCUTANEOUS at 15:20

## 2023-08-07 RX ADMIN — FENTANYL CITRATE 250 MCG: 50 INJECTION, SOLUTION INTRAMUSCULAR; INTRAVENOUS at 08:39

## 2023-08-07 RX ADMIN — PROPOFOL 30 MG: 10 INJECTION, EMULSION INTRAVENOUS at 11:37

## 2023-08-07 RX ADMIN — PROPOFOL 1000 MG: 10 INJECTION, EMULSION INTRAVENOUS at 12:30

## 2023-08-07 RX ADMIN — GLYCOPYRROLATE 0.6 MG: 0.2 INJECTION INTRAMUSCULAR; INTRAVENOUS at 12:36

## 2023-08-07 RX ADMIN — IPRATROPIUM BROMIDE AND ALBUTEROL SULFATE 1 DOSE: .5; 2.5 SOLUTION RESPIRATORY (INHALATION) at 20:22

## 2023-08-07 RX ADMIN — Medication 3 MCG/MIN: at 12:30

## 2023-08-07 RX ADMIN — PROPOFOL 20 MG: 10 INJECTION, EMULSION INTRAVENOUS at 11:39

## 2023-08-07 RX ADMIN — PHENYLEPHRINE HYDROCHLORIDE 200 MCG: 10 INJECTION INTRAVENOUS at 08:43

## 2023-08-07 RX ADMIN — NOREPINEPHRINE BITARTRATE 3 MCG/MIN: 0.06 INJECTION, SOLUTION INTRAVENOUS at 12:30

## 2023-08-07 RX ADMIN — PROTAMINE SULFATE 300 MG: 10 INJECTION, SOLUTION INTRAVENOUS at 11:32

## 2023-08-07 RX ADMIN — CALCIUM CHLORIDE INJECTION 1 G: 100 INJECTION, SOLUTION INTRAVENOUS at 11:32

## 2023-08-07 RX ADMIN — SODIUM CHLORIDE: 9 INJECTION, SOLUTION INTRAVENOUS at 08:29

## 2023-08-07 RX ADMIN — VECURONIUM BROMIDE 10 MG: 1 INJECTION, POWDER, LYOPHILIZED, FOR SOLUTION INTRAVENOUS at 08:40

## 2023-08-07 RX ADMIN — SODIUM CHLORIDE 0.02 MCG/KG/MIN: 0.9 INJECTION, SOLUTION INTRAVENOUS at 12:12

## 2023-08-07 RX ADMIN — METOPROLOL SUCCINATE 12.5 MG: 25 TABLET, EXTENDED RELEASE ORAL at 04:58

## 2023-08-07 RX ADMIN — 0.12% CHLORHEXIDINE GLUCONATE 15 ML: 1.2 RINSE ORAL at 20:04

## 2023-08-07 RX ADMIN — SODIUM CHLORIDE, SODIUM LACTATE, POTASSIUM CHLORIDE, CALCIUM CHLORIDE: 600; 310; 30; 20 INJECTION, SOLUTION INTRAVENOUS at 09:00

## 2023-08-07 RX ADMIN — PROPOFOL 20 MG: 10 INJECTION, EMULSION INTRAVENOUS at 09:27

## 2023-08-07 RX ADMIN — HYDROMORPHONE HYDROCHLORIDE 0.25 MG: 1 INJECTION, SOLUTION INTRAMUSCULAR; INTRAVENOUS; SUBCUTANEOUS at 13:04

## 2023-08-07 RX ADMIN — 0.12% CHLORHEXIDINE GLUCONATE 15 ML: 1.2 RINSE ORAL at 13:36

## 2023-08-07 RX ADMIN — ACETAMINOPHEN 1000 MG: 500 TABLET ORAL at 19:23

## 2023-08-07 RX ADMIN — NOREPINEPHRINE BITARTRATE 4 MCG: 1 INJECTION, SOLUTION, CONCENTRATE INTRAVENOUS at 11:42

## 2023-08-07 RX ADMIN — LIDOCAINE HYDROCHLORIDE 80 MG: 20 INJECTION, SOLUTION INTRAVENOUS at 08:39

## 2023-08-07 RX ADMIN — FENTANYL CITRATE 50 MCG: 50 INJECTION, SOLUTION INTRAMUSCULAR; INTRAVENOUS at 09:38

## 2023-08-07 RX ADMIN — FENTANYL CITRATE 150 MCG: 50 INJECTION, SOLUTION INTRAMUSCULAR; INTRAVENOUS at 09:24

## 2023-08-07 RX ADMIN — CEFAZOLIN 2 G: 1 INJECTION, POWDER, FOR SOLUTION INTRAMUSCULAR; INTRAVENOUS at 09:04

## 2023-08-07 RX ADMIN — ALBUMIN (HUMAN) 25 G: 12.5 INJECTION, SOLUTION INTRAVENOUS at 12:30

## 2023-08-07 RX ADMIN — NOREPINEPHRINE BITARTRATE 4 MCG: 1 INJECTION, SOLUTION, CONCENTRATE INTRAVENOUS at 11:51

## 2023-08-07 RX ADMIN — AMIODARONE HYDROCHLORIDE 1 MG/MIN: 50 INJECTION, SOLUTION INTRAVENOUS at 22:54

## 2023-08-07 RX ADMIN — PROPOFOL 40 MG: 10 INJECTION, EMULSION INTRAVENOUS at 09:17

## 2023-08-07 RX ADMIN — PROPOFOL 30 MG: 10 INJECTION, EMULSION INTRAVENOUS at 09:23

## 2023-08-07 RX ADMIN — VECURONIUM BROMIDE 5 MG: 1 INJECTION, POWDER, LYOPHILIZED, FOR SOLUTION INTRAVENOUS at 09:20

## 2023-08-07 RX ADMIN — PHENYLEPHRINE HYDROCHLORIDE 600 MCG: 10 INJECTION INTRAVENOUS at 10:02

## 2023-08-07 RX ADMIN — NOREPINEPHRINE BITARTRATE 8 MCG: 1 INJECTION, SOLUTION, CONCENTRATE INTRAVENOUS at 11:36

## 2023-08-07 RX ADMIN — PROPOFOL 70 MG: 10 INJECTION, EMULSION INTRAVENOUS at 08:39

## 2023-08-07 RX ADMIN — Medication 3 MG: at 12:36

## 2023-08-07 RX ADMIN — NITROGLYCERIN 10 MCG: 5 INJECTION, SOLUTION INTRAVENOUS at 09:27

## 2023-08-07 RX ADMIN — SODIUM CHLORIDE, POTASSIUM CHLORIDE, SODIUM LACTATE AND CALCIUM CHLORIDE: 600; 310; 30; 20 INJECTION, SOLUTION INTRAVENOUS at 09:47

## 2023-08-07 RX ADMIN — FENTANYL CITRATE 100 MCG: 50 INJECTION, SOLUTION INTRAMUSCULAR; INTRAVENOUS at 09:19

## 2023-08-07 RX ADMIN — Medication 50 MEQ: at 17:34

## 2023-08-07 RX ADMIN — AMIODARONE HYDROCHLORIDE 150 MG: 50 INJECTION, SOLUTION INTRAVENOUS at 17:12

## 2023-08-07 RX ADMIN — SODIUM CHLORIDE, PRESERVATIVE FREE 10 ML: 5 INJECTION INTRAVENOUS at 20:03

## 2023-08-07 RX ADMIN — SODIUM CHLORIDE 40 MG: 9 INJECTION INTRAMUSCULAR; INTRAVENOUS; SUBCUTANEOUS at 13:37

## 2023-08-07 RX ADMIN — ALBUMIN (HUMAN) 25 G: 12.5 INJECTION, SOLUTION INTRAVENOUS at 15:24

## 2023-08-07 RX ADMIN — AMIODARONE HYDROCHLORIDE 0.5 MG/MIN: 50 INJECTION, SOLUTION INTRAVENOUS at 13:59

## 2023-08-07 RX ADMIN — PHENYLEPHRINE HYDROCHLORIDE 200 MCG: 10 INJECTION INTRAVENOUS at 09:45

## 2023-08-07 RX ADMIN — ALBUMIN (HUMAN) 25 G: 12.5 INJECTION, SOLUTION INTRAVENOUS at 22:15

## 2023-08-07 RX ADMIN — FENTANYL CITRATE 100 MCG: 50 INJECTION, SOLUTION INTRAMUSCULAR; INTRAVENOUS at 09:17

## 2023-08-07 RX ADMIN — NOREPINEPHRINE BITARTRATE 8 MCG: 1 INJECTION, SOLUTION, CONCENTRATE INTRAVENOUS at 11:40

## 2023-08-07 RX ADMIN — HEPARIN SODIUM 35000 UNITS: 10000 INJECTION INTRAVENOUS; SUBCUTANEOUS at 09:46

## 2023-08-07 RX ADMIN — ASPIRIN 81 MG: 81 TABLET, CHEWABLE ORAL at 13:37

## 2023-08-07 RX ADMIN — CEFAZOLIN 2 G: 1 INJECTION, POWDER, FOR SOLUTION INTRAMUSCULAR; INTRAVENOUS at 11:49

## 2023-08-07 RX ADMIN — OXYCODONE HYDROCHLORIDE 5 MG: 5 TABLET ORAL at 20:03

## 2023-08-07 RX ADMIN — FENTANYL CITRATE 150 MCG: 50 INJECTION, SOLUTION INTRAMUSCULAR; INTRAVENOUS at 08:47

## 2023-08-07 RX ADMIN — SODIUM BICARBONATE 50 MEQ: 84 INJECTION, SOLUTION INTRAVENOUS at 17:34

## 2023-08-07 RX ADMIN — AMINOCAPROIC ACID 1 G/HR: 250 INJECTION, SOLUTION INTRAVENOUS at 09:05

## 2023-08-07 RX ADMIN — VECURONIUM BROMIDE 5 MG: 1 INJECTION, POWDER, LYOPHILIZED, FOR SOLUTION INTRAVENOUS at 11:11

## 2023-08-07 RX ADMIN — IPRATROPIUM BROMIDE AND ALBUTEROL SULFATE 1 DOSE: .5; 2.5 SOLUTION RESPIRATORY (INHALATION) at 16:22

## 2023-08-07 RX ADMIN — CEFAZOLIN 2000 MG: 2 INJECTION, POWDER, FOR SOLUTION INTRAMUSCULAR; INTRAVENOUS at 20:22

## 2023-08-07 RX ADMIN — 0.12% CHLORHEXIDINE GLUCONATE 15 ML: 1.2 RINSE ORAL at 05:00

## 2023-08-07 RX ADMIN — INSULIN LISPRO 4 UNITS: 100 INJECTION, SOLUTION INTRAVENOUS; SUBCUTANEOUS at 16:33

## 2023-08-07 RX ADMIN — MUPIROCIN: 20 OINTMENT TOPICAL at 13:36

## 2023-08-07 RX ADMIN — PHENYLEPHRINE HYDROCHLORIDE 200 MCG: 10 INJECTION INTRAVENOUS at 08:45

## 2023-08-07 RX ADMIN — ATORVASTATIN CALCIUM 40 MG: 40 TABLET, FILM COATED ORAL at 20:04

## 2023-08-07 RX ADMIN — SODIUM CHLORIDE: 9 INJECTION, SOLUTION INTRAVENOUS at 12:30

## 2023-08-07 RX ADMIN — FENTANYL CITRATE 100 MCG: 50 INJECTION, SOLUTION INTRAMUSCULAR; INTRAVENOUS at 09:29

## 2023-08-07 RX ADMIN — MUPIROCIN: 20 OINTMENT TOPICAL at 20:21

## 2023-08-07 RX ADMIN — ACETAMINOPHEN 1000 MG: 500 TABLET ORAL at 13:37

## 2023-08-07 RX ADMIN — NITROGLYCERIN 10 MCG: 5 INJECTION, SOLUTION INTRAVENOUS at 09:34

## 2023-08-07 RX ADMIN — NITROGLYCERIN 10 MCG: 5 INJECTION, SOLUTION INTRAVENOUS at 09:20

## 2023-08-07 RX ADMIN — NOREPINEPHRINE BITARTRATE 4 MCG: 1 INJECTION, SOLUTION, CONCENTRATE INTRAVENOUS at 12:16

## 2023-08-07 RX ADMIN — MIDAZOLAM 2 MG: 1 INJECTION INTRAMUSCULAR; INTRAVENOUS at 08:35

## 2023-08-07 RX ADMIN — KETOROLAC TROMETHAMINE 15 MG: 30 INJECTION, SOLUTION INTRAMUSCULAR; INTRAVENOUS at 13:37

## 2023-08-07 RX ADMIN — SODIUM CHLORIDE, POTASSIUM CHLORIDE, SODIUM LACTATE AND CALCIUM CHLORIDE: 600; 310; 30; 20 INJECTION, SOLUTION INTRAVENOUS at 09:00

## 2023-08-07 RX ADMIN — MIDAZOLAM 2 MG: 1 INJECTION INTRAMUSCULAR; INTRAVENOUS at 08:29

## 2023-08-07 RX ADMIN — AMINOCAPROIC ACID 5000 MG: 250 INJECTION, SOLUTION INTRAVENOUS at 09:05

## 2023-08-07 RX ADMIN — SENNOSIDES AND DOCUSATE SODIUM 1 TABLET: 50; 8.6 TABLET ORAL at 20:03

## 2023-08-07 RX ADMIN — FENTANYL CITRATE 100 MCG: 50 INJECTION, SOLUTION INTRAMUSCULAR; INTRAVENOUS at 09:34

## 2023-08-07 ASSESSMENT — PULMONARY FUNCTION TESTS
PIF_VALUE: 21
PIF_VALUE: 21
PIF_VALUE: 17
PIF_VALUE: 24
PIF_VALUE: 16
PIF_VALUE: 17
PIF_VALUE: 23
PIF_VALUE: 17
PIF_VALUE: 16
PIF_VALUE: 19

## 2023-08-07 ASSESSMENT — PAIN DESCRIPTION - ORIENTATION
ORIENTATION: MID
ORIENTATION: MID

## 2023-08-07 ASSESSMENT — PAIN DESCRIPTION - PAIN TYPE: TYPE: SURGICAL PAIN

## 2023-08-07 ASSESSMENT — PAIN DESCRIPTION - LOCATION
LOCATION: CHEST
LOCATION: STERNUM;INCISION
LOCATION: CHEST

## 2023-08-07 ASSESSMENT — PAIN SCALES - GENERAL
PAINLEVEL_OUTOF10: 0
PAINLEVEL_OUTOF10: 8
PAINLEVEL_OUTOF10: 0
PAINLEVEL_OUTOF10: 6
PAINLEVEL_OUTOF10: 5
PAINLEVEL_OUTOF10: 3

## 2023-08-07 ASSESSMENT — PAIN DESCRIPTION - FREQUENCY: FREQUENCY: CONTINUOUS

## 2023-08-07 ASSESSMENT — PAIN DESCRIPTION - ONSET: ONSET: ON-GOING

## 2023-08-07 ASSESSMENT — PAIN DESCRIPTION - DESCRIPTORS
DESCRIPTORS: ACHING;SORE
DESCRIPTORS: ACHING;TENDER
DESCRIPTORS: ACHING

## 2023-08-07 ASSESSMENT — PAIN - FUNCTIONAL ASSESSMENT: PAIN_FUNCTIONAL_ASSESSMENT: PREVENTS OR INTERFERES SOME ACTIVE ACTIVITIES AND ADLS

## 2023-08-07 NOTE — FLOWSHEET NOTE
Patient Aracelis Oneal admitted to CVICU at 1230, Hemodynamic lines leveled and calibrated. Santos catheter in place from surgery draining. Chest tubes connected to continuous suction. No air leak present. Admitting blood work sent to lab. ABGs sent to respiratory. Chest Xray done.

## 2023-08-07 NOTE — ANESTHESIA PROCEDURE NOTES
Central Venous Line:    A central venous line was placed using ultrasound guidance, in the OR for the following indication(s): central venous access and CVP monitoring. Sterility preparation included the following: hand hygiene performed prior to procedure, maximum sterile barriers used and sterile technique used to drape from head to toe. The patient was placed in supine position. The right internal jugular vein was prepped. The site was prepped with Chloraprep. A 8.5 Fr (size), 10 (length), introducer slick was placed. During the procedure, the following specific steps were taken: target vein identified, needle advanced into vein and blood aspirated and guidewire advanced into vein. Intravenous verification was obtained by ultrasound and venous blood return. Post insertion care included: all ports aspirated, all ports flushed easily, guidewire removed intact, Biopatch applied, line sutured in place and dressing applied. During the procedure the patient experienced: patient tolerated procedure well with no complications.       Outcomes: uncomplicated  Real-time US image taken/store: yes  Anesthesia type: local..No  Staffing  Performed: Anesthesiologist   Anesthesiologist: Ronald Steven MD  Preanesthetic Checklist  Completed: patient identified, IV checked, site marked, risks and benefits discussed, surgical/procedural consents, equipment checked, pre-op evaluation, timeout performed, anesthesia consent given, oxygen available and monitors applied/VS acknowledged

## 2023-08-07 NOTE — ANESTHESIA PROCEDURE NOTES
Arterial Line:    An arterial line was placed using ultrasound guidance, in the OR for the following indication(s): continuous blood pressure monitoring and blood sampling needed. A 20 gauge (size), 5 cm (length), Arrow (type) catheter was placed, Seldinger technique used, into the right brachial artery and Lidocaine 1%, secured by Tegaderm. Anesthesia type: Local  Local infiltration: Injection    Events:  patient tolerated procedure well with no complications.   Anesthesiologist: Abby Duenas MD  Performed: Anesthesiologist   Preanesthetic Checklist  Completed: patient identified, IV checked, site marked, risks and benefits discussed, surgical/procedural consents, equipment checked, pre-op evaluation, timeout performed, anesthesia consent given, oxygen available, monitors applied/VS acknowledged, fire risk safety assessment completed and verbalized and blood product R/B/A discussed and consented

## 2023-08-07 NOTE — BRIEF OP NOTE
Brief Postoperative Note    Henok Orozco  YOB: 1955  [id number]    Pre-operative Diagnosis: NSTEMI, CAD    Post-operative Diagnosis: Same    Operation: Urgent sternotomy/Excision of LIMA lymph node/Urgent CABG x 3 (LIMA-LAD, SVG-OM, SVG-PDA)/Right and left cryo and bipolar MAZE procedure/RAVEN exclusion with 35mm AtriClip/EVH RLE/Rigid internal fixation of the sternum with [place] plates x 2    Anesthesia: General    Surgeon: Salima Marcelo    Assistants: Angelo/Hari    Estimated Blood Loss: 1861    Complications: None    Specimens:   ID Type Source Tests Collected by Time Destination   A : MAMMARY LYMPH NODE Tissue Heart SURGICAL PATHOLOGY Gin Holt MD 8/7/2023 0935        Implants:  Implant Name Type Inv.  Item Serial No.  Lot No. LRB No. Used Action   DEVICE OCCL CLP L35MM PLUNG GRP FLX SHFT FOR GILLINOV - [id number]  DEVICE OCCL CLP L35MM PLUNG GRP FLX SHFT FOR GILLINOV  [employer]WD [id number] N/A 1 Implanted   PLATE FIXATION STERNAL LADDER 10 HOLES - [id number]  PLATE FIXATION STERNAL LADDER 10 HOLES  [employer] KARLA-WD  N/A 1 Implanted   SCREW BNE SELF DRILLING 2.3X14 MM LCK - [id number]  SCREW BNE SELF DRILLING 2.3X14 MM LCK  [employer] HOW-WD  N/A 10 Implanted   PLATE FIXATION BOX STERNAL 4 HOLES - [id number]  PLATE FIXATION BOX STERNAL 4 HOLES  [employer] KARLA-WD  N/A 1 Implanted   SCREW LOCKING SD 2.3X16MM 5P - [id number]  SCREW LOCKING SD 2.3X16MM 5P  [employer] KARLA-WD  N/A 4 Implanted         Drains:   Chest Tube Anterior Mediastinal 1 (Active)       Chest Tube Anterior Mediastinal 2 (Active)       Chest Tube Anterior Pleural 3 (Active)       Chest Tube Anterior Pleural 4 (Active)       NG/OG/NJ/NE Tube Orogastric Center mouth (Active)       Urinary Catheter 08/07/23 Santos-Temperature (Active)       Findings: small DAVID and small LAD      Electronically signed by Gin Holt MD on 8/7/2023 at 12:11 PM

## 2023-08-07 NOTE — ANESTHESIA PRE PROCEDURE
Department of Anesthesiology  Preprocedure Note       Name:  Júnior Nicole   Age:  75 y.o.  :  1955                                          MRN:  [id number]         Date:  2023      Surgeon: Jennifer Avila):  Marie Bradley MD    Procedure: Procedure(s):  CABG WITH MAZE PROCEDURE    Medications prior to admission:   Prior to Admission medications    Medication Sig Start Date End Date Taking? Authorizing Provider   lisinopril (PRINIVIL;ZESTRIL) 20 MG tablet Take 1 tablet by mouth daily   Yes Historical Provider, MD   albuterol sulfate  (90 Base) MCG/ACT inhaler Inhale 2 puffs into the lungs 4 times daily  Patient not taking: Reported on 8/3/2023 9/3/21   SUSY Koehler CNP   fluticasone (FLONASE) 50 MCG/ACT nasal spray 1 spray by Each Nostril route daily 9/3/21   SUSY Koehler CNP   pantoprazole (PROTONIX) 40 MG tablet Take 1 tablet by mouth every morning (before breakfast)  Patient not taking: Reported on 8/3/2023 9/4/21   SUSY Koehler CNP   gemfibrozil (LOPID) 600 MG tablet Take 1 tablet by mouth 2 times daily (before meals)    Historical Provider, MD   atorvastatin (LIPITOR) 40 MG tablet TAKE 1 TABLET BY MOUTH EVERY DAY 21   Yovana Carrillo MD   colchicine (COLCRYS) 0.6 MG tablet Take 1 tablet by mouth daily for 14 days 1 tab po qd  Patient not taking: Reported on 11/3/2020 9/8/20 11/3/20  Candida Marshall DPM   etodolac (LODINE) 400 MG tablet Take 1 tablet by mouth 2 times daily 20  Candida Marshall DPM   allopurinol (ZYLOPRIM) 100 MG tablet TAKE 1 TABLET BY MOUTH EVERY DAY 20   Historical Provider, MD   Blood Pressure KIT Check BP  Once daily 20   Yovana Carrillo MD   ibuprofen (IBU) 600 MG tablet Take 1 tablet by mouth every 8 hours as needed for Pain Take with food.  6/3/19   Ismael Lock DO   aspirin EC 81 MG EC tablet Take 1 tablet by mouth daily    Historical Provider, MD   magnesium gluconate (MAGONATE) 500 MG tablet

## 2023-08-07 NOTE — PLAN OF CARE
Problem: Respiratory - Adult  Goal: Achieves optimal ventilation and oxygenation  Outcome: Progressing  Flowsheets (Taken 8/7/2023 0851)  Achieves optimal ventilation and oxygenation:   Assess for changes in respiratory status   Assess for changes in mentation and behavior   Position to facilitate oxygenation and minimize respiratory effort   Oxygen supplementation based on oxygen saturation or arterial blood gases   Initiate smoking cessation protocol as indicated   Encourage broncho-pulmonary hygiene including cough, deep breathe, incentive spirometry   Assess the need for suctioning and aspirate as needed   Assess and instruct to report shortness of breath or any respiratory difficulty   Respiratory therapy support as indicated  Note: Monitor brethe sounds and cxr encourage pt to cough and deep breathe instruct pt on smi monitor sao2 and abgs     Problem: Cardiovascular - Adult  Goal: Maintains optimal cardiac output and hemodynamic stability  Outcome: Progressing  Flowsheets (Taken 8/7/2023 0851)  Maintains optimal cardiac output and hemodynamic stability:   Monitor blood pressure and heart rate   Monitor urine output and notify Licensed Independent Practitioner for values outside of normal range   Assess for signs of decreased cardiac output   Administer fluid and/or volume expanders as ordered   Administer vasoactive medications as ordered  Note: Monitor vital signs adm meds as ordered monitor fluid balance and lab values replace as ordered     Problem: Infection - Adult  Goal: Absence of infection at discharge  Outcome: Progressing  Flowsheets (Taken 8/7/2023 0851)  Absence of infection at discharge:   Assess and monitor for signs and symptoms of infection   Monitor lab/diagnostic results   Monitor all insertion sites i.e., indwelling lines, tubes and drains   Monitor endotracheal (as able) and nasal secretions for changes in amount and color   [place]ora appropriate cooling/warming therapies per order

## 2023-08-07 NOTE — ANESTHESIA PROCEDURE NOTES
Procedure Performed: MATTHEW       Start Time:        End Time:      Preanesthesia Checklist:  Patient identified, IV assessed, risks and benefits discussed, monitors and equipment assessed, procedure being performed at surgeon's request and anesthesia consent obtained. General Procedure Information  Diagnostic Indications for Echo:  assessment of ascending aorta, hemodynamic monitoring and assessment of valve function  Physician Requesting Echo: Vasile Martin MD  Location performed:  OR  Intubated  Bite block placed  Heart visualized  Probe Insertion:  Easy  Probe Type:  3D  Modalities:  Color flow mapping, 2D only, continuous wave Doppler, pulse wave Doppler, 3D and 2D    Echocardiographic and Doppler Measurements    Ventricles    Right Ventricle:  Cavity size normal.  Hypertrophy not present. Thrombus not present. Global function normal.    Left Ventricle:  Cavity size normal.  Hypertrophy not present. Thrombus not present. Global Function normal.  Ejection Fraction 70%. Ventricular Regional Function:  1- Basal Anteroseptal:  normal  2- Basal Anterior:  normal  3- Basal Anterolateral:  normal  4- Basal Inferolateral:  normal  5- Basal Inferior:  normal  6- Basal Inferoseptal:  normal  7- Mid Anteroseptal:  normal  8- Mid Anterior:  normal  9- Mid Anterolateral:  normal  10- Mid Inferolateral:  normal  11- Mid Inferior:  normal  12- Mid Inferoseptal:  normal  13- Apical Anterior:  normal  14- Apical Lateral:  normal  15- Apical Inferior:  normal  16- Apical Septal:  normal  17- [place]:  normal      Valves    Aortic Valve: Annulus normal.  Stenosis not present. Regurgitation mild. Leaflets normal.  Leaflet motions normal.      Mitral Valve: Annulus normal.  Stenosis not present. Regurgitation mild. Leaflets normal.  Leaflet motions normal.      Tricuspid Valve: Annulus normal.  Stenosis not present. Regurgitation none. Leaflets normal.  Leaflet motions normal.    Pulmonic Valve:   Annulus normal.

## 2023-08-07 NOTE — ANESTHESIA POSTPROCEDURE EVALUATION
Department of Anesthesiology  Postprocedure Note    Patient: Robin Sharma  MRN: [id number]  YOB: 1955  Date of evaluation: 8/7/2023      Procedure Summary     Date: 08/07/23 Room / Location: [facility] OR 01 / [facility]    Anesthesia Start: 0937 Anesthesia Stop: 4744    Procedure: CABG WITH MAZE PROCEDURE Diagnosis:       CAD (coronary artery disease)      (CAD (coronary artery disease) [I25.10])    Surgeons: Vasile Martin MD Responsible Provider: Noa Bolaños MD    Anesthesia Type: General ASA Status: 4          Anesthesia Type: General    Rajani Phase I: Rajani Score: 7    Rajani Phase II:        Anesthesia Post Evaluation    Patient location during evaluation: ICU  Patient participation: complete - patient cannot participate  Level of consciousness: sedated and ventilated  Pain score: 0  Airway patency: patent  Nausea & Vomiting: no nausea  Complications: no  Cardiovascular status: hemodynamically stable  Respiratory status: ventilator  Hydration status: stable  Multimodal analgesia pain management approach

## 2023-08-08 ENCOUNTER — APPOINTMENT (OUTPATIENT)
Dept: GENERAL RADIOLOGY | Age: 68
DRG: 234 | End: 2023-08-08
Payer: MEDICARE

## 2023-08-08 LAB
ANION GAP SERPL CALCULATED.3IONS-SCNC: 14 MMOL/L (ref 7–16)
BUN SERPL-MCNC: 19 MG/DL (ref 6–23)
CA-I BLD-SCNC: 1.12 MMOL/L (ref 1.15–1.33)
CALCIUM SERPL-MCNC: 8.2 MG/DL (ref 8.6–10.2)
CHLORIDE SERPL-SCNC: 106 MMOL/L (ref 98–107)
CO2 SERPL-SCNC: 20 MMOL/L (ref 22–29)
CREAT SERPL-MCNC: 0.8 MG/DL (ref 0.7–1.2)
ERYTHROCYTE [DISTWIDTH] IN BLOOD BY AUTOMATED COUNT: 13.4 % (ref 11.5–15)
GFR SERPL CREATININE-BSD FRML MDRD: >60 ML/MIN/1.73M2
GLUCOSE SERPL-MCNC: 165 MG/DL (ref 74–99)
HCT VFR BLD AUTO: 31.8 % (ref 37–54)
HGB BLD-MCNC: 10.3 G/DL (ref 12.5–16.5)
INR PPP: 1.3
MAGNESIUM SERPL-MCNC: 2.3 MG/DL (ref 1.6–2.6)
MCH RBC QN AUTO: 29.9 PG (ref 26–35)
MCHC RBC AUTO-ENTMCNC: 32.4 G/DL (ref 32–34.5)
MCV RBC AUTO: 92.4 FL (ref 80–99.9)
METER GLUCOSE: 134 MG/DL (ref 74–99)
METER GLUCOSE: 145 MG/DL (ref 74–99)
METER GLUCOSE: 148 MG/DL (ref 74–99)
METER GLUCOSE: 154 MG/DL (ref 74–99)
METER GLUCOSE: 159 MG/DL (ref 74–99)
PLATELET # BLD AUTO: 195 K/UL (ref 130–450)
PMV BLD AUTO: 10.4 FL (ref 7–12)
POTASSIUM SERPL-SCNC: 3.6 MMOL/L (ref 3.5–5)
POTASSIUM SERPL-SCNC: 4 MMOL/L (ref 3.5–5)
POTASSIUM SERPL-SCNC: 4 MMOL/L (ref 3.5–5)
PROTHROMBIN TIME: 14.4 SEC (ref 9.3–12.4)
RBC # BLD AUTO: 3.44 M/UL (ref 3.8–5.8)
SODIUM SERPL-SCNC: 140 MMOL/L (ref 132–146)
WBC OTHER # BLD: 10.9 K/UL (ref 4.5–11.5)

## 2023-08-08 PROCEDURE — 94669 MECHANICAL CHEST WALL OSCILL: CPT

## 2023-08-08 PROCEDURE — 2580000003 HC RX 258

## 2023-08-08 PROCEDURE — 36415 COLL VENOUS BLD VENIPUNCTURE: CPT

## 2023-08-08 PROCEDURE — 84132 ASSAY OF SERUM POTASSIUM: CPT

## 2023-08-08 PROCEDURE — 71045 X-RAY EXAM CHEST 1 VIEW: CPT

## 2023-08-08 PROCEDURE — 82330 ASSAY OF CALCIUM: CPT

## 2023-08-08 PROCEDURE — 6370000000 HC RX 637 (ALT 250 FOR IP)

## 2023-08-08 PROCEDURE — 93798 PHYS/QHP OP CAR RHAB W/ECG: CPT

## 2023-08-08 PROCEDURE — 6360000002 HC RX W HCPCS: Performed by: NURSE PRACTITIONER

## 2023-08-08 PROCEDURE — 83735 ASSAY OF MAGNESIUM: CPT

## 2023-08-08 PROCEDURE — 6370000000 HC RX 637 (ALT 250 FOR IP): Performed by: NURSE PRACTITIONER

## 2023-08-08 PROCEDURE — 2580000003 HC RX 258: Performed by: NURSE PRACTITIONER

## 2023-08-08 PROCEDURE — 97535 SELF CARE MNGMENT TRAINING: CPT

## 2023-08-08 PROCEDURE — 97162 PT EVAL MOD COMPLEX 30 MIN: CPT

## 2023-08-08 PROCEDURE — 85027 COMPLETE CBC AUTOMATED: CPT

## 2023-08-08 PROCEDURE — 82962 GLUCOSE BLOOD TEST: CPT

## 2023-08-08 PROCEDURE — 2140000000 HC CCU INTERMEDIATE R&B

## 2023-08-08 PROCEDURE — 94640 AIRWAY INHALATION TREATMENT: CPT

## 2023-08-08 PROCEDURE — 80048 BASIC METABOLIC PNL TOTAL CA: CPT

## 2023-08-08 PROCEDURE — 97530 THERAPEUTIC ACTIVITIES: CPT

## 2023-08-08 PROCEDURE — P9047 ALBUMIN (HUMAN), 25%, 50ML: HCPCS | Performed by: NURSE PRACTITIONER

## 2023-08-08 PROCEDURE — 97166 OT EVAL MOD COMPLEX 45 MIN: CPT

## 2023-08-08 PROCEDURE — 6370000000 HC RX 637 (ALT 250 FOR IP): Performed by: PHYSICIAN ASSISTANT

## 2023-08-08 PROCEDURE — 2500000003 HC RX 250 WO HCPCS

## 2023-08-08 PROCEDURE — 2700000000 HC OXYGEN THERAPY PER DAY

## 2023-08-08 PROCEDURE — 99233 SBSQ HOSP IP/OBS HIGH 50: CPT | Performed by: INTERNAL MEDICINE

## 2023-08-08 PROCEDURE — 85610 PROTHROMBIN TIME: CPT

## 2023-08-08 PROCEDURE — 6360000002 HC RX W HCPCS

## 2023-08-08 PROCEDURE — 37799 UNLISTED PX VASCULAR SURGERY: CPT

## 2023-08-08 RX ORDER — ALBUMIN (HUMAN) 12.5 G/50ML
25 SOLUTION INTRAVENOUS ONCE
Status: COMPLETED | OUTPATIENT
Start: 2023-08-08 | End: 2023-08-08

## 2023-08-08 RX ORDER — POTASSIUM CHLORIDE 20 MEQ/1
20 TABLET, EXTENDED RELEASE ORAL PRN
Status: DISCONTINUED | OUTPATIENT
Start: 2023-08-08 | End: 2023-08-10 | Stop reason: HOSPADM

## 2023-08-08 RX ORDER — ENOXAPARIN SODIUM 100 MG/ML
40 INJECTION SUBCUTANEOUS DAILY
Status: DISCONTINUED | OUTPATIENT
Start: 2023-08-08 | End: 2023-08-10 | Stop reason: HOSPADM

## 2023-08-08 RX ORDER — INSULIN LISPRO 100 [IU]/ML
0-8 INJECTION, SOLUTION INTRAVENOUS; SUBCUTANEOUS
Status: DISCONTINUED | OUTPATIENT
Start: 2023-08-08 | End: 2023-08-10 | Stop reason: HOSPADM

## 2023-08-08 RX ORDER — BISACODYL 5 MG/1
10 TABLET, DELAYED RELEASE ORAL DAILY PRN
Status: DISCONTINUED | OUTPATIENT
Start: 2023-08-08 | End: 2023-08-10 | Stop reason: HOSPADM

## 2023-08-08 RX ORDER — BISACODYL 10 MG
10 SUPPOSITORY, RECTAL RECTAL DAILY
Status: DISCONTINUED | OUTPATIENT
Start: 2023-08-08 | End: 2023-08-10 | Stop reason: HOSPADM

## 2023-08-08 RX ORDER — DIPHENHYDRAMINE HCL 25 MG
25 TABLET ORAL EVERY 8 HOURS PRN
Status: DISCONTINUED | OUTPATIENT
Start: 2023-08-08 | End: 2023-08-10 | Stop reason: HOSPADM

## 2023-08-08 RX ORDER — ISOSORBIDE DINITRATE 10 MG/1
10 TABLET ORAL 3 TIMES DAILY
Status: DISCONTINUED | OUTPATIENT
Start: 2023-08-08 | End: 2023-08-10

## 2023-08-08 RX ORDER — PROCHLORPERAZINE EDISYLATE 5 MG/ML
10 INJECTION INTRAMUSCULAR; INTRAVENOUS EVERY 6 HOURS PRN
Status: DISCONTINUED | OUTPATIENT
Start: 2023-08-08 | End: 2023-08-10 | Stop reason: HOSPADM

## 2023-08-08 RX ORDER — SENNA AND DOCUSATE SODIUM 50; 8.6 MG/1; MG/1
1 TABLET, FILM COATED ORAL 2 TIMES DAILY
Status: DISCONTINUED | OUTPATIENT
Start: 2023-08-08 | End: 2023-08-10 | Stop reason: HOSPADM

## 2023-08-08 RX ORDER — BISACODYL 5 MG/1
5 TABLET, DELAYED RELEASE ORAL DAILY
Status: DISCONTINUED | OUTPATIENT
Start: 2023-08-08 | End: 2023-08-10 | Stop reason: HOSPADM

## 2023-08-08 RX ORDER — ACETAMINOPHEN 325 MG/1
650 TABLET ORAL EVERY 4 HOURS PRN
Status: DISCONTINUED | OUTPATIENT
Start: 2023-08-08 | End: 2023-08-10 | Stop reason: HOSPADM

## 2023-08-08 RX ORDER — AMIODARONE HYDROCHLORIDE 200 MG/1
400 TABLET ORAL 2 TIMES DAILY
Status: DISCONTINUED | OUTPATIENT
Start: 2023-08-08 | End: 2023-08-10 | Stop reason: HOSPADM

## 2023-08-08 RX ORDER — DEXTROSE MONOHYDRATE 100 MG/ML
INJECTION, SOLUTION INTRAVENOUS CONTINUOUS PRN
Status: DISCONTINUED | OUTPATIENT
Start: 2023-08-08 | End: 2023-08-10 | Stop reason: HOSPADM

## 2023-08-08 RX ORDER — HYDROMORPHONE HYDROCHLORIDE 1 MG/ML
0.25 INJECTION, SOLUTION INTRAMUSCULAR; INTRAVENOUS; SUBCUTANEOUS EVERY 4 HOURS PRN
Status: DISCONTINUED | OUTPATIENT
Start: 2023-08-08 | End: 2023-08-10 | Stop reason: HOSPADM

## 2023-08-08 RX ORDER — LORAZEPAM 1 MG/1
1 TABLET ORAL EVERY 8 HOURS PRN
Status: DISCONTINUED | OUTPATIENT
Start: 2023-08-08 | End: 2023-08-10 | Stop reason: HOSPADM

## 2023-08-08 RX ORDER — MAGNESIUM SULFATE IN WATER 40 MG/ML
2000 INJECTION, SOLUTION INTRAVENOUS PRN
Status: DISCONTINUED | OUTPATIENT
Start: 2023-08-08 | End: 2023-08-10 | Stop reason: HOSPADM

## 2023-08-08 RX ORDER — INSULIN LISPRO 100 [IU]/ML
0-8 INJECTION, SOLUTION INTRAVENOUS; SUBCUTANEOUS NIGHTLY
Status: DISCONTINUED | OUTPATIENT
Start: 2023-08-08 | End: 2023-08-10 | Stop reason: HOSPADM

## 2023-08-08 RX ORDER — FOLIC ACID 1 MG/1
1 TABLET ORAL DAILY
Status: DISCONTINUED | OUTPATIENT
Start: 2023-08-08 | End: 2023-08-10 | Stop reason: HOSPADM

## 2023-08-08 RX ORDER — ASCORBIC ACID 500 MG
500 TABLET ORAL 2 TIMES DAILY
Status: DISCONTINUED | OUTPATIENT
Start: 2023-08-08 | End: 2023-08-10 | Stop reason: HOSPADM

## 2023-08-08 RX ORDER — FERROUS SULFATE 325(65) MG
325 TABLET ORAL 2 TIMES DAILY WITH MEALS
Status: DISCONTINUED | OUTPATIENT
Start: 2023-08-08 | End: 2023-08-10 | Stop reason: HOSPADM

## 2023-08-08 RX ORDER — ASPIRIN 81 MG/1
81 TABLET ORAL DAILY
Status: DISCONTINUED | OUTPATIENT
Start: 2023-08-09 | End: 2023-08-10 | Stop reason: HOSPADM

## 2023-08-08 RX ADMIN — MULTIPLE VITAMINS W/ MINERALS TAB 1 TABLET: TAB at 09:39

## 2023-08-08 RX ADMIN — SENNOSIDES AND DOCUSATE SODIUM 1 TABLET: 50; 8.6 TABLET ORAL at 21:32

## 2023-08-08 RX ADMIN — ATORVASTATIN CALCIUM 40 MG: 40 TABLET, FILM COATED ORAL at 21:31

## 2023-08-08 RX ADMIN — Medication 400 MG: at 21:31

## 2023-08-08 RX ADMIN — Medication 500 MG: at 21:32

## 2023-08-08 RX ADMIN — INSULIN LISPRO 4 UNITS: 100 INJECTION, SOLUTION INTRAVENOUS; SUBCUTANEOUS at 00:00

## 2023-08-08 RX ADMIN — DIPHENHYDRAMINE HCL 5 ML: 12.5 LIQUID ORAL at 10:07

## 2023-08-08 RX ADMIN — OXYCODONE HYDROCHLORIDE 10 MG: 10 TABLET ORAL at 21:32

## 2023-08-08 RX ADMIN — TAMSULOSIN HYDROCHLORIDE 0.4 MG: 0.4 CAPSULE ORAL at 09:39

## 2023-08-08 RX ADMIN — OXYCODONE HYDROCHLORIDE 10 MG: 10 TABLET ORAL at 00:00

## 2023-08-08 RX ADMIN — LORAZEPAM 1 MG: 1 TABLET ORAL at 21:32

## 2023-08-08 RX ADMIN — AMIODARONE HYDROCHLORIDE 400 MG: 200 TABLET ORAL at 21:32

## 2023-08-08 RX ADMIN — HYDROMORPHONE HYDROCHLORIDE 0.5 MG: 1 INJECTION, SOLUTION INTRAMUSCULAR; INTRAVENOUS; SUBCUTANEOUS at 06:15

## 2023-08-08 RX ADMIN — MUPIROCIN: 20 OINTMENT TOPICAL at 09:44

## 2023-08-08 RX ADMIN — CEFAZOLIN 2000 MG: 2 INJECTION, POWDER, FOR SOLUTION INTRAMUSCULAR; INTRAVENOUS at 21:30

## 2023-08-08 RX ADMIN — OXYCODONE HYDROCHLORIDE 10 MG: 10 TABLET ORAL at 08:08

## 2023-08-08 RX ADMIN — SODIUM CHLORIDE, PRESERVATIVE FREE 10 ML: 5 INJECTION INTRAVENOUS at 09:53

## 2023-08-08 RX ADMIN — ISOSORBIDE DINITRATE 10 MG: 10 TABLET ORAL at 09:39

## 2023-08-08 RX ADMIN — ACETAMINOPHEN 1000 MG: 500 TABLET ORAL at 05:19

## 2023-08-08 RX ADMIN — IPRATROPIUM BROMIDE AND ALBUTEROL SULFATE 1 DOSE: .5; 2.5 SOLUTION RESPIRATORY (INHALATION) at 07:48

## 2023-08-08 RX ADMIN — CEFAZOLIN 2000 MG: 2 INJECTION, POWDER, FOR SOLUTION INTRAMUSCULAR; INTRAVENOUS at 03:59

## 2023-08-08 RX ADMIN — PANTOPRAZOLE SODIUM 40 MG: 40 TABLET, DELAYED RELEASE ORAL at 05:19

## 2023-08-08 RX ADMIN — AMIODARONE HYDROCHLORIDE 1 MG/MIN: 50 INJECTION, SOLUTION INTRAVENOUS at 07:09

## 2023-08-08 RX ADMIN — FERROUS SULFATE TAB 325 MG (65 MG ELEMENTAL FE) 325 MG: 325 (65 FE) TAB at 21:31

## 2023-08-08 RX ADMIN — HYDROMORPHONE HYDROCHLORIDE 0.5 MG: 1 INJECTION, SOLUTION INTRAMUSCULAR; INTRAVENOUS; SUBCUTANEOUS at 02:04

## 2023-08-08 RX ADMIN — POTASSIUM CHLORIDE 20 MEQ: 29.8 INJECTION, SOLUTION INTRAVENOUS at 04:07

## 2023-08-08 RX ADMIN — AMIODARONE HYDROCHLORIDE 400 MG: 200 TABLET ORAL at 08:52

## 2023-08-08 RX ADMIN — ENOXAPARIN SODIUM 40 MG: 100 INJECTION SUBCUTANEOUS at 09:07

## 2023-08-08 RX ADMIN — OXYCODONE HYDROCHLORIDE 10 MG: 10 TABLET ORAL at 04:00

## 2023-08-08 RX ADMIN — FOLIC ACID 1 MG: 1 TABLET ORAL at 21:31

## 2023-08-08 RX ADMIN — DIPHENHYDRAMINE HCL 5 ML: 12.5 LIQUID ORAL at 14:04

## 2023-08-08 RX ADMIN — Medication 400 MG: at 09:38

## 2023-08-08 RX ADMIN — ALLOPURINOL 100 MG: 100 TABLET ORAL at 09:40

## 2023-08-08 RX ADMIN — INSULIN LISPRO 4 UNITS: 100 INJECTION, SOLUTION INTRAVENOUS; SUBCUTANEOUS at 04:12

## 2023-08-08 RX ADMIN — SODIUM CHLORIDE, PRESERVATIVE FREE 10 ML: 5 INJECTION INTRAVENOUS at 21:32

## 2023-08-08 RX ADMIN — KETOROLAC TROMETHAMINE 15 MG: 30 INJECTION, SOLUTION INTRAMUSCULAR; INTRAVENOUS at 03:02

## 2023-08-08 RX ADMIN — ALBUMIN (HUMAN) 25 G: 0.25 INJECTION, SOLUTION INTRAVENOUS at 14:48

## 2023-08-08 RX ADMIN — IPRATROPIUM BROMIDE AND ALBUTEROL SULFATE 1 DOSE: .5; 2.5 SOLUTION RESPIRATORY (INHALATION) at 20:12

## 2023-08-08 RX ADMIN — INSULIN LISPRO 2 UNITS: 100 INJECTION, SOLUTION INTRAVENOUS; SUBCUTANEOUS at 21:53

## 2023-08-08 RX ADMIN — ISOSORBIDE DINITRATE 10 MG: 10 TABLET ORAL at 15:00

## 2023-08-08 RX ADMIN — Medication 4000 UNITS: at 09:40

## 2023-08-08 RX ADMIN — KETOROLAC TROMETHAMINE 15 MG: 30 INJECTION, SOLUTION INTRAMUSCULAR; INTRAVENOUS at 09:07

## 2023-08-08 RX ADMIN — ACETAMINOPHEN 1000 MG: 500 TABLET ORAL at 00:00

## 2023-08-08 RX ADMIN — KETOROLAC TROMETHAMINE 15 MG: 30 INJECTION, SOLUTION INTRAMUSCULAR; INTRAVENOUS at 21:31

## 2023-08-08 RX ADMIN — BISACODYL 10 MG: 5 TABLET, COATED ORAL at 13:39

## 2023-08-08 RX ADMIN — IPRATROPIUM BROMIDE AND ALBUTEROL SULFATE 1 DOSE: .5; 2.5 SOLUTION RESPIRATORY (INHALATION) at 12:03

## 2023-08-08 RX ADMIN — DIPHENHYDRAMINE HCL 5 ML: 12.5 LIQUID ORAL at 18:27

## 2023-08-08 RX ADMIN — KETOROLAC TROMETHAMINE 15 MG: 30 INJECTION, SOLUTION INTRAMUSCULAR; INTRAVENOUS at 15:00

## 2023-08-08 RX ADMIN — CEFAZOLIN 2000 MG: 2 INJECTION, POWDER, FOR SOLUTION INTRAMUSCULAR; INTRAVENOUS at 12:41

## 2023-08-08 RX ADMIN — SENNOSIDES AND DOCUSATE SODIUM 1 TABLET: 50; 8.6 TABLET ORAL at 09:38

## 2023-08-08 RX ADMIN — IPRATROPIUM BROMIDE AND ALBUTEROL SULFATE 1 DOSE: .5; 2.5 SOLUTION RESPIRATORY (INHALATION) at 16:03

## 2023-08-08 RX ADMIN — ASPIRIN 81 MG: 81 TABLET, COATED ORAL at 08:08

## 2023-08-08 RX ADMIN — ISOSORBIDE DINITRATE 10 MG: 10 TABLET ORAL at 21:32

## 2023-08-08 RX ADMIN — Medication 100 MG: at 09:39

## 2023-08-08 RX ADMIN — SODIUM CHLORIDE: 9 INJECTION, SOLUTION INTRAVENOUS at 00:49

## 2023-08-08 RX ADMIN — CALCIUM CHLORIDE INJECTION 1000 MG: 100 INJECTION, SOLUTION INTRAVENOUS at 05:53

## 2023-08-08 ASSESSMENT — PAIN SCALES - GENERAL
PAINLEVEL_OUTOF10: 0
PAINLEVEL_OUTOF10: 8
PAINLEVEL_OUTOF10: 7
PAINLEVEL_OUTOF10: 10
PAINLEVEL_OUTOF10: 7
PAINLEVEL_OUTOF10: 5
PAINLEVEL_OUTOF10: 7
PAINLEVEL_OUTOF10: 3
PAINLEVEL_OUTOF10: 0

## 2023-08-08 ASSESSMENT — PAIN DESCRIPTION - LOCATION
LOCATION: CHEST

## 2023-08-08 ASSESSMENT — PAIN DESCRIPTION - DESCRIPTORS
DESCRIPTORS: ACHING;SORE;DISCOMFORT
DESCRIPTORS: ACHING;SORE;DISCOMFORT
DESCRIPTORS: SORE
DESCRIPTORS: ACHING;SORE;DISCOMFORT

## 2023-08-08 ASSESSMENT — PAIN DESCRIPTION - PAIN TYPE: TYPE: SURGICAL PAIN

## 2023-08-08 ASSESSMENT — PAIN DESCRIPTION - ORIENTATION: ORIENTATION: MID

## 2023-08-08 NOTE — PLAN OF CARE
Problem: Discharge Planning  Goal: Discharge to home or other facility with appropriate resources  Outcome: Progressing  Flowsheets (Taken 8/7/2023 2000)  Discharge to home or other facility with appropriate resources: Identify barriers to discharge with patient and caregiver     Problem: Pain  Goal: Verbalizes/displays adequate comfort level or baseline comfort level  Outcome: Progressing  Flowsheets (Taken 8/7/2023 2000)  Verbalizes/displays adequate comfort level or baseline comfort level:   Encourage patient to monitor pain and request assistance   Assess pain using appropriate pain scale   Administer analgesics based on type and severity of pain and evaluate response   Implement non-pharmacological measures as appropriate and evaluate response     Problem: ABCDS Injury Assessment  Goal: Absence of physical injury  Outcome: Progressing  Flowsheets (Taken 8/7/2023 2000)  Absence of Physical Injury: Implement safety measures based on patient assessment     Problem: Safety - Adult  Goal: Free from fall injury  Outcome: Progressing  Flowsheets (Taken 8/7/2023 2000)  Free From Fall Injury: Instruct family/caregiver on patient safety     Problem: Neurosensory - Adult  Goal: Achieves stable or improved neurological status  Outcome: Progressing  Flowsheets (Taken 8/7/2023 2000)  Achieves stable or improved neurological status: Assess for and report changes in neurological status  Goal: Achieves maximal functionality and self care  Outcome: Progressing  Flowsheets (Taken 8/7/2023 2000)  Achieves maximal functionality and self care: Encourage and assist patient to increase activity and self care with guidance from physical therapy/occupational therapy     Problem: Respiratory - Adult  Goal: Achieves optimal ventilation and oxygenation  8/7/2023 2033 by Steven Hodges RN  Outcome: Progressing  Flowsheets (Taken 8/7/2023 2000)  Achieves optimal ventilation and oxygenation:   Assess for changes in respiratory status

## 2023-08-08 NOTE — CARE COORDINATION
8/8 Care Coordination: Pt in CVIC POD#1, CABGx3 . Currently on 3L O2 NC, sats 97%. Spoke with Pt,his wife and daughter. PTA was independent,, Lives with his spouse in a 2 story home. Discharge plan is home. Pt is receptive to [facility]. Has no Hx ROSEMARIE. Had no preference. Will call referral to [facility], spoke with [id number] [address] [facility]. [facility] Saturday. .  . CM/SW will continue to follow for discharge planning.    Paco RAMIREZN,RN-CV-BC  [phone]

## 2023-08-08 NOTE — OP NOTE
in the  mediastinum, one in the left chest and one in the right chest.  With the  patient warm, hemodynamically stable and in sinus rhythm, I closed the  chest using #6 steel wires including double wires as well as [place]  plates x2. The remained of the wound was closed using multiple layers of absorbable stitch. Dry sterile dressing was applied over  the top. The patient tolerated the procedure well. The patient was  transferred to cardiothoracic intensive care unit in stable but guarded  condition. All sponge, instruments, and needle counts were correct at  the end of the case. Tang Rogers NP assisted (no other resident was adequately trained  to be able to assist). Tang Rogers was present assisting throughout  the entire operation from the first incision to the last suture and  every part in between.     Myah Puente MD    D: 08/07/2023 12:46:29       T: 08/07/2023 14:13:15     [email or web address]  Job#: [id number]     Doc#: [id number]    CC:  Kyle Corona, DO April Storey-Cornelius, NP Marvie Kehr, MD Elodia Other, DO Jenice Foots, DPM Jackolyn Buerger, Md Vilma Rasp, Angela Lazaro MD

## 2023-08-08 NOTE — FORENSIC NURSE
Ppt transferred to [facility] via bed and monitor, family took eyeglasses and phone with them to new room

## 2023-08-08 NOTE — PLAN OF CARE
Problem: Pain  Goal: Verbalizes/displays adequate comfort level or baseline comfort level  8/8/2023 1027 by Crystal Chase RN  Outcome: Progressing  8/7/2023 2033 by Carlos Enrique Jamison RN  Outcome: Progressing  Flowsheets (Taken 8/7/2023 2000)  Verbalizes/displays adequate comfort level or baseline comfort level:   Encourage patient to monitor pain and request assistance   Assess pain using appropriate pain scale   Administer analgesics based on type and severity of pain and evaluate response   Implement non-pharmacological measures as appropriate and evaluate response     Problem: ABCDS Injury Assessment  Goal: Absence of physical injury  8/8/2023 1027 by Crystal Chase RN  Outcome: Progressing  8/7/2023 2033 by Carlos Enrique Jamison RN  Outcome: Progressing  Flowsheets (Taken 8/7/2023 2000)  Absence of Physical Injury: Implement safety measures based on patient assessment     Problem: Safety - Adult  Goal: Free from fall injury  8/8/2023 1027 by Crystal Chase RN  Outcome: Progressing  8/7/2023 2033 by Carlos Enrique Jamison RN  Outcome: Progressing  Flowsheets (Taken 8/7/2023 2000)  Free From Fall Injury: Instruct family/caregiver on patient safety

## 2023-08-09 ENCOUNTER — APPOINTMENT (OUTPATIENT)
Dept: GENERAL RADIOLOGY | Age: 68
DRG: 234 | End: 2023-08-09
Payer: MEDICARE

## 2023-08-09 LAB
ANION GAP SERPL CALCULATED.3IONS-SCNC: 8 MMOL/L (ref 7–16)
BUN SERPL-MCNC: 24 MG/DL (ref 6–23)
CALCIUM SERPL-MCNC: 9.4 MG/DL (ref 8.6–10.2)
CHLORIDE SERPL-SCNC: 100 MMOL/L (ref 98–107)
CO2 SERPL-SCNC: 25 MMOL/L (ref 22–29)
CREAT SERPL-MCNC: 0.8 MG/DL (ref 0.7–1.2)
ERYTHROCYTE [DISTWIDTH] IN BLOOD BY AUTOMATED COUNT: 13.7 % (ref 11.5–15)
GFR SERPL CREATININE-BSD FRML MDRD: >60 ML/MIN/1.73M2
GLUCOSE SERPL-MCNC: 134 MG/DL (ref 74–99)
HCT VFR BLD AUTO: 28.9 % (ref 37–54)
HGB BLD-MCNC: 9.3 G/DL (ref 12.5–16.5)
MAGNESIUM SERPL-MCNC: 2.1 MG/DL (ref 1.6–2.6)
MCH RBC QN AUTO: 30 PG (ref 26–35)
MCHC RBC AUTO-ENTMCNC: 32.2 G/DL (ref 32–34.5)
MCV RBC AUTO: 93.2 FL (ref 80–99.9)
METER GLUCOSE: 119 MG/DL (ref 74–99)
METER GLUCOSE: 119 MG/DL (ref 74–99)
METER GLUCOSE: 136 MG/DL (ref 74–99)
METER GLUCOSE: 168 MG/DL (ref 74–99)
PLATELET # BLD AUTO: 167 K/UL (ref 130–450)
PMV BLD AUTO: 11 FL (ref 7–12)
POTASSIUM SERPL-SCNC: 4.1 MMOL/L (ref 3.5–5)
RBC # BLD AUTO: 3.1 M/UL (ref 3.8–5.8)
SODIUM SERPL-SCNC: 133 MMOL/L (ref 132–146)
SURGICAL PATHOLOGY REPORT: NORMAL
WBC OTHER # BLD: 10 K/UL (ref 4.5–11.5)

## 2023-08-09 PROCEDURE — 6360000002 HC RX W HCPCS: Performed by: NURSE PRACTITIONER

## 2023-08-09 PROCEDURE — 99233 SBSQ HOSP IP/OBS HIGH 50: CPT | Performed by: INTERNAL MEDICINE

## 2023-08-09 PROCEDURE — 93798 PHYS/QHP OP CAR RHAB W/ECG: CPT

## 2023-08-09 PROCEDURE — 36415 COLL VENOUS BLD VENIPUNCTURE: CPT

## 2023-08-09 PROCEDURE — 6370000000 HC RX 637 (ALT 250 FOR IP): Performed by: PHYSICIAN ASSISTANT

## 2023-08-09 PROCEDURE — 85027 COMPLETE CBC AUTOMATED: CPT

## 2023-08-09 PROCEDURE — 82962 GLUCOSE BLOOD TEST: CPT

## 2023-08-09 PROCEDURE — 6370000000 HC RX 637 (ALT 250 FOR IP): Performed by: NURSE PRACTITIONER

## 2023-08-09 PROCEDURE — 80048 BASIC METABOLIC PNL TOTAL CA: CPT

## 2023-08-09 PROCEDURE — 2140000000 HC CCU INTERMEDIATE R&B

## 2023-08-09 PROCEDURE — 97530 THERAPEUTIC ACTIVITIES: CPT

## 2023-08-09 PROCEDURE — 94640 AIRWAY INHALATION TREATMENT: CPT

## 2023-08-09 PROCEDURE — 71045 X-RAY EXAM CHEST 1 VIEW: CPT

## 2023-08-09 PROCEDURE — 83735 ASSAY OF MAGNESIUM: CPT

## 2023-08-09 PROCEDURE — 97535 SELF CARE MNGMENT TRAINING: CPT

## 2023-08-09 PROCEDURE — P9047 ALBUMIN (HUMAN), 25%, 50ML: HCPCS | Performed by: NURSE PRACTITIONER

## 2023-08-09 PROCEDURE — 94669 MECHANICAL CHEST WALL OSCILL: CPT

## 2023-08-09 PROCEDURE — 2580000003 HC RX 258: Performed by: NURSE PRACTITIONER

## 2023-08-09 RX ORDER — ALBUMIN (HUMAN) 12.5 G/50ML
50 SOLUTION INTRAVENOUS ONCE
Status: COMPLETED | OUTPATIENT
Start: 2023-08-09 | End: 2023-08-09

## 2023-08-09 RX ORDER — METOCLOPRAMIDE HYDROCHLORIDE 5 MG/ML
10 INJECTION INTRAMUSCULAR; INTRAVENOUS EVERY 6 HOURS
Status: DISCONTINUED | OUTPATIENT
Start: 2023-08-09 | End: 2023-08-10 | Stop reason: HOSPADM

## 2023-08-09 RX ADMIN — FERROUS SULFATE TAB 325 MG (65 MG ELEMENTAL FE) 325 MG: 325 (65 FE) TAB at 17:11

## 2023-08-09 RX ADMIN — OXYCODONE HYDROCHLORIDE 10 MG: 10 TABLET ORAL at 12:06

## 2023-08-09 RX ADMIN — INSULIN LISPRO 2 UNITS: 100 INJECTION, SOLUTION INTRAVENOUS; SUBCUTANEOUS at 21:28

## 2023-08-09 RX ADMIN — ALBUMIN (HUMAN) 50 G: 0.25 INJECTION, SOLUTION INTRAVENOUS at 11:40

## 2023-08-09 RX ADMIN — ASPIRIN 81 MG: 81 TABLET, COATED ORAL at 08:27

## 2023-08-09 RX ADMIN — PSYLLIUM HUSK 1 PACKET: 3.4 POWDER ORAL at 08:29

## 2023-08-09 RX ADMIN — CEFAZOLIN 2000 MG: 2 INJECTION, POWDER, FOR SOLUTION INTRAMUSCULAR; INTRAVENOUS at 04:40

## 2023-08-09 RX ADMIN — ISOSORBIDE DINITRATE 10 MG: 10 TABLET ORAL at 08:27

## 2023-08-09 RX ADMIN — BISACODYL 5 MG: 5 TABLET, COATED ORAL at 08:27

## 2023-08-09 RX ADMIN — MAGNESIUM HYDROXIDE 30 ML: 1200 LIQUID ORAL at 11:37

## 2023-08-09 RX ADMIN — PANTOPRAZOLE SODIUM 40 MG: 40 TABLET, DELAYED RELEASE ORAL at 06:41

## 2023-08-09 RX ADMIN — MULTIPLE VITAMINS W/ MINERALS TAB 1 TABLET: TAB at 08:28

## 2023-08-09 RX ADMIN — SODIUM CHLORIDE, PRESERVATIVE FREE 10 ML: 5 INJECTION INTRAVENOUS at 08:29

## 2023-08-09 RX ADMIN — METOCLOPRAMIDE HYDROCHLORIDE 10 MG: 5 INJECTION INTRAMUSCULAR; INTRAVENOUS at 23:47

## 2023-08-09 RX ADMIN — OXYCODONE HYDROCHLORIDE 10 MG: 10 TABLET ORAL at 06:40

## 2023-08-09 RX ADMIN — FOLIC ACID 1 MG: 1 TABLET ORAL at 08:27

## 2023-08-09 RX ADMIN — IPRATROPIUM BROMIDE AND ALBUTEROL SULFATE 1 DOSE: .5; 2.5 SOLUTION RESPIRATORY (INHALATION) at 15:30

## 2023-08-09 RX ADMIN — ALLOPURINOL 100 MG: 100 TABLET ORAL at 08:27

## 2023-08-09 RX ADMIN — IBUPROFEN 400 MG: 400 TABLET, FILM COATED ORAL at 17:12

## 2023-08-09 RX ADMIN — MUPIROCIN: 20 OINTMENT TOPICAL at 21:31

## 2023-08-09 RX ADMIN — Medication 500 MG: at 08:28

## 2023-08-09 RX ADMIN — KETOROLAC TROMETHAMINE 15 MG: 30 INJECTION, SOLUTION INTRAMUSCULAR; INTRAVENOUS at 02:37

## 2023-08-09 RX ADMIN — SENNOSIDES AND DOCUSATE SODIUM 1 TABLET: 50; 8.6 TABLET ORAL at 21:27

## 2023-08-09 RX ADMIN — ENOXAPARIN SODIUM 40 MG: 100 INJECTION SUBCUTANEOUS at 08:29

## 2023-08-09 RX ADMIN — SENNOSIDES AND DOCUSATE SODIUM 1 TABLET: 50; 8.6 TABLET ORAL at 08:28

## 2023-08-09 RX ADMIN — ACETAMINOPHEN 650 MG: 325 TABLET ORAL at 12:06

## 2023-08-09 RX ADMIN — DIPHENHYDRAMINE HCL 5 ML: 12.5 LIQUID ORAL at 10:16

## 2023-08-09 RX ADMIN — OXYCODONE HYDROCHLORIDE 10 MG: 10 TABLET ORAL at 02:37

## 2023-08-09 RX ADMIN — OXYCODONE HYDROCHLORIDE 5 MG: 5 TABLET ORAL at 21:34

## 2023-08-09 RX ADMIN — Medication 400 MG: at 08:28

## 2023-08-09 RX ADMIN — MUPIROCIN: 20 OINTMENT TOPICAL at 08:42

## 2023-08-09 RX ADMIN — METOCLOPRAMIDE HYDROCHLORIDE 10 MG: 5 INJECTION INTRAMUSCULAR; INTRAVENOUS at 11:37

## 2023-08-09 RX ADMIN — ACETAMINOPHEN 650 MG: 325 TABLET ORAL at 04:39

## 2023-08-09 RX ADMIN — IBUPROFEN 400 MG: 400 TABLET, FILM COATED ORAL at 08:27

## 2023-08-09 RX ADMIN — SODIUM CHLORIDE, PRESERVATIVE FREE 10 ML: 5 INJECTION INTRAVENOUS at 21:28

## 2023-08-09 RX ADMIN — IPRATROPIUM BROMIDE AND ALBUTEROL SULFATE 1 DOSE: .5; 2.5 SOLUTION RESPIRATORY (INHALATION) at 09:28

## 2023-08-09 RX ADMIN — ISOSORBIDE DINITRATE 10 MG: 10 TABLET ORAL at 21:27

## 2023-08-09 RX ADMIN — AMIODARONE HYDROCHLORIDE 400 MG: 200 TABLET ORAL at 21:27

## 2023-08-09 RX ADMIN — ATORVASTATIN CALCIUM 40 MG: 40 TABLET, FILM COATED ORAL at 21:28

## 2023-08-09 RX ADMIN — Medication 500 MG: at 21:27

## 2023-08-09 RX ADMIN — Medication 4000 UNITS: at 08:28

## 2023-08-09 RX ADMIN — METOCLOPRAMIDE HYDROCHLORIDE 10 MG: 5 INJECTION INTRAMUSCULAR; INTRAVENOUS at 17:11

## 2023-08-09 RX ADMIN — IPRATROPIUM BROMIDE AND ALBUTEROL SULFATE 1 DOSE: .5; 2.5 SOLUTION RESPIRATORY (INHALATION) at 11:36

## 2023-08-09 RX ADMIN — FERROUS SULFATE TAB 325 MG (65 MG ELEMENTAL FE) 325 MG: 325 (65 FE) TAB at 08:28

## 2023-08-09 RX ADMIN — MUPIROCIN: 20 OINTMENT TOPICAL at 02:37

## 2023-08-09 RX ADMIN — TAMSULOSIN HYDROCHLORIDE 0.4 MG: 0.4 CAPSULE ORAL at 08:29

## 2023-08-09 RX ADMIN — IPRATROPIUM BROMIDE AND ALBUTEROL SULFATE 1 DOSE: .5; 2.5 SOLUTION RESPIRATORY (INHALATION) at 20:00

## 2023-08-09 RX ADMIN — Medication 100 MG: at 08:27

## 2023-08-09 RX ADMIN — Medication 400 MG: at 21:28

## 2023-08-09 RX ADMIN — ISOSORBIDE DINITRATE 10 MG: 10 TABLET ORAL at 13:43

## 2023-08-09 RX ADMIN — AMIODARONE HYDROCHLORIDE 400 MG: 200 TABLET ORAL at 08:27

## 2023-08-09 ASSESSMENT — PAIN DESCRIPTION - ORIENTATION
ORIENTATION: MID
ORIENTATION: RIGHT;LEFT

## 2023-08-09 ASSESSMENT — PAIN DESCRIPTION - LOCATION
LOCATION: CHEST
LOCATION: CHEST
LOCATION: HEAD
LOCATION: CHEST
LOCATION: CHEST;HEAD
LOCATION: CHEST
LOCATION: HEAD

## 2023-08-09 ASSESSMENT — PAIN SCALES - GENERAL
PAINLEVEL_OUTOF10: 5
PAINLEVEL_OUTOF10: 5
PAINLEVEL_OUTOF10: 2
PAINLEVEL_OUTOF10: 7
PAINLEVEL_OUTOF10: 7
PAINLEVEL_OUTOF10: 5
PAINLEVEL_OUTOF10: 5
PAINLEVEL_OUTOF10: 2
PAINLEVEL_OUTOF10: 2
PAINLEVEL_OUTOF10: 7
PAINLEVEL_OUTOF10: 5

## 2023-08-09 ASSESSMENT — PAIN DESCRIPTION - DESCRIPTORS
DESCRIPTORS: DISCOMFORT
DESCRIPTORS: SORE
DESCRIPTORS: THROBBING;TIGHTNESS;PRESSURE
DESCRIPTORS: SORE
DESCRIPTORS: DISCOMFORT;SORE;TIGHTNESS
DESCRIPTORS: THROBBING;TIGHTNESS;PRESSURE

## 2023-08-09 ASSESSMENT — PAIN - FUNCTIONAL ASSESSMENT: PAIN_FUNCTIONAL_ASSESSMENT: ACTIVITIES ARE NOT PREVENTED

## 2023-08-09 NOTE — PLAN OF CARE
Problem: Discharge Planning  Goal: Discharge to home or other facility with appropriate resources  8/9/2023 1005 by Radha Wilson RN  Outcome: Progressing  8/9/2023 0011 by Jaden Borrero RN  Outcome: Progressing     Problem: Pain  Goal: Verbalizes/displays adequate comfort level or baseline comfort level  8/9/2023 1005 by Radha Wilson RN  Outcome: Progressing  8/9/2023 0011 by Jaden Borrero RN  Outcome: Progressing     Problem: ABCDS Injury Assessment  Goal: Absence of physical injury  Outcome: Progressing     Problem: Safety - Adult  Goal: Free from fall injury  8/9/2023 1005 by Radha Wilson RN  Outcome: Progressing  8/9/2023 0011 by Jaden Borrero RN  Outcome: Progressing     Problem: Neurosensory - Adult  Goal: Achieves stable or improved neurological status  8/9/2023 0011 by Jaden Borrero RN  Outcome: Progressing  Goal: Achieves maximal functionality and self care  Outcome: Progressing     Problem: Respiratory - Adult  Goal: Achieves optimal ventilation and oxygenation  8/9/2023 1005 by Radha Wilson RN  Outcome: Progressing  8/9/2023 0011 by Jaden Borrero RN  Outcome: Progressing     Problem: Cardiovascular - Adult  Goal: Maintains optimal cardiac output and hemodynamic stability  8/9/2023 1005 by Radha Wilson RN  Outcome: Progressing  8/9/2023 0011 by Jaden Borrero RN  Outcome: Progressing     Problem: Skin/Tissue Integrity - Adult  Goal: Skin integrity remains intact  Outcome: Progressing     Problem: Metabolic/Fluid and Electrolytes - Adult  Goal: Electrolytes maintained within normal limits  Outcome: Progressing     Problem: Skin/Tissue Integrity  Goal: Absence of new skin breakdown  Description: 1. Monitor for areas of redness and/or skin breakdown  2. Assess vascular access sites hourly  3. Every 4-6 hours minimum:  Change oxygen saturation probe site  4.   Every 4-6 hours:  If on nasal continuous positive airway pressure, respiratory therapy assess nares and determine need for appliance change or

## 2023-08-09 NOTE — PLAN OF CARE
Pt was in bed when attempting to ambulate patient. He refused stated that he was too tired and needed to sleep. He said he would walk later with his wife. Nurse was also made aware and she stated that she would walk him later.

## 2023-08-09 NOTE — PATIENT CARE CONFERENCE
[facility] Quality Flow/Interdisciplinary Rounds Progress Note        Quality Flow Rounds held on August 9, 2023    Disciplines Attending:  Bedside Nurse, , , and Nursing Unit Leadership    Elda Scales was admitted on 8/3/2023 12:24 AM    Anticipated Discharge Date:       Disposition:    Nacho Score:  Nacho Scale Score: 20    Readmission Risk              Risk of Unplanned Readmission:  17           Discussed patient goal for the day, patient clinical progression, and barriers to discharge.   The following Goal(s) of the Day/Commitment(s) have been identified:  discharge [address] Elizabeth Quispe RN  August 9, 2023

## 2023-08-09 NOTE — ACP (ADVANCE CARE PLANNING)
Advance Care Planning   Healthcare Decision Maker:    Primary Decision Maker: Araceli Miller - [phone]    Secondary Decision Maker: Guerda Welsh - Child - [phone]    Click here to complete Healthcare Decision Makers including selection of the Healthcare Decision Maker Relationship (ie \"Primary\").

## 2023-08-09 NOTE — PLAN OF CARE
Problem: Discharge Planning  Goal: Discharge to home or other facility with appropriate resources  Outcome: Progressing     Problem: Pain  Goal: Verbalizes/displays adequate comfort level or baseline comfort level  8/9/2023 0011 by Bruce Morgan RN  Outcome: Progressing     Problem: Safety - Adult  Goal: Free from fall injury  8/9/2023 0011 by Bruce Morgan RN  Outcome: Progressing     Problem: Neurosensory - Adult  Goal: Achieves stable or improved neurological status  Outcome: Progressing     Problem: Respiratory - Adult  Goal: Achieves optimal ventilation and oxygenation  Outcome: Progressing     Problem: Cardiovascular - Adult  Goal: Maintains optimal cardiac output and hemodynamic stability  Outcome: Progressing

## 2023-08-09 NOTE — CARE COORDINATION
CM Update: Pt planning to discharge Home with [facility]. Referral called yesterday, soc set for Sat 8/12. Pt POD#2 CABG 8/7. 2 chest tubes remain to suction. He walked 150 ft no assistive device yesterday. Confirmed wife or daughter will be home with pt 24/7 first week home.  CM will continue to follow (TF)    Brendan Boswell [facility]  Case Management  [phone]

## 2023-08-10 VITALS
SYSTOLIC BLOOD PRESSURE: 122 MMHG | HEART RATE: 83 BPM | RESPIRATION RATE: 18 BRPM | BODY MASS INDEX: 31.89 KG/M2 | OXYGEN SATURATION: 94 % | HEIGHT: 64 IN | WEIGHT: 186.8 LBS | DIASTOLIC BLOOD PRESSURE: 71 MMHG | TEMPERATURE: 97.2 F

## 2023-08-10 LAB
ABO/RH: NORMAL
ALBUMIN SERPL-MCNC: 4.1 G/DL (ref 3.5–5.2)
ALP SERPL-CCNC: 114 U/L (ref 40–129)
ALT SERPL-CCNC: 44 U/L (ref 0–40)
ANION GAP SERPL CALCULATED.3IONS-SCNC: 10 MMOL/L (ref 7–16)
ANTIBODY SCREEN: NEGATIVE
ARM BAND NUMBER: NORMAL
AST SERPL-CCNC: 48 U/L (ref 0–39)
BILIRUB DIRECT SERPL-MCNC: 0.3 MG/DL (ref 0–0.3)
BILIRUB INDIRECT SERPL-MCNC: 0.6 MG/DL (ref 0–1)
BILIRUB SERPL-MCNC: 0.9 MG/DL (ref 0–1.2)
BLOOD BANK DISPENSE STATUS: NORMAL
BLOOD BANK SAMPLE EXPIRATION: NORMAL
BPU ID: NORMAL
BUN SERPL-MCNC: 21 MG/DL (ref 6–23)
CALCIUM SERPL-MCNC: 9.2 MG/DL (ref 8.6–10.2)
CHLORIDE SERPL-SCNC: 102 MMOL/L (ref 98–107)
CO2 SERPL-SCNC: 24 MMOL/L (ref 22–29)
COMPONENT: NORMAL
CREAT SERPL-MCNC: 0.7 MG/DL (ref 0.7–1.2)
CROSSMATCH RESULT: NORMAL
ERYTHROCYTE [DISTWIDTH] IN BLOOD BY AUTOMATED COUNT: 13.7 % (ref 11.5–15)
GFR SERPL CREATININE-BSD FRML MDRD: >60 ML/MIN/1.73M2
GLUCOSE BLD-MCNC: 128 MG/DL (ref 74–99)
GLUCOSE SERPL-MCNC: 124 MG/DL (ref 74–99)
HCT VFR BLD AUTO: 28.6 % (ref 37–54)
HGB BLD-MCNC: 9.2 G/DL (ref 12.5–16.5)
MAGNESIUM SERPL-MCNC: 2.2 MG/DL (ref 1.6–2.6)
MCH RBC QN AUTO: 30.2 PG (ref 26–35)
MCHC RBC AUTO-ENTMCNC: 32.2 G/DL (ref 32–34.5)
MCV RBC AUTO: 93.8 FL (ref 80–99.9)
METER GLUCOSE: 135 MG/DL (ref 74–99)
PLATELET # BLD AUTO: 174 K/UL (ref 130–450)
PMV BLD AUTO: 10.8 FL (ref 7–12)
POTASSIUM SERPL-SCNC: 3.9 MMOL/L (ref 3.5–5)
PROT SERPL-MCNC: 6.3 G/DL (ref 6.4–8.3)
RBC # BLD AUTO: 3.05 M/UL (ref 3.8–5.8)
SODIUM SERPL-SCNC: 136 MMOL/L (ref 132–146)
TRANSFUSION STATUS: NORMAL
UNIT DIVISION: 0
WBC OTHER # BLD: 8.4 K/UL (ref 4.5–11.5)

## 2023-08-10 PROCEDURE — 2580000003 HC RX 258: Performed by: NURSE PRACTITIONER

## 2023-08-10 PROCEDURE — 6360000002 HC RX W HCPCS: Performed by: NURSE PRACTITIONER

## 2023-08-10 PROCEDURE — 80053 COMPREHEN METABOLIC PANEL: CPT

## 2023-08-10 PROCEDURE — 2500000003 HC RX 250 WO HCPCS: Performed by: NURSE PRACTITIONER

## 2023-08-10 PROCEDURE — 36415 COLL VENOUS BLD VENIPUNCTURE: CPT

## 2023-08-10 PROCEDURE — 6370000000 HC RX 637 (ALT 250 FOR IP): Performed by: NURSE PRACTITIONER

## 2023-08-10 PROCEDURE — 85027 COMPLETE CBC AUTOMATED: CPT

## 2023-08-10 PROCEDURE — 82962 GLUCOSE BLOOD TEST: CPT

## 2023-08-10 PROCEDURE — 82248 BILIRUBIN DIRECT: CPT

## 2023-08-10 PROCEDURE — 94640 AIRWAY INHALATION TREATMENT: CPT

## 2023-08-10 PROCEDURE — 93798 PHYS/QHP OP CAR RHAB W/ECG: CPT

## 2023-08-10 PROCEDURE — 83735 ASSAY OF MAGNESIUM: CPT

## 2023-08-10 RX ORDER — ASCORBIC ACID 500 MG
500 TABLET ORAL 2 TIMES DAILY
Qty: 60 TABLET | Refills: 0 | Status: SHIPPED | OUTPATIENT
Start: 2023-08-10 | End: 2023-09-09

## 2023-08-10 RX ORDER — OXYCODONE HYDROCHLORIDE AND ACETAMINOPHEN 5; 325 MG/1; MG/1
1 TABLET ORAL EVERY 6 HOURS PRN
Qty: 28 TABLET | Refills: 0 | Status: SHIPPED | OUTPATIENT
Start: 2023-08-10 | End: 2023-08-17

## 2023-08-10 RX ORDER — DOCUSATE SODIUM 100 MG/1
100 CAPSULE, LIQUID FILLED ORAL 2 TIMES DAILY PRN
Qty: 40 CAPSULE | Refills: 0 | Status: SHIPPED | OUTPATIENT
Start: 2023-08-10 | End: 2023-09-09

## 2023-08-10 RX ORDER — CLOPIDOGREL BISULFATE 75 MG/1
75 TABLET ORAL DAILY
Qty: 60 TABLET | Refills: 0 | Status: SHIPPED | OUTPATIENT
Start: 2023-08-10 | End: 2023-10-09

## 2023-08-10 RX ORDER — ISOSORBIDE DINITRATE 10 MG/1
5 TABLET ORAL 3 TIMES DAILY
Status: DISCONTINUED | OUTPATIENT
Start: 2023-08-10 | End: 2023-08-10 | Stop reason: HOSPADM

## 2023-08-10 RX ORDER — LANOLIN ALCOHOL/MO/W.PET/CERES
100 CREAM (GRAM) TOPICAL DAILY
Qty: 30 TABLET | Refills: 0 | Status: SHIPPED | OUTPATIENT
Start: 2023-08-11 | End: 2023-09-10

## 2023-08-10 RX ORDER — LACTULOSE 10 G/15ML
20 SOLUTION ORAL 2 TIMES DAILY
Status: DISCONTINUED | OUTPATIENT
Start: 2023-08-10 | End: 2023-08-10 | Stop reason: HOSPADM

## 2023-08-10 RX ORDER — AMIODARONE HYDROCHLORIDE 200 MG/1
400 TABLET ORAL SEE ADMIN INSTRUCTIONS
Qty: 56 TABLET | Refills: 0 | Status: SHIPPED | OUTPATIENT
Start: 2023-08-10

## 2023-08-10 RX ORDER — FERROUS SULFATE 325(65) MG
325 TABLET ORAL 2 TIMES DAILY WITH MEALS
Qty: 60 TABLET | Refills: 0 | Status: SHIPPED | OUTPATIENT
Start: 2023-08-10 | End: 2023-09-09

## 2023-08-10 RX ORDER — ISOSORBIDE DINITRATE 5 MG/1
5 TABLET ORAL 3 TIMES DAILY
Qty: 90 TABLET | Refills: 10 | Status: SHIPPED | OUTPATIENT
Start: 2023-08-10

## 2023-08-10 RX ORDER — FOLIC ACID 1 MG/1
1 TABLET ORAL DAILY
Qty: 30 TABLET | Refills: 0 | Status: SHIPPED | OUTPATIENT
Start: 2023-08-11 | End: 2023-09-10

## 2023-08-10 RX ORDER — PANTOPRAZOLE SODIUM 40 MG/1
40 TABLET, DELAYED RELEASE ORAL
Qty: 14 TABLET | Refills: 0 | Status: SHIPPED | OUTPATIENT
Start: 2023-08-11 | End: 2023-08-25

## 2023-08-10 RX ADMIN — SODIUM CHLORIDE, PRESERVATIVE FREE 10 ML: 5 INJECTION INTRAVENOUS at 08:29

## 2023-08-10 RX ADMIN — HYDROMORPHONE HYDROCHLORIDE 0.25 MG: 1 INJECTION, SOLUTION INTRAMUSCULAR; INTRAVENOUS; SUBCUTANEOUS at 00:15

## 2023-08-10 RX ADMIN — AMIODARONE HYDROCHLORIDE 400 MG: 200 TABLET ORAL at 08:25

## 2023-08-10 RX ADMIN — PANTOPRAZOLE SODIUM 40 MG: 40 TABLET, DELAYED RELEASE ORAL at 05:53

## 2023-08-10 RX ADMIN — SENNOSIDES AND DOCUSATE SODIUM 1 TABLET: 50; 8.6 TABLET ORAL at 08:26

## 2023-08-10 RX ADMIN — PSYLLIUM HUSK 1 PACKET: 3.4 POWDER ORAL at 08:24

## 2023-08-10 RX ADMIN — OXYCODONE HYDROCHLORIDE 10 MG: 10 TABLET ORAL at 06:28

## 2023-08-10 RX ADMIN — IBUPROFEN 400 MG: 400 TABLET, FILM COATED ORAL at 00:14

## 2023-08-10 RX ADMIN — POTASSIUM CHLORIDE 20 MEQ: 1500 TABLET, EXTENDED RELEASE ORAL at 08:26

## 2023-08-10 RX ADMIN — Medication 400 MG: at 08:25

## 2023-08-10 RX ADMIN — ISOSORBIDE DINITRATE 5 MG: 10 TABLET ORAL at 14:33

## 2023-08-10 RX ADMIN — ASPIRIN 81 MG: 81 TABLET, COATED ORAL at 08:25

## 2023-08-10 RX ADMIN — FOLIC ACID 1 MG: 1 TABLET ORAL at 08:26

## 2023-08-10 RX ADMIN — BISACODYL 10 MG: 10 SUPPOSITORY RECTAL at 08:28

## 2023-08-10 RX ADMIN — Medication 4000 UNITS: at 08:25

## 2023-08-10 RX ADMIN — BISACODYL 5 MG: 5 TABLET, COATED ORAL at 08:26

## 2023-08-10 RX ADMIN — Medication 100 MG: at 08:25

## 2023-08-10 RX ADMIN — DIPHENHYDRAMINE HCL 5 ML: 12.5 LIQUID ORAL at 08:51

## 2023-08-10 RX ADMIN — METOCLOPRAMIDE HYDROCHLORIDE 10 MG: 5 INJECTION INTRAMUSCULAR; INTRAVENOUS at 05:53

## 2023-08-10 RX ADMIN — MUPIROCIN: 20 OINTMENT TOPICAL at 08:42

## 2023-08-10 RX ADMIN — ENOXAPARIN SODIUM 40 MG: 100 INJECTION SUBCUTANEOUS at 08:27

## 2023-08-10 RX ADMIN — METOPROLOL TARTRATE 12.5 MG: 25 TABLET, FILM COATED ORAL at 08:40

## 2023-08-10 RX ADMIN — IPRATROPIUM BROMIDE AND ALBUTEROL SULFATE 1 DOSE: .5; 2.5 SOLUTION RESPIRATORY (INHALATION) at 09:04

## 2023-08-10 RX ADMIN — IPRATROPIUM BROMIDE AND ALBUTEROL SULFATE 1 DOSE: .5; 2.5 SOLUTION RESPIRATORY (INHALATION) at 12:22

## 2023-08-10 RX ADMIN — FERROUS SULFATE TAB 325 MG (65 MG ELEMENTAL FE) 325 MG: 325 (65 FE) TAB at 08:25

## 2023-08-10 RX ADMIN — MULTIPLE VITAMINS W/ MINERALS TAB 1 TABLET: TAB at 08:26

## 2023-08-10 RX ADMIN — METOCLOPRAMIDE HYDROCHLORIDE 10 MG: 5 INJECTION INTRAMUSCULAR; INTRAVENOUS at 10:49

## 2023-08-10 RX ADMIN — FLUTICASONE PROPIONATE 1 SPRAY: 50 SPRAY, METERED NASAL at 10:49

## 2023-08-10 RX ADMIN — MAGNESIUM HYDROXIDE 30 ML: 1200 LIQUID ORAL at 08:24

## 2023-08-10 RX ADMIN — ISOSORBIDE DINITRATE 5 MG: 10 TABLET ORAL at 08:40

## 2023-08-10 RX ADMIN — Medication 500 MG: at 08:26

## 2023-08-10 RX ADMIN — LACTULOSE 20 G: 20 SOLUTION ORAL at 08:24

## 2023-08-10 RX ADMIN — ALLOPURINOL 100 MG: 100 TABLET ORAL at 08:26

## 2023-08-10 ASSESSMENT — PAIN DESCRIPTION - LOCATION
LOCATION: CHEST
LOCATION: CHEST;BACK

## 2023-08-10 ASSESSMENT — PAIN - FUNCTIONAL ASSESSMENT
PAIN_FUNCTIONAL_ASSESSMENT: ACTIVITIES ARE NOT PREVENTED
PAIN_FUNCTIONAL_ASSESSMENT: ACTIVITIES ARE NOT PREVENTED

## 2023-08-10 ASSESSMENT — PAIN SCALES - GENERAL
PAINLEVEL_OUTOF10: 2
PAINLEVEL_OUTOF10: 7
PAINLEVEL_OUTOF10: 8

## 2023-08-10 ASSESSMENT — PAIN DESCRIPTION - DESCRIPTORS
DESCRIPTORS: ACHING;DISCOMFORT;PRESSURE
DESCRIPTORS: DISCOMFORT;SORE;ACHING

## 2023-08-10 ASSESSMENT — PAIN DESCRIPTION - ORIENTATION
ORIENTATION: RIGHT
ORIENTATION: RIGHT;LEFT

## 2023-08-10 NOTE — CONSULTS
Met with patient and discussed that their physician has ordered a referral to our outpatient Phase II Cardiac Rehabilitation program. Reviewed the benefits of cardiac rehabilitation based on their diagnosis and personal risk factors. Patient demonstrates moderate interest in Cardiac Rehabilitation at this time. Cardiac Rehabilitation brochure provided to patient/family. The Cardiac Rehabilitation Program has been provided the patient's referral information and pertinent patient details and history. The patient may call [facility] [employer]  at [phone] for additional information or questions. Contact information for [address] [place] [address] and other choices close to the patient's residence have been provided in the discharge instructions so that the patient may call and schedule an appointment when cleared by their physician.  Thank you for the referral.

## 2023-08-10 NOTE — CARE COORDINATION
CM update note: Discharge order noted. Pt ambulated 400ft. On RA. Pt needs to have a BM. Flora Moss with [facility] notified of dc order on chart.

## 2023-08-10 NOTE — PLAN OF CARE
Problem: Cardiovascular - Adult  Goal: Maintains optimal cardiac output and hemodynamic stability  Outcome: Progressing     Problem: Respiratory - Adult  Goal: Achieves optimal ventilation and oxygenation  Outcome: Progressing     Problem: Pain  Goal: Verbalizes/displays adequate comfort level or baseline comfort level  Outcome: Progressing     Problem: Musculoskeletal - Adult  Goal: Return mobility to safest level of function  Outcome: Progressing     Problem: Discharge Planning  Goal: Discharge to home or other facility with appropriate resources  Outcome: Progressing

## 2023-08-10 NOTE — PLAN OF CARE
Problem: Discharge Planning  Goal: Discharge to home or other facility with appropriate resources  8/10/2023 1006 by Kinjal Wilson RN  Outcome: Progressing  8/10/2023 0028 by Atilio Silveira RN  Outcome: Progressing     Problem: Pain  Goal: Verbalizes/displays adequate comfort level or baseline comfort level  8/10/2023 1006 by Kinjal Wilson RN  Outcome: Progressing  8/10/2023 0028 by Atilio Silveira RN  Outcome: Progressing     Problem: ABCDS Injury Assessment  Goal: Absence of physical injury  8/10/2023 1006 by Kinjal Wilson RN  Outcome: Progressing  8/10/2023 0028 by Atilio Silveira RN  Outcome: Progressing     Problem: Safety - Adult  Goal: Free from fall injury  8/10/2023 1006 by Kinjal Wilson RN  Outcome: Progressing  8/10/2023 0028 by Atilio Silveira RN  Outcome: Progressing     Problem: Neurosensory - Adult  Goal: Achieves stable or improved neurological status  8/10/2023 1006 by Kinjal Wilson RN  Outcome: Progressing  8/10/2023 0028 by Atilio Silveira RN  Outcome: Progressing  Goal: Achieves maximal functionality and self care  8/10/2023 0028 by Atilio Silveira RN  Outcome: Progressing     Problem: Respiratory - Adult  Goal: Achieves optimal ventilation and oxygenation  8/10/2023 1006 by Kinjal Wilson RN  Outcome: Progressing  8/10/2023 0028 by Atilio Silveira RN  Outcome: Progressing     Problem: Cardiovascular - Adult  Goal: Maintains optimal cardiac output and hemodynamic stability  8/10/2023 0028 by Atilio Silveira RN  Outcome: Progressing     Problem: Skin/Tissue Integrity - Adult  Goal: Skin integrity remains intact  8/10/2023 1006 by Kinjal Wilson RN  Outcome: Progressing  8/10/2023 0028 by Atilio Silveira RN  Outcome: Progressing  Goal: Incisions, wounds, or drain sites healing without S/S of infection  8/10/2023 0028 by Atilio Silveira RN  Outcome: Progressing  Goal: Oral mucous membranes remain intact  8/10/2023 0028 by Atilio Silveira RN  Outcome: Progressing     Problem:

## 2023-08-14 ENCOUNTER — TELEPHONE (OUTPATIENT)
Dept: ADMINISTRATIVE | Age: 68
End: 2023-08-14

## 2023-08-14 NOTE — TELEPHONE ENCOUNTER
Spoke with Dr. Adriel Freed office. Patient is Dr. Adriel Freed cousin. Will forward to Dr. Jw Arce for review.

## 2023-08-14 NOTE — TELEPHONE ENCOUNTER
Pt called to schedule hfu wi Dr. Tierra Vera. Pt was admitted to [facility] on 8/3/23 for elevated triponin, back pain, positive NSTEMI. Pt was dc'd on 8/10/23 and advised to f/u with Dr. Tierra Vera. Pt had open heart surgery on 8/1/23 with Dr. Grant Rubio.   Pt requests to f/u with Dr. Carie Saint

## 2023-08-24 ENCOUNTER — HOSPITAL ENCOUNTER (OUTPATIENT)
Dept: CARDIAC REHAB | Age: 68
Setting detail: THERAPIES SERIES
Discharge: HOME | End: 2023-08-24

## 2023-09-01 ENCOUNTER — HOSPITAL ENCOUNTER (OUTPATIENT)
Dept: CARDIAC REHAB | Age: 68
Setting detail: THERAPIES SERIES
Discharge: HOME | End: 2023-09-01

## 2023-09-02 PROBLEM — R77.8 ELEVATED TROPONIN: Status: RESOLVED | Noted: 2023-08-03 | Resolved: 2023-09-02

## 2023-09-02 PROBLEM — R79.89 ELEVATED TROPONIN: Status: RESOLVED | Noted: 2023-08-03 | Resolved: 2023-09-02

## 2023-09-06 ENCOUNTER — HOSPITAL ENCOUNTER (OUTPATIENT)
Dept: CARDIAC REHAB | Age: 68
Setting detail: THERAPIES SERIES
Discharge: HOME | End: 2023-09-06

## 2023-09-07 ENCOUNTER — HOSPITAL ENCOUNTER (OUTPATIENT)
Dept: CARDIAC REHAB | Age: 68
Setting detail: THERAPIES SERIES
Discharge: HOME | End: 2023-09-07

## 2023-09-08 ENCOUNTER — HOSPITAL ENCOUNTER (OUTPATIENT)
Dept: CARDIAC REHAB | Age: 68
Setting detail: THERAPIES SERIES
Discharge: HOME | End: 2023-09-08

## 2023-09-19 ASSESSMENT — ENCOUNTER SYMPTOMS
SHORTNESS OF BREATH: 0
ABDOMINAL PAIN: 0
CONSTIPATION: 0
COUGH: 0

## 2023-09-19 NOTE — PROGRESS NOTES
Subjective:      Patient ID: Kelly Ott is a 70 y.o. male. CC: surgery follow up-  follow up delayed 2 weeks due to patient sukhdev COVID post discharge    HPI He presents for routine follow up s/p:  OPERATIONS:  1. Urgent sternotomy. 2.  Excision of left internal mammary artery lymph node. 3.  Urgent coronary artery bypass grafting x3, left internal mammary  artery to LAD, saphenous vein graft to the obtuse marginal, saphenous  vein graft to the PDA. 4.  Right and left cryo and bipolar maze procedure. 5.  Left atrial appendage exclusion with a 35-mm AtriClip. 6.  Endoscopic harvesting of right lower extremity greater saphenous  vein. 7.  Rigid internal fixation of the sternum with [place] plates x2. He did well after surgery and was sent home on POD 3. He offers no major complaints today in office. Denies incision issues or sternal instability. Review of Systems   Constitutional:  Negative for chills and fever. Respiratory:  Negative for cough and shortness of breath. Cardiovascular:  Negative for chest pain and palpitations. Gastrointestinal:  Negative for abdominal pain and constipation. Neurological:  Negative for syncope and light-headedness. Objective:   Physical Exam  Constitutional:       General: He is not in acute distress. Cardiovascular:      Rate and Rhythm: Normal rate and regular rhythm. Pulmonary:      Effort: Pulmonary effort is normal.      Breath sounds: Normal breath sounds. Comments: midsternal and chest tube incisions well healed without evidence of infection. Sternum stable. Abdominal:      Palpations: Abdomen is soft. Tenderness: There is no guarding. Skin:     General: Skin is warm and dry. Comments: Endoscopic vein harvest incisions intact without evidence of infection     Neurological:      General: No focal deficit present. Mental Status: He is alert and oriented to person, place, and time.    Psychiatric:

## 2023-09-26 ENCOUNTER — OFFICE VISIT (OUTPATIENT)
Dept: CARDIOTHORACIC SURGERY | Age: 68
End: 2023-09-26

## 2023-09-26 VITALS
HEART RATE: 83 BPM | HEIGHT: 64 IN | SYSTOLIC BLOOD PRESSURE: 100 MMHG | WEIGHT: 170 LBS | DIASTOLIC BLOOD PRESSURE: 65 MMHG | BODY MASS INDEX: 29.02 KG/M2

## 2023-09-26 DIAGNOSIS — Z95.1 S/P CABG (CORONARY ARTERY BYPASS GRAFT): Primary | ICD-10-CM

## 2023-09-26 PROCEDURE — 99024 POSTOP FOLLOW-UP VISIT: CPT | Performed by: THORACIC SURGERY (CARDIOTHORACIC VASCULAR SURGERY)

## 2023-09-27 ENCOUNTER — TELEPHONE (OUTPATIENT)
Dept: CARDIOTHORACIC SURGERY | Age: 68
End: 2023-09-27

## 2023-10-16 ENCOUNTER — HOSPITAL ENCOUNTER (OUTPATIENT)
Dept: CARDIAC REHAB | Age: 68
Discharge: HOME | End: 2023-10-16

## 2023-10-16 VITALS — WEIGHT: 174.6 LBS | BODY MASS INDEX: 29.09 KG/M2 | HEIGHT: 65 IN | RESPIRATION RATE: 20 BRPM | OXYGEN SATURATION: 98 %

## 2023-10-16 ASSESSMENT — PATIENT HEALTH QUESTIONNAIRE - PHQ9
SUM OF ALL RESPONSES TO PHQ QUESTIONS 1-9: 0
SUM OF ALL RESPONSES TO PHQ9 QUESTIONS 1 & 2: 0
10. IF YOU CHECKED OFF ANY PROBLEMS, HOW DIFFICULT HAVE THESE PROBLEMS MADE IT FOR YOU TO DO YOUR WORK, TAKE CARE OF THINGS AT HOME, OR GET ALONG WITH OTHER PEOPLE: 0
SUM OF ALL RESPONSES TO PHQ QUESTIONS 1-9: 0
3. TROUBLE FALLING OR STAYING ASLEEP: 0
6. FEELING BAD ABOUT YOURSELF - OR THAT YOU ARE A FAILURE OR HAVE LET YOURSELF OR YOUR FAMILY DOWN: 0
4. FEELING TIRED OR HAVING LITTLE ENERGY: 0
7. TROUBLE CONCENTRATING ON THINGS, SUCH AS READING THE NEWSPAPER OR WATCHING TELEVISION: 0
9. THOUGHTS THAT YOU WOULD BE BETTER OFF DEAD, OR OF HURTING YOURSELF: 0
8. MOVING OR SPEAKING SO SLOWLY THAT OTHER PEOPLE COULD HAVE NOTICED. OR THE OPPOSITE, BEING SO FIGETY OR RESTLESS THAT YOU HAVE BEEN MOVING AROUND A LOT MORE THAN USUAL: 0
SUM OF ALL RESPONSES TO PHQ QUESTIONS 1-9: 0
5. POOR APPETITE OR OVEREATING: 0
1. LITTLE INTEREST OR PLEASURE IN DOING THINGS: 0
2. FEELING DOWN, DEPRESSED OR HOPELESS: 0
SUM OF ALL RESPONSES TO PHQ QUESTIONS 1-9: 0

## 2023-10-16 ASSESSMENT — EJECTION FRACTION: EF_VALUE: 70

## 2023-10-16 NOTE — CARDIO/PULMONARY
PT. SEEN IN CARDIAC REHAB TODAY FOR INITIAL EVALUATION AND EXERCISE. PT. S/P NSTEMI ON 8-3-23 AND CABG X3 WITH MAZE PROCEDURE ON 8-7-23. STERNAL AND RIGHT LEG INCISIONS ARE HEALING WELL WITH NO EVIDENCE OF INFECTION. PT. HAS A XH OF BACK SURGERY /PAIN, AFIB, ARTHRITIS IN HIS HANDS ,TESTICULAR CA, AND A BOWEL RESECTION. NO SWELLING OF HIS FEET Isa Elizabeth. PHQ9 SCORE IS A 0. DENIES S/S OF DEPRESSION. FALLS RISK IS A 1/8. LOW RISK. HE STATES HE DOES NOT HAVE ANY EXERCISE OR EQUIPMENT LIMITATIONS. PT. STATES HE DOES WALK AT HOME 1/2-1 MILE TWICE A WEEK.  PT.'S GOAL AT CARDIAC REHAB IS TO IMPROVE HIS STRENGTH AND STAMINA THROUGH EXERCISE AT CARDIAC REHAB.

## 2023-12-13 ENCOUNTER — OFFICE VISIT (OUTPATIENT)
Dept: CARDIOLOGY CLINIC | Age: 68
End: 2023-12-13
Payer: MEDICARE

## 2023-12-13 VITALS
BODY MASS INDEX: 31.21 KG/M2 | SYSTOLIC BLOOD PRESSURE: 110 MMHG | WEIGHT: 187.3 LBS | HEART RATE: 86 BPM | DIASTOLIC BLOOD PRESSURE: 70 MMHG | RESPIRATION RATE: 16 BRPM | HEIGHT: 65 IN

## 2023-12-13 DIAGNOSIS — I25.10 CORONARY ARTERY DISEASE DUE TO LIPID RICH PLAQUE: ICD-10-CM

## 2023-12-13 DIAGNOSIS — I10 ESSENTIAL HYPERTENSION: Primary | ICD-10-CM

## 2023-12-13 DIAGNOSIS — I25.83 CORONARY ARTERY DISEASE DUE TO LIPID RICH PLAQUE: ICD-10-CM

## 2023-12-13 DIAGNOSIS — Z09 HOSPITAL DISCHARGE FOLLOW-UP: ICD-10-CM

## 2023-12-13 DIAGNOSIS — I48.0 PAROXYSMAL ATRIAL FIBRILLATION (HCC): Chronic | ICD-10-CM

## 2023-12-13 PROCEDURE — 3017F COLORECTAL CA SCREEN DOC REV: CPT | Performed by: INTERNAL MEDICINE

## 2023-12-13 PROCEDURE — 1123F ACP DISCUSS/DSCN MKR DOCD: CPT | Performed by: INTERNAL MEDICINE

## 2023-12-13 PROCEDURE — G8484 FLU IMMUNIZE NO ADMIN: HCPCS | Performed by: INTERNAL MEDICINE

## 2023-12-13 PROCEDURE — 1036F TOBACCO NON-USER: CPT | Performed by: INTERNAL MEDICINE

## 2023-12-13 PROCEDURE — 3078F DIAST BP <80 MM HG: CPT | Performed by: INTERNAL MEDICINE

## 2023-12-13 PROCEDURE — G8417 CALC BMI ABV UP PARAM F/U: HCPCS | Performed by: INTERNAL MEDICINE

## 2023-12-13 PROCEDURE — 3074F SYST BP LT 130 MM HG: CPT | Performed by: INTERNAL MEDICINE

## 2023-12-13 PROCEDURE — 1111F DSCHRG MED/CURRENT MED MERGE: CPT | Performed by: INTERNAL MEDICINE

## 2023-12-13 PROCEDURE — 93000 ELECTROCARDIOGRAM COMPLETE: CPT | Performed by: INTERNAL MEDICINE

## 2023-12-13 PROCEDURE — G8427 DOCREV CUR MEDS BY ELIG CLIN: HCPCS | Performed by: INTERNAL MEDICINE

## 2023-12-13 PROCEDURE — 99214 OFFICE O/P EST MOD 30 MIN: CPT | Performed by: INTERNAL MEDICINE

## 2023-12-13 NOTE — PROGRESS NOTES
meals)      atorvastatin (LIPITOR) 40 MG tablet TAKE 1 TABLET BY MOUTH EVERY DAY 90 tablet 1    allopurinol (ZYLOPRIM) 100 MG tablet TAKE 1 TABLET BY MOUTH EVERY DAY      Blood Pressure KIT Check BP  Once daily 1 kit 0    magnesium gluconate (MAGONATE) 500 MG tablet Take 1 tablet by mouth daily      VASCEPA 1 G CAPS capsule TAKE TWO CAPSULES TWO TIMES A DAY  5    vitamin D (CHOLECALCIFEROL) 1000 UNIT TABS tablet Take 4 tablets by mouth daily      Multiple Vitamins-Minerals (THERAPEUTIC MULTIVITAMIN-MINERALS) tablet Take 1 tablet by mouth daily       No current facility-administered medications for this visit. No Known Allergies    Chief Complaint:  Ian Pike is here today for follow up and management/recomendations for coronary artery disease    History of Present Illness: Ian Pike is being seen today for initial hospital follow-up. He was previously followed by Dr. Michael Zaldivar. I am assuming his cardiac care today. States that He does household chores, goes up the stairs, does yard work & goes shopping. He did start cardiac rehab but this got interrupted. He plans on resuming this. He has been walking on a treadmill for 1 mile. He denies any chest discomfort, dyspnea on exertion, orthopnea/PND, or lower extremity edema. He denies any palpitations or presyncopal symptoms. REVIEW OF SYSTEMS:  As above. Patient does not complain of any fever, chills, nausea, vomiting or diarrhea. No focal, motor or neurological deficits. No changes in his/her vision, hearing, bowel or bladder habits. He is not known to have a history of thyroid problems. No recent nose bleeds. PHYSICAL EXAM:  Vitals:    12/13/23 0742   BP: 110/70   Pulse: 86   Resp: 16   Weight: 85 kg (187 lb 4.8 oz)   Height: 1.651 m (5' 5\")       GENERAL:  He is alert and oriented x 3, communicates well, in no distress. NECK:  No masses, trachea is mid position. Supple, full ROM, no JVD or bruits.   No palpable thyromegaly

## 2024-01-29 ENCOUNTER — HOSPITAL ENCOUNTER (OUTPATIENT)
Dept: CARDIAC REHAB | Age: 69
Discharge: HOME | End: 2024-01-29

## 2024-04-08 ENCOUNTER — TELEPHONE (OUTPATIENT)
Dept: CARDIOLOGY CLINIC | Age: 69
End: 2024-04-08

## 2024-04-08 NOTE — TELEPHONE ENCOUNTER
Patient called stating he has been having some hemorrhoid problems and Dr. Kerns wants to band the hemorrhoids.  He has been recommended to hold Plavix (7) days prior.    Per verbal from Dr. Soto, CTS should give those recommendations as patient had bypass surgery.  Patient notified.

## 2024-04-10 ENCOUNTER — TELEPHONE (OUTPATIENT)
Dept: CARDIOTHORACIC SURGERY | Age: 69
End: 2024-04-10

## 2024-04-10 NOTE — TELEPHONE ENCOUNTER
Pt is having hemorrhoid surgery wants to know if he can hold Plavix. He called [facility] office they advised him to call us. Please advise.

## 2024-04-10 NOTE — TELEPHONE ENCOUNTER
Patient does not need to continue Plavix from our standpoint, however please make sure he continues Isordil for at least 1 year post-operatively.

## 2024-04-10 NOTE — TELEPHONE ENCOUNTER
The pt wants to know if he is even supposed to be on the Plavix still? Apparently his PCP just continued it when his script . Please advise

## 2024-05-30 ENCOUNTER — HOSPITAL ENCOUNTER (OUTPATIENT)
Age: 69
Discharge: HOME | End: 2024-06-01

## 2024-06-07 LAB — SURGICAL PATHOLOGY REPORT: NORMAL

## 2024-06-17 ENCOUNTER — OFFICE VISIT (OUTPATIENT)
Dept: CARDIOLOGY CLINIC | Age: 69
End: 2024-06-17
Payer: MEDICARE

## 2024-06-17 VITALS
DIASTOLIC BLOOD PRESSURE: 80 MMHG | RESPIRATION RATE: 17 BRPM | HEART RATE: 60 BPM | BODY MASS INDEX: 29.59 KG/M2 | SYSTOLIC BLOOD PRESSURE: 130 MMHG | HEIGHT: 65 IN | WEIGHT: 177.6 LBS

## 2024-06-17 DIAGNOSIS — I25.10 CORONARY ARTERY DISEASE DUE TO LIPID RICH PLAQUE: Primary | ICD-10-CM

## 2024-06-17 DIAGNOSIS — I25.83 CORONARY ARTERY DISEASE DUE TO LIPID RICH PLAQUE: Primary | ICD-10-CM

## 2024-06-17 PROCEDURE — 3079F DIAST BP 80-89 MM HG: CPT | Performed by: INTERNAL MEDICINE

## 2024-06-17 PROCEDURE — G8417 CALC BMI ABV UP PARAM F/U: HCPCS | Performed by: INTERNAL MEDICINE

## 2024-06-17 PROCEDURE — G8427 DOCREV CUR MEDS BY ELIG CLIN: HCPCS | Performed by: INTERNAL MEDICINE

## 2024-06-17 PROCEDURE — 3017F COLORECTAL CA SCREEN DOC REV: CPT | Performed by: INTERNAL MEDICINE

## 2024-06-17 PROCEDURE — 3075F SYST BP GE 130 - 139MM HG: CPT | Performed by: INTERNAL MEDICINE

## 2024-06-17 PROCEDURE — 1036F TOBACCO NON-USER: CPT | Performed by: INTERNAL MEDICINE

## 2024-06-17 PROCEDURE — 1123F ACP DISCUSS/DSCN MKR DOCD: CPT | Performed by: INTERNAL MEDICINE

## 2024-06-17 PROCEDURE — 99214 OFFICE O/P EST MOD 30 MIN: CPT | Performed by: INTERNAL MEDICINE

## 2024-06-17 RX ORDER — ASPIRIN 81 MG/1
81 TABLET ORAL DAILY
COMMUNITY

## 2024-06-17 NOTE — PROGRESS NOTES
Lee Mckeon  1955  Date of Service: 6/17/2024    Patient Active Problem List    Diagnosis Date Noted    Acute postoperative respiratory insufficiency 08/07/2023    Postoperative hypotension 08/07/2023    Acute post-operative pain 08/07/2023    NSTEMI (non-ST elevated myocardial infarction) (HCC) 08/03/2023    Pre-diabetes 08/03/2023    Acute left-sided thoracic back pain 08/03/2023    CAD (coronary artery disease) 08/02/2023     Overview Note:     CABG 8/7/2023  LIMA-LAD, SVG-OM, SVG-PDA      Acute respiratory failure with hypoxia (HCC) 08/30/2021    Primary osteoarthritis of left knee 01/12/2021    Chest pain 02/22/2020    Essential hypertension 02/22/2020    Hyperlipidemia LDL goal <100 02/22/2020    Paroxysmal atrial fibrillation (HCC) 02/22/2020     Overview Note:     Periprocedural colonoscopy in 2017.  Evaluated by Dr. Baron.  Monitor performed with no recurrences.  Never started on any anticoagulation.  Now status post Maze procedure and left atrial appendage clip with atri clip.         Social History     Socioeconomic History    Marital status:      Spouse name: None    Number of children: None    Years of education: None    Highest education level: None   Tobacco Use    Smoking status: Former     Types: Cigars     Start date: 1/1/1980    Smokeless tobacco: Former   Substance and Sexual Activity    Alcohol use: Yes    Drug use: No       Current Outpatient Medications   Medication Sig Dispense Refill    isosorbide dinitrate (ISORDIL) 5 MG tablet Take 1 tablet by mouth 3 times daily Take for ONE YEAR following surgery 90 tablet 10    metoprolol tartrate (LOPRESSOR) 25 MG tablet Take 0.5 tablets by mouth 2 times daily 60 tablet 3    ascorbic acid (VITAMIN C) 500 MG tablet Take 1 tablet by mouth 2 times daily 60 tablet 0    gemfibrozil (LOPID) 600 MG tablet Take 1 tablet by mouth 2 times daily (before meals)      atorvastatin (LIPITOR) 40 MG tablet TAKE 1 TABLET BY MOUTH EVERY DAY

## 2025-06-12 ENCOUNTER — OFFICE VISIT (OUTPATIENT)
Dept: CARDIOLOGY CLINIC | Age: 70
End: 2025-06-12
Payer: MEDICARE

## 2025-06-12 VITALS
SYSTOLIC BLOOD PRESSURE: 136 MMHG | OXYGEN SATURATION: 93 % | DIASTOLIC BLOOD PRESSURE: 82 MMHG | HEIGHT: 66 IN | WEIGHT: 176.8 LBS | BODY MASS INDEX: 28.42 KG/M2 | HEART RATE: 78 BPM | TEMPERATURE: 96.9 F | RESPIRATION RATE: 18 BRPM

## 2025-06-12 DIAGNOSIS — I25.10 CORONARY ARTERY DISEASE DUE TO LIPID RICH PLAQUE: Primary | ICD-10-CM

## 2025-06-12 DIAGNOSIS — I25.83 CORONARY ARTERY DISEASE DUE TO LIPID RICH PLAQUE: Primary | ICD-10-CM

## 2025-06-12 DIAGNOSIS — I10 ESSENTIAL HYPERTENSION: ICD-10-CM

## 2025-06-12 DIAGNOSIS — I48.0 PAROXYSMAL ATRIAL FIBRILLATION (HCC): Chronic | ICD-10-CM

## 2025-06-12 PROCEDURE — 1159F MED LIST DOCD IN RCRD: CPT | Performed by: INTERNAL MEDICINE

## 2025-06-12 PROCEDURE — 3075F SYST BP GE 130 - 139MM HG: CPT | Performed by: INTERNAL MEDICINE

## 2025-06-12 PROCEDURE — 99214 OFFICE O/P EST MOD 30 MIN: CPT | Performed by: INTERNAL MEDICINE

## 2025-06-12 PROCEDURE — 3079F DIAST BP 80-89 MM HG: CPT | Performed by: INTERNAL MEDICINE

## 2025-06-12 PROCEDURE — 93000 ELECTROCARDIOGRAM COMPLETE: CPT | Performed by: INTERNAL MEDICINE

## 2025-06-12 PROCEDURE — 1123F ACP DISCUSS/DSCN MKR DOCD: CPT | Performed by: INTERNAL MEDICINE

## 2025-06-12 PROCEDURE — G2211 COMPLEX E/M VISIT ADD ON: HCPCS | Performed by: INTERNAL MEDICINE

## 2025-06-12 RX ORDER — LANOLIN ALCOHOL/MO/W.PET/CERES
100 CREAM (GRAM) TOPICAL DAILY
COMMUNITY

## 2025-06-12 RX ORDER — METOPROLOL SUCCINATE 50 MG/1
50 TABLET, EXTENDED RELEASE ORAL DAILY
Qty: 90 TABLET | Refills: 3 | Status: SHIPPED | OUTPATIENT
Start: 2025-06-12

## 2025-06-12 NOTE — PROGRESS NOTES
Lee Mckeon  1955  Date of Service: 6/12/2025    Patient Active Problem List    Diagnosis Date Noted    Acute postoperative respiratory insufficiency 08/07/2023    Postoperative hypotension 08/07/2023    Acute post-operative pain 08/07/2023    NSTEMI (non-ST elevated myocardial infarction) (HCC) 08/03/2023    Pre-diabetes 08/03/2023    Acute left-sided thoracic back pain 08/03/2023    CAD (coronary artery disease) 08/02/2023     Overview Note:     CABG 8/7/2023  LIMA-LAD, SVG-OM, SVG-PDA      Acute respiratory failure with hypoxia (HCC) 08/30/2021    Primary osteoarthritis of left knee 01/12/2021    Chest pain 02/22/2020    Essential hypertension 02/22/2020    Hyperlipidemia LDL goal <100 02/22/2020    Paroxysmal atrial fibrillation (HCC) 02/22/2020     Overview Note:     Periprocedural colonoscopy in 2017.  Evaluated by Dr. Baron.  Monitor performed with no recurrences.  Never started on any anticoagulation.  Now status post Maze procedure and left atrial appendage clip with atri clip.         Social History     Socioeconomic History    Marital status:      Spouse name: None    Number of children: None    Years of education: None    Highest education level: None   Tobacco Use    Smoking status: Former     Types: Cigars     Start date: 1/1/1980    Smokeless tobacco: Former   Substance and Sexual Activity    Alcohol use: Yes    Drug use: No       Current Outpatient Medications   Medication Sig Dispense Refill    thiamine 100 MG tablet Take 1 tablet by mouth daily      aspirin 81 MG EC tablet Take 1 tablet by mouth daily      isosorbide dinitrate (ISORDIL) 5 MG tablet Take 1 tablet by mouth 3 times daily Take for ONE YEAR following surgery (Patient taking differently: Take 1 tablet by mouth 3 times daily Indications: 2.5mg tid Take for ONE YEAR following surgery) 90 tablet 10    metoprolol tartrate (LOPRESSOR) 25 MG tablet Take 0.5 tablets by mouth 2 times daily 60 tablet 3    gemfibrozil

## 2025-08-09 LAB
CRYSTALS FLD MICRO: ABNORMAL
PATH INTERP FLD-IMP: ABNORMAL
SPECIMEN TYPE: ABNORMAL

## 2025-08-10 LAB
MICROORGANISM SPEC CULT: NORMAL
MICROORGANISM/AGENT SPEC: NORMAL
SERVICE CMNT-IMP: NORMAL
SPECIMEN DESCRIPTION: NORMAL

## 2025-08-13 ENCOUNTER — TELEPHONE (OUTPATIENT)
Dept: CARDIOLOGY CLINIC | Age: 70
End: 2025-08-13

## 2025-08-15 ENCOUNTER — HOSPITAL ENCOUNTER (OUTPATIENT)
Age: 70
Setting detail: OBSERVATION
Discharge: HOME | End: 2025-08-16
Admitting: INTERNAL MEDICINE
Payer: MEDICARE

## 2025-08-15 ENCOUNTER — APPOINTMENT (OUTPATIENT)
Dept: GENERAL RADIOLOGY | Age: 70
End: 2025-08-15
Payer: MEDICARE

## 2025-08-15 DIAGNOSIS — R07.9 ACUTE CHEST PAIN: Primary | ICD-10-CM

## 2025-08-15 LAB
ALBUMIN SERPL-MCNC: 4.1 G/DL (ref 3.5–5.2)
ALP SERPL-CCNC: 89 U/L (ref 40–129)
ALT SERPL-CCNC: 15 U/L (ref 0–50)
ANION GAP SERPL CALCULATED.3IONS-SCNC: 13 MMOL/L (ref 7–16)
AST SERPL-CCNC: 25 U/L (ref 0–50)
BASOPHILS # BLD: 0.08 K/UL (ref 0–0.2)
BASOPHILS NFR BLD: 2 % (ref 0–2)
BILIRUB SERPL-MCNC: 0.6 MG/DL (ref 0–1.2)
BUN SERPL-MCNC: 14 MG/DL (ref 8–23)
CALCIUM SERPL-MCNC: 9.7 MG/DL (ref 8.8–10.2)
CHLORIDE SERPL-SCNC: 109 MMOL/L (ref 98–107)
CO2 SERPL-SCNC: 21 MMOL/L (ref 22–29)
CREAT SERPL-MCNC: 0.9 MG/DL (ref 0.7–1.2)
EKG ATRIAL RATE: 73 BPM
EKG P AXIS: 58 DEGREES
EKG P-R INTERVAL: 194 MS
EKG Q-T INTERVAL: 382 MS
EKG QRS DURATION: 90 MS
EKG QTC CALCULATION (BAZETT): 420 MS
EKG R AXIS: -1 DEGREES
EKG T AXIS: 43 DEGREES
EKG VENTRICULAR RATE: 73 BPM
EOSINOPHIL # BLD: 0.19 K/UL (ref 0.05–0.5)
EOSINOPHILS RELATIVE PERCENT: 4 % (ref 0–6)
ERYTHROCYTE [DISTWIDTH] IN BLOOD BY AUTOMATED COUNT: 12.3 % (ref 11.5–15)
GFR, ESTIMATED: >90 ML/MIN/1.73M2
GLUCOSE SERPL-MCNC: 169 MG/DL (ref 74–99)
HCT VFR BLD AUTO: 46.7 % (ref 37–54)
HGB BLD-MCNC: 15.2 G/DL (ref 12.5–16.5)
IMM GRANULOCYTES # BLD AUTO: <0.03 K/UL (ref 0–0.58)
IMM GRANULOCYTES NFR BLD: 0 % (ref 0–5)
LYMPHOCYTES NFR BLD: 1.64 K/UL (ref 1.5–4)
LYMPHOCYTES RELATIVE PERCENT: 36 % (ref 20–42)
MCH RBC QN AUTO: 29.2 PG (ref 26–35)
MCHC RBC AUTO-ENTMCNC: 32.5 G/DL (ref 32–34.5)
MCV RBC AUTO: 89.8 FL (ref 80–99.9)
MONOCYTES NFR BLD: 0.55 K/UL (ref 0.1–0.95)
MONOCYTES NFR BLD: 12 % (ref 2–12)
NEUTROPHILS NFR BLD: 46 % (ref 43–80)
NEUTS SEG NFR BLD: 2.09 K/UL (ref 1.8–7.3)
PLATELET # BLD AUTO: 318 K/UL (ref 130–450)
PMV BLD AUTO: 9.9 FL (ref 7–12)
POTASSIUM SERPL-SCNC: 4 MMOL/L (ref 3.5–5.1)
PROT SERPL-MCNC: 7.2 G/DL (ref 6.4–8.3)
RBC # BLD AUTO: 5.2 M/UL (ref 3.8–5.8)
SODIUM SERPL-SCNC: 144 MMOL/L (ref 136–145)
TROPONIN I SERPL HS-MCNC: 12 NG/L (ref 0–22)
TROPONIN I SERPL HS-MCNC: 16 NG/L (ref 0–22)
WBC OTHER # BLD: 4.6 K/UL (ref 4.5–11.5)

## 2025-08-15 PROCEDURE — G0378 HOSPITAL OBSERVATION PER HR: HCPCS

## 2025-08-15 PROCEDURE — 84484 ASSAY OF TROPONIN QUANT: CPT

## 2025-08-15 PROCEDURE — 93005 ELECTROCARDIOGRAM TRACING: CPT

## 2025-08-15 PROCEDURE — 85025 COMPLETE CBC W/AUTO DIFF WBC: CPT

## 2025-08-15 PROCEDURE — 6370000000 HC RX 637 (ALT 250 FOR IP)

## 2025-08-15 PROCEDURE — 6360000002 HC RX W HCPCS: Performed by: NURSE PRACTITIONER

## 2025-08-15 PROCEDURE — 96372 THER/PROPH/DIAG INJ SC/IM: CPT

## 2025-08-15 PROCEDURE — 6370000000 HC RX 637 (ALT 250 FOR IP): Performed by: NURSE PRACTITIONER

## 2025-08-15 PROCEDURE — 71045 X-RAY EXAM CHEST 1 VIEW: CPT

## 2025-08-15 PROCEDURE — 93010 ELECTROCARDIOGRAM REPORT: CPT | Performed by: INTERNAL MEDICINE

## 2025-08-15 PROCEDURE — 2500000003 HC RX 250 WO HCPCS: Performed by: NURSE PRACTITIONER

## 2025-08-15 PROCEDURE — 99285 EMERGENCY DEPT VISIT HI MDM: CPT

## 2025-08-15 PROCEDURE — 80053 COMPREHEN METABOLIC PANEL: CPT

## 2025-08-15 RX ORDER — MAGNESIUM SULFATE IN WATER 40 MG/ML
2000 INJECTION, SOLUTION INTRAVENOUS PRN
Status: DISCONTINUED | OUTPATIENT
Start: 2025-08-15 | End: 2025-08-16

## 2025-08-15 RX ORDER — POTASSIUM CHLORIDE 7.45 MG/ML
10 INJECTION INTRAVENOUS PRN
Status: DISCONTINUED | OUTPATIENT
Start: 2025-08-15 | End: 2025-08-16

## 2025-08-15 RX ORDER — ACETAMINOPHEN 650 MG/1
650 SUPPOSITORY RECTAL EVERY 6 HOURS PRN
Status: DISCONTINUED | OUTPATIENT
Start: 2025-08-15 | End: 2025-08-16 | Stop reason: HOSPADM

## 2025-08-15 RX ORDER — ONDANSETRON 4 MG/1
4 TABLET, ORALLY DISINTEGRATING ORAL EVERY 8 HOURS PRN
Status: DISCONTINUED | OUTPATIENT
Start: 2025-08-15 | End: 2025-08-16 | Stop reason: HOSPADM

## 2025-08-15 RX ORDER — METOPROLOL SUCCINATE 50 MG/1
50 TABLET, EXTENDED RELEASE ORAL DAILY
Status: DISCONTINUED | OUTPATIENT
Start: 2025-08-15 | End: 2025-08-16 | Stop reason: HOSPADM

## 2025-08-15 RX ORDER — ASPIRIN 81 MG/1
81 TABLET ORAL DAILY
Status: DISCONTINUED | OUTPATIENT
Start: 2025-08-16 | End: 2025-08-16 | Stop reason: HOSPADM

## 2025-08-15 RX ORDER — SODIUM CHLORIDE 0.9 % (FLUSH) 0.9 %
5-40 SYRINGE (ML) INJECTION EVERY 12 HOURS SCHEDULED
Status: DISCONTINUED | OUTPATIENT
Start: 2025-08-15 | End: 2025-08-16 | Stop reason: HOSPADM

## 2025-08-15 RX ORDER — SODIUM CHLORIDE 9 MG/ML
INJECTION, SOLUTION INTRAVENOUS PRN
Status: DISCONTINUED | OUTPATIENT
Start: 2025-08-15 | End: 2025-08-16 | Stop reason: HOSPADM

## 2025-08-15 RX ORDER — ALLOPURINOL 100 MG/1
100 TABLET ORAL DAILY
Status: DISCONTINUED | OUTPATIENT
Start: 2025-08-16 | End: 2025-08-16 | Stop reason: HOSPADM

## 2025-08-15 RX ORDER — SODIUM CHLORIDE 0.9 % (FLUSH) 0.9 %
10 SYRINGE (ML) INJECTION PRN
Status: DISCONTINUED | OUTPATIENT
Start: 2025-08-15 | End: 2025-08-16 | Stop reason: HOSPADM

## 2025-08-15 RX ORDER — ISOSORBIDE DINITRATE 10 MG/1
5 TABLET ORAL 3 TIMES DAILY
Status: ON HOLD | COMMUNITY
End: 2025-08-16 | Stop reason: HOSPADM

## 2025-08-15 RX ORDER — ATORVASTATIN CALCIUM 40 MG/1
40 TABLET, FILM COATED ORAL DAILY
Status: DISCONTINUED | OUTPATIENT
Start: 2025-08-15 | End: 2025-08-16 | Stop reason: HOSPADM

## 2025-08-15 RX ORDER — ONDANSETRON 2 MG/ML
4 INJECTION INTRAMUSCULAR; INTRAVENOUS EVERY 6 HOURS PRN
Status: DISCONTINUED | OUTPATIENT
Start: 2025-08-15 | End: 2025-08-16 | Stop reason: HOSPADM

## 2025-08-15 RX ORDER — POTASSIUM CHLORIDE 1500 MG/1
40 TABLET, EXTENDED RELEASE ORAL PRN
Status: DISCONTINUED | OUTPATIENT
Start: 2025-08-15 | End: 2025-08-16

## 2025-08-15 RX ORDER — ASPIRIN 81 MG/1
324 TABLET, CHEWABLE ORAL ONCE
Status: COMPLETED | OUTPATIENT
Start: 2025-08-15 | End: 2025-08-15

## 2025-08-15 RX ORDER — LANOLIN ALCOHOL/MO/W.PET/CERES
100 CREAM (GRAM) TOPICAL DAILY
Status: DISCONTINUED | OUTPATIENT
Start: 2025-08-15 | End: 2025-08-16 | Stop reason: HOSPADM

## 2025-08-15 RX ORDER — ISOSORBIDE DINITRATE 10 MG/1
5 TABLET ORAL 3 TIMES DAILY
Status: DISCONTINUED | OUTPATIENT
Start: 2025-08-15 | End: 2025-08-16

## 2025-08-15 RX ORDER — GEMFIBROZIL 600 MG/1
600 TABLET, FILM COATED ORAL
Status: DISCONTINUED | OUTPATIENT
Start: 2025-08-16 | End: 2025-08-16 | Stop reason: HOSPADM

## 2025-08-15 RX ORDER — ASCORBIC ACID 125 MG
500 TABLET,CHEWABLE ORAL NIGHTLY
COMMUNITY

## 2025-08-15 RX ORDER — ACETAMINOPHEN 325 MG/1
650 TABLET ORAL EVERY 6 HOURS PRN
Status: DISCONTINUED | OUTPATIENT
Start: 2025-08-15 | End: 2025-08-16 | Stop reason: HOSPADM

## 2025-08-15 RX ORDER — ENOXAPARIN SODIUM 100 MG/ML
40 INJECTION SUBCUTANEOUS DAILY
Status: DISCONTINUED | OUTPATIENT
Start: 2025-08-15 | End: 2025-08-16 | Stop reason: HOSPADM

## 2025-08-15 RX ADMIN — ATORVASTATIN CALCIUM 40 MG: 40 TABLET, FILM COATED ORAL at 20:24

## 2025-08-15 RX ADMIN — METOPROLOL SUCCINATE 50 MG: 50 TABLET, EXTENDED RELEASE ORAL at 20:24

## 2025-08-15 RX ADMIN — ISOSORBIDE DINITRATE 5 MG: 10 TABLET ORAL at 20:24

## 2025-08-15 RX ADMIN — SODIUM CHLORIDE, PRESERVATIVE FREE 10 ML: 5 INJECTION INTRAVENOUS at 20:24

## 2025-08-15 RX ADMIN — ENOXAPARIN SODIUM 40 MG: 100 INJECTION SUBCUTANEOUS at 20:24

## 2025-08-15 RX ADMIN — ASPIRIN 324 MG: 81 TABLET, CHEWABLE ORAL at 12:21

## 2025-08-15 RX ADMIN — Medication 100 MG: at 20:24

## 2025-08-15 ASSESSMENT — PAIN SCALES - GENERAL
PAINLEVEL_OUTOF10: 0
PAINLEVEL_OUTOF10: 0

## 2025-08-15 ASSESSMENT — LIFESTYLE VARIABLES
HOW OFTEN DO YOU HAVE A DRINK CONTAINING ALCOHOL: 4 OR MORE TIMES A WEEK
HOW MANY STANDARD DRINKS CONTAINING ALCOHOL DO YOU HAVE ON A TYPICAL DAY: 3 OR 4

## 2025-08-16 VITALS
TEMPERATURE: 96.7 F | RESPIRATION RATE: 19 BRPM | HEART RATE: 59 BPM | OXYGEN SATURATION: 96 % | SYSTOLIC BLOOD PRESSURE: 118 MMHG | DIASTOLIC BLOOD PRESSURE: 86 MMHG | HEIGHT: 64 IN | WEIGHT: 174.82 LBS | BODY MASS INDEX: 29.85 KG/M2

## 2025-08-16 PROBLEM — R07.89 CHEST HEAVINESS: Status: ACTIVE | Noted: 2020-02-22

## 2025-08-16 LAB
ANION GAP SERPL CALCULATED.3IONS-SCNC: 13 MMOL/L (ref 7–16)
BNP SERPL-MCNC: 127 PG/ML (ref 0–125)
BUN SERPL-MCNC: 14 MG/DL (ref 8–23)
CALCIUM SERPL-MCNC: 9.2 MG/DL (ref 8.8–10.2)
CHLORIDE SERPL-SCNC: 107 MMOL/L (ref 98–107)
CHOLEST SERPL-MCNC: 119 MG/DL
CO2 SERPL-SCNC: 20 MMOL/L (ref 22–29)
CREAT SERPL-MCNC: 0.9 MG/DL (ref 0.7–1.2)
ERYTHROCYTE [DISTWIDTH] IN BLOOD BY AUTOMATED COUNT: 12.7 % (ref 11.5–15)
GFR, ESTIMATED: >90 ML/MIN/1.73M2
GLUCOSE BLD-MCNC: 109 MG/DL (ref 74–99)
GLUCOSE SERPL-MCNC: 103 MG/DL (ref 74–99)
HBA1C MFR BLD: 6.1 % (ref 4–5.6)
HCT VFR BLD AUTO: 44.2 % (ref 37–54)
HDLC SERPL-MCNC: 46 MG/DL
HGB BLD-MCNC: 14.7 G/DL (ref 12.5–16.5)
LDLC SERPL CALC-MCNC: 8 MG/DL
MCH RBC QN AUTO: 29.6 PG (ref 26–35)
MCHC RBC AUTO-ENTMCNC: 33.3 G/DL (ref 32–34.5)
MCV RBC AUTO: 89.1 FL (ref 80–99.9)
PLATELET # BLD AUTO: 313 K/UL (ref 130–450)
PMV BLD AUTO: 10.2 FL (ref 7–12)
POTASSIUM SERPL-SCNC: 4 MMOL/L (ref 3.5–5.1)
RBC # BLD AUTO: 4.96 M/UL (ref 3.8–5.8)
SODIUM SERPL-SCNC: 140 MMOL/L (ref 136–145)
TRIGL SERPL-MCNC: 327 MG/DL
VLDLC SERPL CALC-MCNC: 65 MG/DL
WBC OTHER # BLD: 5.4 K/UL (ref 4.5–11.5)

## 2025-08-16 PROCEDURE — 83036 HEMOGLOBIN GLYCOSYLATED A1C: CPT

## 2025-08-16 PROCEDURE — APPSS60 APP SPLIT SHARED TIME 46-60 MINUTES

## 2025-08-16 PROCEDURE — 99223 1ST HOSP IP/OBS HIGH 75: CPT | Performed by: INTERNAL MEDICINE

## 2025-08-16 PROCEDURE — 36415 COLL VENOUS BLD VENIPUNCTURE: CPT

## 2025-08-16 PROCEDURE — 83880 ASSAY OF NATRIURETIC PEPTIDE: CPT

## 2025-08-16 PROCEDURE — 85027 COMPLETE CBC AUTOMATED: CPT

## 2025-08-16 PROCEDURE — 82962 GLUCOSE BLOOD TEST: CPT

## 2025-08-16 PROCEDURE — 80048 BASIC METABOLIC PNL TOTAL CA: CPT

## 2025-08-16 PROCEDURE — 80061 LIPID PANEL: CPT

## 2025-08-16 PROCEDURE — 6370000000 HC RX 637 (ALT 250 FOR IP): Performed by: NURSE PRACTITIONER

## 2025-08-16 PROCEDURE — G0378 HOSPITAL OBSERVATION PER HR: HCPCS

## 2025-08-16 PROCEDURE — 2500000003 HC RX 250 WO HCPCS: Performed by: NURSE PRACTITIONER

## 2025-08-16 RX ORDER — GLUCAGON 1 MG/ML
1 KIT INJECTION PRN
Status: DISCONTINUED | OUTPATIENT
Start: 2025-08-16 | End: 2025-08-16 | Stop reason: HOSPADM

## 2025-08-16 RX ORDER — ISOSORBIDE MONONITRATE 30 MG/1
30 TABLET, EXTENDED RELEASE ORAL DAILY
Status: DISCONTINUED | OUTPATIENT
Start: 2025-08-17 | End: 2025-08-16 | Stop reason: HOSPADM

## 2025-08-16 RX ORDER — INSULIN LISPRO 100 [IU]/ML
0-4 INJECTION, SOLUTION INTRAVENOUS; SUBCUTANEOUS
Status: DISCONTINUED | OUTPATIENT
Start: 2025-08-16 | End: 2025-08-16 | Stop reason: HOSPADM

## 2025-08-16 RX ORDER — ISOSORBIDE MONONITRATE 30 MG/1
30 TABLET, EXTENDED RELEASE ORAL DAILY
Qty: 30 TABLET | Refills: 3 | Status: SHIPPED | OUTPATIENT
Start: 2025-08-17

## 2025-08-16 RX ORDER — ISOSORBIDE MONONITRATE 30 MG/1
30 TABLET, EXTENDED RELEASE ORAL DAILY
Status: DISCONTINUED | OUTPATIENT
Start: 2025-08-16 | End: 2025-08-16

## 2025-08-16 RX ORDER — DEXTROSE MONOHYDRATE 100 MG/ML
INJECTION, SOLUTION INTRAVENOUS CONTINUOUS PRN
Status: DISCONTINUED | OUTPATIENT
Start: 2025-08-16 | End: 2025-08-16 | Stop reason: HOSPADM

## 2025-08-16 RX ADMIN — GEMFIBROZIL 600 MG: 600 TABLET ORAL at 05:15

## 2025-08-16 RX ADMIN — Medication 100 MG: at 09:34

## 2025-08-16 RX ADMIN — ASPIRIN 81 MG: 81 TABLET, COATED ORAL at 09:34

## 2025-08-16 RX ADMIN — ISOSORBIDE DINITRATE 5 MG: 10 TABLET ORAL at 12:32

## 2025-08-16 RX ADMIN — METOPROLOL SUCCINATE 50 MG: 50 TABLET, EXTENDED RELEASE ORAL at 09:34

## 2025-08-16 RX ADMIN — ALLOPURINOL 100 MG: 100 TABLET ORAL at 09:34

## 2025-08-16 RX ADMIN — ISOSORBIDE DINITRATE 5 MG: 10 TABLET ORAL at 09:33

## 2025-08-16 RX ADMIN — SODIUM CHLORIDE, PRESERVATIVE FREE 10 ML: 5 INJECTION INTRAVENOUS at 09:35

## 2025-08-16 ASSESSMENT — PAIN SCALES - GENERAL
PAINLEVEL_OUTOF10: 0
PAINLEVEL_OUTOF10: 0

## 2025-08-19 ENCOUNTER — TELEPHONE (OUTPATIENT)
Dept: CARDIOLOGY CLINIC | Age: 70
End: 2025-08-19

## (undated) DEVICE — STERILE LATEX POWDER FREE SURGICAL GLOVES WITH HYDROGEL COATING: Brand: PROTEXIS

## (undated) DEVICE — ELECTRODE PT RET AD L9FT HI MOIST COND ADH HYDRGEL CORDED

## (undated) DEVICE — GLOVE SURG SZ 65 THK91MIL LTX FREE SYN POLYISOPRENE

## (undated) DEVICE — PROBE CRYOABLATION L10CM ALUM SMOOTH MAL RETRCT HND FLX TB

## (undated) DEVICE — CANNULA INJ L2.5IN BLNT TIP 3MM CLR BODY W/ 1 W VLV DLP

## (undated) DEVICE — PICO 7 10CM X 30CM: Brand: PICO™ 7

## (undated) DEVICE — DRAIN CHN 19FR L0.25MM DIA6.3MM SIL RND HUBLESS FULL FLUT

## (undated) DEVICE — PACK OPEN HRT DRP

## (undated) DEVICE — BATTERY PACK FOR VARISPEED: Brand: STRYKER VARISPEED

## (undated) DEVICE — AGENT HEMSTAT W4XL8IN OXIDIZED REGENERATED CELOS ABSRB

## (undated) DEVICE — TOWEL,OR,DSP,ST,WHITE,DLX,4/PK,20PK/CS: Brand: MEDLINE

## (undated) DEVICE — ALCOHOL RUBBING ISO 16OZ 70%

## (undated) DEVICE — 6 FOOT DISPOSABLE EXTENSION CABLE WITH SAFE CONNECT / SCREW-DOWN

## (undated) DEVICE — MARKER A/C LOCATOR GRAFT U SILICONE

## (undated) DEVICE — BASIC SINGLE BASIN 1-LF: Brand: MEDLINE INDUSTRIES, INC.

## (undated) DEVICE — GLOVE SURG SZ 6 THK91MIL LTX FREE SYN POLYISOPRENE ANTI

## (undated) DEVICE — LIQUIBAND RAPID ADHESIVE 36/CS 0.8ML: Brand: MEDLINE

## (undated) DEVICE — GLOVE ORANGE PI 7   MSG9070

## (undated) DEVICE — CATHETER THOR 32FR L23IN PVC 5 EYELET STR ATRAUM

## (undated) DEVICE — BLOWER COR ART L16.5CM PLAS SHFT MAL W/ MIST IV SET AXIUS

## (undated) DEVICE — CHANNEL DRAIN, 28FR, HUBLESS: Brand: JACKSON-PRATT

## (undated) DEVICE — AORTIC PUNCHES ARE USED TO CREATE A UNIFORM OPENING IN BLOOD VESSELS DURING CARDIOVASCULAR SURGERY. THE VESSEL GRAFT IS INSERTED INTO THE CREATED OPENING AND SUTURED TO THE VESSEL WALL. AORTIC LANCETS ARE USED TO MAKE A SMALL UNIFORM CUT IN A BLOOD VESSEL TO FACILITATE INSERTION OF AN AORTIC PUNCH.  PUNCHES COME IN VARIOUS LENGTHS, DIAMETERS AND TIP CONFIGURATIONS.: Brand: CLEANCUT ROTATING AORTIC PUNCH

## (undated) DEVICE — TOWEL,OR,DSP,ST,BLUE,STD,6/PK,12PK/CS: Brand: MEDLINE

## (undated) DEVICE — GLOVE ORANGE PI 7 1/2   MSG9075

## (undated) DEVICE — CLAMP ABLAT JAW L65MM L CRV 2 ELECTRD BPLR

## (undated) DEVICE — Device: Brand: VIRTUOSAPH PLUS WITH RADIAL INDICATION

## (undated) DEVICE — CATHETER,URETHRAL,REDRUBBER,STRL,18FR: Brand: MEDLINE

## (undated) DEVICE — SOLUTION IV 50ML 0.9% SOD CHL PLAS CONT USP VIAFLX

## (undated) DEVICE — DRAIN SURG SGL COLL PT TB FOR ATS BG OASIS

## (undated) DEVICE — GOWN,SIRUS,FABRNF,L,20/CS: Brand: MEDLINE

## (undated) DEVICE — CATHETER THOR 32FR L23IN PVC 6 EYELET STR ATRAUM

## (undated) DEVICE — GOWN,SIRUS,FABRNF,XL,20/CS: Brand: MEDLINE

## (undated) DEVICE — PACK HEART OPEN

## (undated) DEVICE — GLOVE SURG SZ 7.5 L11.73IN FNGR THK9.8MIL STRW LTX POLYMER

## (undated) DEVICE — SOLUTION IV 1000ML 140MEQ/L SOD 5MEQ/L K 3MEQ/L MG 27MEQ/L

## (undated) DEVICE — INSUFFLATION TUBING SET WITH FILTER, FUNNEL CONNECTOR AND LUER LOCK: Brand: JOSNOE MEDICAL INC

## (undated) DEVICE — DOUBLE BASIN SET: Brand: MEDLINE INDUSTRIES, INC.

## (undated) DEVICE — TUBING, SUCTION, 3/16" X 12', STRAIGHT: Brand: MEDLINE

## (undated) DEVICE — DRAPE THER FLUID WARMING 66X44 IN FLAT SLUSH DBL DISC ORS